# Patient Record
Sex: FEMALE | Race: BLACK OR AFRICAN AMERICAN | NOT HISPANIC OR LATINO | Employment: FULL TIME | URBAN - METROPOLITAN AREA
[De-identification: names, ages, dates, MRNs, and addresses within clinical notes are randomized per-mention and may not be internally consistent; named-entity substitution may affect disease eponyms.]

---

## 2023-03-13 PROBLEM — N63.0 BREAST LUMP: Status: ACTIVE | Noted: 2023-03-13

## 2023-03-13 PROBLEM — E66.9 OBESITY, CLASS II, BMI 35-39.9: Status: ACTIVE | Noted: 2023-03-13

## 2023-03-13 PROBLEM — J30.2 SEASONAL ALLERGIES: Status: ACTIVE | Noted: 2023-03-13

## 2023-03-13 PROBLEM — L30.9 ECZEMA: Status: ACTIVE | Noted: 2023-03-13

## 2023-03-13 PROBLEM — E66.812 OBESITY, CLASS II, BMI 35-39.9: Status: ACTIVE | Noted: 2023-03-13

## 2023-03-13 PROBLEM — H52.203 ASTIGMATISM, BILATERAL: Status: ACTIVE | Noted: 2023-03-13

## 2023-03-13 PROBLEM — R87.612 LOW GRADE SQUAMOUS INTRAEPITHELIAL LESION (LGSIL) ON CERVICAL PAP SMEAR: Status: ACTIVE | Noted: 2023-03-13

## 2023-03-13 RX ORDER — IBUPROFEN 200 MG
1 TABLET ORAL
COMMUNITY
Start: 2020-06-22 | End: 2023-10-26

## 2023-03-13 RX ORDER — NICOTINE 7MG/24HR
1 PATCH, TRANSDERMAL 24 HOURS TRANSDERMAL DAILY PRN
COMMUNITY
Start: 2020-06-22 | End: 2023-10-26

## 2023-03-24 ENCOUNTER — APPOINTMENT (OUTPATIENT)
Dept: PRIMARY CARE | Facility: CLINIC | Age: 33
End: 2023-03-24
Payer: COMMERCIAL

## 2023-04-18 ENCOUNTER — APPOINTMENT (OUTPATIENT)
Dept: LAB | Facility: LAB | Age: 33
End: 2023-04-18
Payer: COMMERCIAL

## 2023-04-18 LAB
ABO GROUP (TYPE) IN BLOOD: NORMAL
ALANINE AMINOTRANSFERASE (SGPT) (U/L) IN SER/PLAS: 16 U/L (ref 7–45)
ALBUMIN (G/DL) IN SER/PLAS: 4.1 G/DL (ref 3.4–5)
ALKALINE PHOSPHATASE (U/L) IN SER/PLAS: 66 U/L (ref 33–110)
ANION GAP IN SER/PLAS: 8 MMOL/L (ref 10–20)
ANTIBODY SCREEN: NORMAL
ASPARTATE AMINOTRANSFERASE (SGOT) (U/L) IN SER/PLAS: 14 U/L (ref 9–39)
BILIRUBIN TOTAL (MG/DL) IN SER/PLAS: 0.4 MG/DL (ref 0–1.2)
CALCIUM (MG/DL) IN SER/PLAS: 9.6 MG/DL (ref 8.6–10.6)
CARBON DIOXIDE, TOTAL (MMOL/L) IN SER/PLAS: 26 MMOL/L (ref 21–32)
CHLORIDE (MMOL/L) IN SER/PLAS: 104 MMOL/L (ref 98–107)
CREATININE (MG/DL) IN SER/PLAS: 0.64 MG/DL (ref 0.5–1.05)
CREATININE (MG/DL) IN URINE: 98.2 MG/DL (ref 20–320)
ERYTHROCYTE DISTRIBUTION WIDTH (RATIO) BY AUTOMATED COUNT: 12.5 % (ref 11.5–14.5)
ERYTHROCYTE MEAN CORPUSCULAR HEMOGLOBIN CONCENTRATION (G/DL) BY AUTOMATED: 33.2 G/DL (ref 32–36)
ERYTHROCYTE MEAN CORPUSCULAR VOLUME (FL) BY AUTOMATED COUNT: 85 FL (ref 80–100)
ERYTHROCYTES (10*6/UL) IN BLOOD BY AUTOMATED COUNT: 4.49 X10E12/L (ref 4–5.2)
ESTIMATED AVERAGE GLUCOSE FOR HBA1C: 111 MG/DL
GFR FEMALE: >90 ML/MIN/1.73M2
GLUCOSE (MG/DL) IN SER/PLAS: 87 MG/DL (ref 74–99)
HEMATOCRIT (%) IN BLOOD BY AUTOMATED COUNT: 38.3 % (ref 36–46)
HEMOGLOBIN (G/DL) IN BLOOD: 12.7 G/DL (ref 12–16)
HEMOGLOBIN A1C/HEMOGLOBIN TOTAL IN BLOOD: 5.5 %
HEPATITIS B VIRUS SURFACE AG PRESENCE IN SERUM: NONREACTIVE
HEPATITIS C VIRUS AB PRESENCE IN SERUM: NONREACTIVE
HIV 1/ 2 AG/AB SCREEN: NONREACTIVE
LACTATE DEHYDROGENASE (U/L) IN SER/PLAS BY LAC->PYR RXN: 116 U/L (ref 84–246)
LEUKOCYTES (10*3/UL) IN BLOOD BY AUTOMATED COUNT: 10.9 X10E9/L (ref 4.4–11.3)
NRBC (PER 100 WBCS) BY AUTOMATED COUNT: 0 /100 WBC (ref 0–0)
PLATELETS (10*3/UL) IN BLOOD AUTOMATED COUNT: 184 X10E9/L (ref 150–450)
POTASSIUM (MMOL/L) IN SER/PLAS: 4 MMOL/L (ref 3.5–5.3)
PROTEIN (MG/DL) IN URINE: 9 MG/DL (ref 5–24)
PROTEIN TOTAL: 6.9 G/DL (ref 6.4–8.2)
PROTEIN/CREATININE (MG/MG) IN URINE: 0.09 MG/MG CREAT (ref 0–0.17)
REFLEX ADDED, ANEMIA PANEL: NORMAL
RH FACTOR: NORMAL
RUBELLA VIRUS IGG AB: POSITIVE
SODIUM (MMOL/L) IN SER/PLAS: 134 MMOL/L (ref 136–145)
SYPHILIS TOTAL AB: NONREACTIVE
URATE (MG/DL) IN SER/PLAS: 3.3 MG/DL (ref 2.3–6.7)
UREA NITROGEN (MG/DL) IN SER/PLAS: 9 MG/DL (ref 6–23)

## 2023-04-19 LAB
CHLAMYDIA TRACH., AMPLIFIED: NEGATIVE
N. GONORRHEA, AMPLIFIED: NEGATIVE
TRICHOMONAS VAGINALIS: NEGATIVE
URINE CULTURE: NO GROWTH

## 2023-04-20 LAB
HEMOGLOBIN A2: 2.7 %
HEMOGLOBIN A: 97 %
HEMOGLOBIN F: 0.3 %
HEMOGLOBIN IDENTIFICATION INTERPRETATION: NORMAL
PATH REVIEW-HGB IDENTIFICATION: NORMAL

## 2023-05-16 ENCOUNTER — APPOINTMENT (OUTPATIENT)
Dept: LAB | Facility: LAB | Age: 33
End: 2023-05-16
Payer: COMMERCIAL

## 2023-08-01 ASSESSMENT — EDINBURGH POSTNATAL DEPRESSION SCALE (EPDS)
I HAVE LOOKED FORWARD WITH ENJOYMENT TO THINGS: AS MUCH AS I EVER DID
I HAVE BEEN ANXIOUS OR WORRIED FOR NO GOOD REASON: NO, NOT AT ALL
I HAVE BEEN SO UNHAPPY THAT I HAVE BEEN CRYING: NO, NEVER
THINGS HAVE BEEN GETTING ON TOP OF ME: NO, I HAVE BEEN COPING AS WELL AS EVER
TOTAL SCORE: 0
THE THOUGHT OF HARMING MYSELF HAS OCCURRED TO ME: NEVER
I HAVE BEEN ABLE TO LAUGH AND SEE THE FUNNY SIDE OF THINGS: AS MUCH AS I ALWAYS COULD
I HAVE BEEN SO UNHAPPY THAT I HAVE HAD DIFFICULTY SLEEPING: NOT AT ALL
I HAVE FELT SCARED OR PANICKY FOR NO GOOD REASON: NO, NOT AT ALL
I HAVE FELT SAD OR MISERABLE: NO, NOT AT ALL
I HAVE BLAMED MYSELF UNNECESSARILY WHEN THINGS WENT WRONG: NO, NEVER

## 2023-09-07 LAB
ERYTHROCYTE DISTRIBUTION WIDTH (RATIO) BY AUTOMATED COUNT: 13.2 % (ref 11.5–14.5)
ERYTHROCYTE MEAN CORPUSCULAR HEMOGLOBIN CONCENTRATION (G/DL) BY AUTOMATED: 32.4 G/DL (ref 32–36)
ERYTHROCYTE MEAN CORPUSCULAR VOLUME (FL) BY AUTOMATED COUNT: 84 FL (ref 80–100)
ERYTHROCYTES (10*6/UL) IN BLOOD BY AUTOMATED COUNT: 3.98 X10E12/L (ref 4–5.2)
FERRITIN, PREGNANCY: 42 UG/L
FOLATE, SERUM, PREGNANCY: 14.3 NG/ML
GLUCOSE, 1 HR SCREEN, PREG: 148 MG/DL
HEMATOCRIT (%) IN BLOOD BY AUTOMATED COUNT: 33.6 % (ref 36–46)
HEMOGLOBIN (G/DL) IN BLOOD: 10.9 G/DL (ref 12–16)
IRON (UG/DL) IN SER/PLAS IN PREGNANCY: 26 UG/DL
IRON BINDING CAPACITY (UG/DL) IN PREGNANCY: 440 UG/DL
IRON SATURATION (%) IN PREGNANCY: 6 %
LEUKOCYTES (10*3/UL) IN BLOOD BY AUTOMATED COUNT: 11.2 X10E9/L (ref 4.4–11.3)
NRBC (PER 100 WBCS) BY AUTOMATED COUNT: 0 /100 WBC (ref 0–0)
PLATELETS (10*3/UL) IN BLOOD AUTOMATED COUNT: 177 X10E9/L (ref 150–450)
REFLEX ADDED, ANEMIA PANEL: ABNORMAL
SYPHILIS TOTAL AB: NONREACTIVE
VITAMIN B12, PREGNANCY: 379 PG/ML

## 2023-09-28 LAB
GLUCOSE THREE HOUR: 84 MG/DL
GLUCOSE TWO HOUR: 163 MG/DL
GLUCOSE, FASTING: 80 MG/DL
GLUCOSE, ONE HOUR: 173 MG/DL
GTTCM: ABNORMAL

## 2023-09-30 PROBLEM — R87.610 ASCUS WITH POSITIVE HIGH RISK HPV CERVICAL: Status: ACTIVE | Noted: 2023-09-30

## 2023-09-30 PROBLEM — R87.810 ASCUS WITH POSITIVE HIGH RISK HPV CERVICAL: Status: ACTIVE | Noted: 2023-09-30

## 2023-10-01 ENCOUNTER — PHARMACY VISIT (OUTPATIENT)
Dept: PHARMACY | Facility: CLINIC | Age: 33
End: 2023-10-01
Payer: MEDICAID

## 2023-10-01 PROCEDURE — RXMED WILLOW AMBULATORY MEDICATION CHARGE

## 2023-10-02 ENCOUNTER — PROCEDURE VISIT (OUTPATIENT)
Dept: OBSTETRICS AND GYNECOLOGY | Facility: HOSPITAL | Age: 33
End: 2023-10-02
Payer: COMMERCIAL

## 2023-10-02 VITALS — SYSTOLIC BLOOD PRESSURE: 121 MMHG | BODY MASS INDEX: 37.77 KG/M2 | WEIGHT: 234 LBS | DIASTOLIC BLOOD PRESSURE: 77 MMHG

## 2023-10-02 DIAGNOSIS — R87.810 ASCUS WITH POSITIVE HIGH RISK HPV CERVICAL: Primary | ICD-10-CM

## 2023-10-02 DIAGNOSIS — R87.610 ASCUS WITH POSITIVE HIGH RISK HPV CERVICAL: Primary | ICD-10-CM

## 2023-10-02 PROCEDURE — 57452 EXAM OF CERVIX W/SCOPE: CPT | Performed by: OBSTETRICS & GYNECOLOGY

## 2023-10-02 NOTE — PROGRESS NOTES
Patient ID: Rita Mckeon is a 32 y.o. female.    Colposcopy    Date/Time: 10/2/2023 12:56 PM    Performed by: Savanna Rowley MD  Authorized by: Savanna Rowley MD    Procedure location: cervix    Consent:     Patient questions answered: yes      Risks and benefits of the procedure and its alternatives discussed: yes      Procedural risks discussed:  Bleeding and infection    Consent obtained:  Written    Consent given by:  Patient  Universal Protocol:     A time out verifies correct patient, procedure, equipment, support staff, and site/side marked as required: yes      Patient states understanding of procedure being performed: yes    Indication:     Cervical indication(s): high-risk HPV positive and ASCUS    Pre-procedure:     Speculum was placed in the vagina: yes    Procedure:     Colposcopy with: colposcopy only      Biopsy taken: no      Cervix visibility: fully visualized      SCJ visibility: fully visualized      Lesion visualized: not fully visualized      Acetowhite lesion(s): cervix      Acetowhite lesion(s) description:  AW at 12, 3    Cervical impression: low grade    Post-procedure:     Patient tolerance of procedure:  Patient tolerated the procedure well with no immediate complications    Instructions and paperwork completed: yes    Comments:      Procedure details:  The patient is . Rita is 32 weeks pregnant. The patient has not had a hysterectomy. The patient is not using contraception. The patient does not use tobacco. HIV negative.  A Pap smear was not performed.    Impression:  Low grade: thin/translucent acetowhite, rapidly fading acetowhite, and fine punctuation    Plan for Follow-up:    Follow-up Pap postpartum        Physical Exam  Genitourinary:           Comments: AW white changes

## 2023-10-12 ENCOUNTER — ROUTINE PRENATAL (OUTPATIENT)
Dept: OBSTETRICS AND GYNECOLOGY | Facility: CLINIC | Age: 33
End: 2023-10-12
Payer: COMMERCIAL

## 2023-10-12 VITALS — SYSTOLIC BLOOD PRESSURE: 113 MMHG | BODY MASS INDEX: 38.58 KG/M2 | WEIGHT: 239 LBS | DIASTOLIC BLOOD PRESSURE: 72 MMHG

## 2023-10-12 DIAGNOSIS — Z34.83 PRENATAL CARE, SUBSEQUENT PREGNANCY IN THIRD TRIMESTER (HHS-HCC): ICD-10-CM

## 2023-10-12 DIAGNOSIS — Z3A.34 34 WEEKS GESTATION OF PREGNANCY (HHS-HCC): ICD-10-CM

## 2023-10-12 DIAGNOSIS — O10.919 CHRONIC HYPERTENSION AFFECTING PREGNANCY (HHS-HCC): Primary | ICD-10-CM

## 2023-10-12 DIAGNOSIS — E66.9 OBESITY, CLASS II, BMI 35-39.9: ICD-10-CM

## 2023-10-12 DIAGNOSIS — O99.810 ABNORMAL GLUCOSE TOLERANCE IN PREGNANCY (HHS-HCC): ICD-10-CM

## 2023-10-12 PROBLEM — J30.2 SEASONAL ALLERGIES: Status: RESOLVED | Noted: 2023-03-13 | Resolved: 2023-10-12

## 2023-10-12 PROBLEM — R87.810 ASCUS WITH POSITIVE HIGH RISK HPV CERVICAL: Status: ACTIVE | Noted: 2023-03-13

## 2023-10-12 PROBLEM — R87.610 ASCUS WITH POSITIVE HIGH RISK HPV CERVICAL: Status: ACTIVE | Noted: 2023-03-13

## 2023-10-12 PROCEDURE — 99214 OFFICE O/P EST MOD 30 MIN: CPT | Performed by: ADVANCED PRACTICE MIDWIFE

## 2023-10-12 NOTE — PROGRESS NOTES
Assessment/Plan   Problem List Items Addressed This Visit             ICD-10-CM    Obesity, Class II, BMI 35-39.9 E66.9    Abnormal glucose tolerance in pregnancy O99.810    Chronic hypertension affecting pregnancy - Primary O10.919    Prenatal care, subsequent pregnancy in third trimester Z34.83    34 weeks gestation of pregnancy Z3A.34     Declines flu shot   Follow up growth US scheduled   Discussed routine GBS screening, to be completed next visit  Reviewed s/sx of PTL, warning signs, fetal movement counts, and when to call provider  Follow up in 2 weeks for a routine prenatal visit.    MEE Cheek, APRN-CNP    Subjective     Rita Mckeon is a 32 y.o.  at 34w6d with a working estimated date of delivery of 2023, by Last Menstrual Period who presents for a routine prenatal visit. She denies vaginal bleeding, leakage of fluid, decreased fetal movements, or contractions.    Her pregnancy is complicated by:  Pregnancy Problems (from 23 to present)       Problem Noted Resolved    Abnormal glucose tolerance in pregnancy 10/12/2023 by MEE Cheek, APRN-CNP No    Priority:  Medium      Overview Signed 10/12/2023  8:58 PM by MEE Cheek APRN-CNP     Normal 3 hr gtt         Chronic hypertension affecting pregnancy 10/12/2023 by MEE Cheek, APRN-CNP No    Priority:  Medium      Overview Signed 10/12/2023  8:59 PM by MEE Cheek, APRN-CNP     No meds         Prenatal care, subsequent pregnancy in third trimester 10/12/2023 by MEE Cheek, APRN-CNP No    Priority:  Medium      34 weeks gestation of pregnancy 10/12/2023 by MEE Cheek, APRN-CNP No    Priority:  Medium               Objective   Physical Exam:   Weight: 108 kg (239 lb)  Pregravid BMI: 35.53  BP: 113/72  Fetal Heart Rate: 136 Fundal Height (cm): 39 cm Presentation: Vertex             Prenatal Labs  Lab Results   Component Value Date    HGB 10.9 (L) 2023     HCT 33.6 (L) 09/07/2023    HEPBSAG NONREACTIVE 04/18/2023

## 2023-10-14 PROBLEM — H52.203 ASTIGMATISM, BILATERAL: Status: RESOLVED | Noted: 2023-03-13 | Resolved: 2023-10-14

## 2023-10-14 PROBLEM — Z3A.34 34 WEEKS GESTATION OF PREGNANCY (HHS-HCC): Status: RESOLVED | Noted: 2023-10-12 | Resolved: 2023-10-14

## 2023-10-20 ENCOUNTER — APPOINTMENT (OUTPATIENT)
Dept: OBSTETRICS AND GYNECOLOGY | Facility: HOSPITAL | Age: 33
End: 2023-10-20
Payer: COMMERCIAL

## 2023-10-23 ENCOUNTER — HOSPITAL ENCOUNTER (OUTPATIENT)
Dept: RADIOLOGY | Facility: HOSPITAL | Age: 33
Discharge: HOME | End: 2023-10-23
Payer: COMMERCIAL

## 2023-10-23 DIAGNOSIS — O16.9 HYPERTENSION AFFECTING PREGNANCY (HHS-HCC): ICD-10-CM

## 2023-10-23 PROCEDURE — 76816 OB US FOLLOW-UP PER FETUS: CPT

## 2023-10-23 PROCEDURE — 76819 FETAL BIOPHYS PROFIL W/O NST: CPT | Performed by: OBSTETRICS & GYNECOLOGY

## 2023-10-23 PROCEDURE — 76816 OB US FOLLOW-UP PER FETUS: CPT | Performed by: OBSTETRICS & GYNECOLOGY

## 2023-10-23 PROCEDURE — 76819 FETAL BIOPHYS PROFIL W/O NST: CPT

## 2023-10-26 ENCOUNTER — ROUTINE PRENATAL (OUTPATIENT)
Dept: OBSTETRICS AND GYNECOLOGY | Facility: CLINIC | Age: 33
End: 2023-10-26
Payer: COMMERCIAL

## 2023-10-26 ENCOUNTER — PHARMACY VISIT (OUTPATIENT)
Dept: PHARMACY | Facility: CLINIC | Age: 33
End: 2023-10-26
Payer: MEDICAID

## 2023-10-26 VITALS — WEIGHT: 242.2 LBS | SYSTOLIC BLOOD PRESSURE: 110 MMHG | BODY MASS INDEX: 39.09 KG/M2 | DIASTOLIC BLOOD PRESSURE: 76 MMHG

## 2023-10-26 DIAGNOSIS — E66.9 OBESITY, CLASS II, BMI 35-39.9: Primary | ICD-10-CM

## 2023-10-26 DIAGNOSIS — O10.919 CHRONIC HYPERTENSION AFFECTING PREGNANCY (HHS-HCC): ICD-10-CM

## 2023-10-26 DIAGNOSIS — Z3A.36 36 WEEKS GESTATION OF PREGNANCY (HHS-HCC): ICD-10-CM

## 2023-10-26 DIAGNOSIS — Z34.83 PRENATAL CARE, SUBSEQUENT PREGNANCY IN THIRD TRIMESTER (HHS-HCC): ICD-10-CM

## 2023-10-26 PROCEDURE — 87081 CULTURE SCREEN ONLY: CPT | Performed by: ADVANCED PRACTICE MIDWIFE

## 2023-10-26 PROCEDURE — 99214 OFFICE O/P EST MOD 30 MIN: CPT | Mod: TH | Performed by: ADVANCED PRACTICE MIDWIFE

## 2023-10-26 PROCEDURE — 99214 OFFICE O/P EST MOD 30 MIN: CPT | Performed by: ADVANCED PRACTICE MIDWIFE

## 2023-10-26 NOTE — PROGRESS NOTES
Assessment/Plan   Diagnoses and all orders for this visit:  Obesity, Class II, BMI 35-39.9  -     Fetal nonstress test; Future  Prenatal care, subsequent pregnancy in third trimester  36 weeks gestation of pregnancy  -     Group B Streptococcus (GBS) Prenatal Screen, Culture; Future  Chronic hypertension affecting pregnancy    Discussed routine GBS screening, to be completed today  Discussed weekly NSTs 37 weeks - delivery   Normal growth US 10/23  Reviewed s/sx of PTL, warning signs, fetal movement counts, and when to call provider  Follow up in 1 week for a routine prenatal visit.    MEE Cheek, APRN-CNP    Paulette Mckeon is a 32 y.o.  at 36w6d with a working estimated date of delivery of 2023, by Last Menstrual Period who presents for a routine prenatal visit. She denies vaginal bleeding, leakage of fluid, decreased fetal movements, or contractions.    Her pregnancy is complicated by:  Pregnancy Problems (from 23 to present)       Problem Noted Resolved    36 weeks gestation of pregnancy 10/26/2023 by MEE Cheek, APRN-CNP No    Priority:  Medium      Abnormal glucose tolerance in pregnancy 10/12/2023 by MEE Cheek, APRN-CNP No    Priority:  Medium      Overview Addendum 10/14/2023  2:49 PM by MEE Becerra       Normal 3 hr gtt         Chronic hypertension affecting pregnancy 10/12/2023 by MEE Cheek, APRN-CNP No    Priority:  Medium      Overview Addendum 10/14/2023  2:47 PM by MEE Becerra       No meds         Prenatal care, subsequent pregnancy in third trimester 10/12/2023 by MEE Cheek, APRN-CNP No    Priority:  Medium      34 weeks gestation of pregnancy 10/12/2023 by MEE Cheek APRN-CNP 10/14/2023 by MEE Becerra    Priority:  Medium               Objective   Physical Exam:   Weight: 110 kg (242 lb 3.2 oz)  Pregravid BMI: 35.53  BP:  110/76 (HR 90)  Fetal Heart Rate: 138 Fundal Height (cm): 40 cm Presentation: Vertex             Prenatal Labs  Lab Results   Component Value Date    HGB 10.9 (L) 09/07/2023    HCT 33.6 (L) 09/07/2023    ABO O 04/18/2023    HEPBSAG NONREACTIVE 04/18/2023

## 2023-10-28 LAB — GP B STREP GENITAL QL CULT: NORMAL

## 2023-11-02 ENCOUNTER — CLINICAL SUPPORT (OUTPATIENT)
Dept: OBSTETRICS AND GYNECOLOGY | Facility: CLINIC | Age: 33
End: 2023-11-02
Payer: COMMERCIAL

## 2023-11-02 ENCOUNTER — TELEPHONE (OUTPATIENT)
Dept: OBSTETRICS AND GYNECOLOGY | Facility: CLINIC | Age: 33
End: 2023-11-02

## 2023-11-02 ENCOUNTER — ROUTINE PRENATAL (OUTPATIENT)
Dept: OBSTETRICS AND GYNECOLOGY | Facility: CLINIC | Age: 33
End: 2023-11-02
Payer: COMMERCIAL

## 2023-11-02 VITALS — BODY MASS INDEX: 39.22 KG/M2 | DIASTOLIC BLOOD PRESSURE: 75 MMHG | SYSTOLIC BLOOD PRESSURE: 119 MMHG | WEIGHT: 243 LBS

## 2023-11-02 DIAGNOSIS — O99.810 ABNORMAL GLUCOSE TOLERANCE IN PREGNANCY (HHS-HCC): ICD-10-CM

## 2023-11-02 DIAGNOSIS — Z34.83 ENCOUNTER FOR SUPERVISION OF OTHER NORMAL PREGNANCY IN THIRD TRIMESTER (HHS-HCC): Primary | ICD-10-CM

## 2023-11-02 DIAGNOSIS — Z3A.36 36 WEEKS GESTATION OF PREGNANCY (HHS-HCC): ICD-10-CM

## 2023-11-02 DIAGNOSIS — Z34.83 PRENATAL CARE, SUBSEQUENT PREGNANCY IN THIRD TRIMESTER (HHS-HCC): Primary | ICD-10-CM

## 2023-11-02 DIAGNOSIS — E66.9 OBESITY, CLASS II, BMI 35-39.9: ICD-10-CM

## 2023-11-02 DIAGNOSIS — O10.919 CHRONIC HYPERTENSION AFFECTING PREGNANCY (HHS-HCC): ICD-10-CM

## 2023-11-02 DIAGNOSIS — Z3A.37 37 WEEKS GESTATION OF PREGNANCY (HHS-HCC): ICD-10-CM

## 2023-11-02 DIAGNOSIS — Z34.83 PRENATAL CARE, SUBSEQUENT PREGNANCY IN THIRD TRIMESTER (HHS-HCC): ICD-10-CM

## 2023-11-02 PROCEDURE — 59025 FETAL NON-STRESS TEST: CPT | Performed by: ADVANCED PRACTICE MIDWIFE

## 2023-11-02 PROCEDURE — 99214 OFFICE O/P EST MOD 30 MIN: CPT | Mod: TH | Performed by: ADVANCED PRACTICE MIDWIFE

## 2023-11-02 PROCEDURE — 99214 OFFICE O/P EST MOD 30 MIN: CPT | Performed by: ADVANCED PRACTICE MIDWIFE

## 2023-11-02 NOTE — PROCEDURES
Rita Mckeon, ir[place]  at 37w6d with an MARI of 2023, by Last Menstrual Period, was seen at [facility] for a nonstress test.    Non-Stress Test   Baseline Fetal Heart Rate for Non-Stress Test: 130 BPM  Variability in Waveform for Non-Stress Test: Moderate  Accelerations in Non-Stress Test: Yes, lasting at least 15 seconds, greater than/equal to 15 bpm  Decelerations in Non-Stress Test: None  Contractions in Non-Stress Test: Not present  Acoustic Stimulator for Non-Stress Test: No  Interpretation of Non-Stress Test   Interpretation of Non-Stress Test: Reactive            Julisa Cui, APRN-CNM, APRN-CNP

## 2023-11-02 NOTE — PROGRESS NOTES
Subjective     Rita Mckeon is a 32 y.o.  at 37w6d with a working estimated date of delivery of 2023, by Last Menstrual Period who presents for a routine prenatal visit.     She denies vaginal bleeding, leakage of fluid, decreased fetal movements, or contractions.    Desires to schedule IOL @ 39 weeks. RN tasked to schedule.   Patient now leaning towards tubal ligation. Consent signed. Discussed that since she is < 30 days from MARI will have to do after 6 wk PP visit.     Objective   Physical Exam:    Expected Total Weight Gain: 5 kg (11 lb)-9 kg (19 lb)   Pregravid BMI: 35.53   Fetal Heart Rate: 130 Fundal Height (cm): 41 cm Presentation: Vertex           Postpartum Depression: Low Risk  (2023)    [place]  Depression Scale     Last EPDS Total Score: 0     Last EPDS Self Harm Result: Never        Problem List Items Addressed This Visit       Obesity, Class II, BMI 35-39.9    Overview       BMI 36 @ NOB visit  Growth US at 30 and 36 weeks   BPP or NST 37 wks to delivery          Abnormal glucose tolerance in pregnancy    Overview       Normal 3 hr gtt         Chronic hypertension affecting pregnancy    Overview       No meds         Prenatal care, subsequent pregnancy in third trimester - Primary    37 weeks gestation of pregnancy       Postpartum contraception options discussed, encouraged LARCs or bridge method until BTL. Patient to consider options.   Discussed expectations and methods used for IOL - RN to schedule   Reviewed neg GBS  Continue weekly  testing - BPP next week  Reviewed s/sx of labor, warning signs, fetal movement counts, and when to call provider  Follow up in 1 week for a routine prenatal visit.      Julisa Cui, NETTIE-CNM, APRN-CNP

## 2023-11-02 NOTE — TELEPHONE ENCOUNTER
IOL scheduled 11/11/23 at 2:00 pm 39w1d per DERIC Cui CNM. Pt notified. Visitor policy reviewed. Pt agrees No questions.

## 2023-11-09 ENCOUNTER — APPOINTMENT (OUTPATIENT)
Dept: OBSTETRICS AND GYNECOLOGY | Facility: CLINIC | Age: 33
End: 2023-11-09
Payer: COMMERCIAL

## 2023-11-09 ENCOUNTER — APPOINTMENT (OUTPATIENT)
Dept: RADIOLOGY | Facility: CLINIC | Age: 33
End: 2023-11-09
Payer: COMMERCIAL

## 2023-11-09 ENCOUNTER — ANCILLARY PROCEDURE (OUTPATIENT)
Dept: RADIOLOGY | Facility: CLINIC | Age: 33
End: 2023-11-09
Payer: COMMERCIAL

## 2023-11-09 ENCOUNTER — ROUTINE PRENATAL (OUTPATIENT)
Dept: OBSTETRICS AND GYNECOLOGY | Facility: CLINIC | Age: 33
End: 2023-11-09
Payer: COMMERCIAL

## 2023-11-09 VITALS — DIASTOLIC BLOOD PRESSURE: 84 MMHG | WEIGHT: 244.3 LBS | BODY MASS INDEX: 39.43 KG/M2 | SYSTOLIC BLOOD PRESSURE: 130 MMHG

## 2023-11-09 DIAGNOSIS — O10.919 CHRONIC HYPERTENSION AFFECTING PREGNANCY (HHS-HCC): ICD-10-CM

## 2023-11-09 DIAGNOSIS — O99.810 ABNORMAL GLUCOSE TOLERANCE IN PREGNANCY (HHS-HCC): ICD-10-CM

## 2023-11-09 DIAGNOSIS — Z3A.38 38 WEEKS GESTATION OF PREGNANCY (HHS-HCC): ICD-10-CM

## 2023-11-09 DIAGNOSIS — E66.9 OBESITY, CLASS II, BMI 35-39.9: Primary | ICD-10-CM

## 2023-11-09 DIAGNOSIS — Z34.83 PRENATAL CARE, SUBSEQUENT PREGNANCY IN THIRD TRIMESTER (HHS-HCC): ICD-10-CM

## 2023-11-09 DIAGNOSIS — O28.8 NST (NON-STRESS TEST) NONREACTIVE: ICD-10-CM

## 2023-11-09 PROCEDURE — 99214 OFFICE O/P EST MOD 30 MIN: CPT | Mod: TH | Performed by: ADVANCED PRACTICE MIDWIFE

## 2023-11-09 PROCEDURE — 99214 OFFICE O/P EST MOD 30 MIN: CPT | Performed by: ADVANCED PRACTICE MIDWIFE

## 2023-11-09 PROCEDURE — 76819 FETAL BIOPHYS PROFIL W/O NST: CPT

## 2023-11-09 NOTE — PROGRESS NOTES
Subjective     Rita Mckeon is a 32 y.o.  at 38w6d with a working estimated date of delivery of 2023, by Last Menstrual Period who presents for a routine prenatal visit.     She denies vaginal bleeding, leakage of fluid, decreased fetal movements, or contractions.    Objective   Physical Exam:   Weight: 111 kg (244 lb 4.8 oz)  Expected Total Weight Gain: 5 kg (11 lb)-9 kg (19 lb)   Pregravid BMI: 35.53  BP: 130/84 (HR 80)  Fetal Heart Rate: 140 Fundal Height (cm): 42 cm Presentation: Vertex           Postpartum Depression: Low Risk  (2023)    [place]  Depression Scale     Last EPDS Total Score: 0     Last EPDS Self Harm Result: Never        Problem List Items Addressed This Visit       Abnormal glucose tolerance in pregnancy    Overview       Normal 3 hr gtt         Chronic hypertension affecting pregnancy    Overview       No meds         Prenatal care, subsequent pregnancy in third trimester    37 weeks gestation of pregnancy     Ultrasound today    surveillance weekly for obesity class II  Discussed expectations and methods used for IOL  IOL scheduled   Reviewed etiology of shoulder dystocia  Occurrence in approximately 0.2-3% of vaginal deliveries  Discussed it is difficult to predict who may have a shoulder dystocia, however risk factors include:  diabetes in pregnancy (babies are more likely to have macrosomia; show a pattern of greater shoulder/chest/abdominal growth)  fetal macrosomia (3-13% of newborns weighing >4000g)  excessive weight gain in pregnancy (associated with increased rates of fetal macrosomia)  prior history of shoulder dystocia (10-12% recurrence rate)  Discussed maternal and  risks reviewed including: lacerations, PS separation,  brachial plexus injury, fractures to humerus or clavicle, HIE, rarely  death (1 in 25,000 deliveries)  Reviewed weight gain ; 1hr glucose (148) and WNL 23 hr gtt  Discussed  options/risks/benefits of pLTCS vs. vaginal delivery  Patient acknowledges understanding   Reviewed s/sx of labor, warning signs, fetal movement counts, and when to call provider  Follow up in 2 days for IOL or prn      Julisa Cui, APRN-CNM, APRN-CNP

## 2023-11-11 ENCOUNTER — APPOINTMENT (OUTPATIENT)
Dept: OBSTETRICS AND GYNECOLOGY | Facility: HOSPITAL | Age: 33
End: 2023-11-11
Payer: COMMERCIAL

## 2023-11-11 ENCOUNTER — HOSPITAL ENCOUNTER (INPATIENT)
Facility: HOSPITAL | Age: 33
LOS: 4 days | Discharge: HOME | End: 2023-11-15
Attending: STUDENT IN AN ORGANIZED HEALTH CARE EDUCATION/TRAINING PROGRAM | Admitting: STUDENT IN AN ORGANIZED HEALTH CARE EDUCATION/TRAINING PROGRAM
Payer: COMMERCIAL

## 2023-11-11 DIAGNOSIS — E66.9 OBESITY, CLASS II, BMI 35-39.9: Primary | ICD-10-CM

## 2023-11-11 DIAGNOSIS — R87.810 ASCUS WITH POSITIVE HIGH RISK HPV CERVICAL: ICD-10-CM

## 2023-11-11 DIAGNOSIS — O99.810 ABNORMAL GLUCOSE TOLERANCE IN PREGNANCY (HHS-HCC): ICD-10-CM

## 2023-11-11 DIAGNOSIS — O10.919 CHRONIC HYPERTENSION AFFECTING PREGNANCY (HHS-HCC): ICD-10-CM

## 2023-11-11 DIAGNOSIS — R87.610 ASCUS WITH POSITIVE HIGH RISK HPV CERVICAL: ICD-10-CM

## 2023-11-11 DIAGNOSIS — E66.9 OBESITY, CLASS II, BMI 35-39.9: ICD-10-CM

## 2023-11-11 DIAGNOSIS — Z34.83 ENCOUNTER FOR SUPERVISION OF OTHER NORMAL PREGNANCY IN THIRD TRIMESTER (HHS-HCC): ICD-10-CM

## 2023-11-11 PROBLEM — Z3A.39 39 WEEKS GESTATION OF PREGNANCY (HHS-HCC): Status: ACTIVE | Noted: 2023-11-11

## 2023-11-11 PROBLEM — Z3A.37 37 WEEKS GESTATION OF PREGNANCY (HHS-HCC): Status: RESOLVED | Noted: 2023-10-26 | Resolved: 2023-11-11

## 2023-11-11 LAB
ABO GROUP (TYPE) IN BLOOD: NORMAL
ANTIBODY SCREEN: NORMAL
ERYTHROCYTE [DISTWIDTH] IN BLOOD BY AUTOMATED COUNT: 14.2 % (ref 11.5–14.5)
HCT VFR BLD AUTO: 37.6 % (ref 36–46)
HGB BLD-MCNC: 12.1 G/DL (ref 12–16)
MCH RBC QN AUTO: 26.7 PG (ref 26–34)
MCHC RBC AUTO-ENTMCNC: 32.2 G/DL (ref 32–36)
MCV RBC AUTO: 83 FL (ref 80–100)
NRBC BLD-RTO: 0 /100 WBCS (ref 0–0)
PLATELET # BLD AUTO: 147 X10*3/UL (ref 150–450)
RBC # BLD AUTO: 4.53 X10*6/UL (ref 4–5.2)
RH FACTOR (ANTIGEN D): NORMAL
WBC # BLD AUTO: 9.4 X10*3/UL (ref 4.4–11.3)

## 2023-11-11 PROCEDURE — 86850 RBC ANTIBODY SCREEN: CPT | Performed by: DOULA

## 2023-11-11 PROCEDURE — 85027 COMPLETE CBC AUTOMATED: CPT | Performed by: DOULA

## 2023-11-11 PROCEDURE — 2720000007 HC OR 272 NO HCPCS

## 2023-11-11 PROCEDURE — 3E033VJ INTRODUCTION OF OTHER HORMONE INTO PERIPHERAL VEIN, PERCUTANEOUS APPROACH: ICD-10-PCS | Performed by: DOULA

## 2023-11-11 PROCEDURE — 36415 COLL VENOUS BLD VENIPUNCTURE: CPT | Performed by: DOULA

## 2023-11-11 PROCEDURE — 1120000001 HC OB PRIVATE ROOM DAILY

## 2023-11-11 PROCEDURE — 86780 TREPONEMA PALLIDUM: CPT | Performed by: DOULA

## 2023-11-11 PROCEDURE — 2500000004 HC RX 250 GENERAL PHARMACY W/ HCPCS (ALT 636 FOR OP/ED): Performed by: DOULA

## 2023-11-11 RX ORDER — NIFEDIPINE 10 MG/1
10 CAPSULE ORAL ONCE AS NEEDED
Status: DISCONTINUED | OUTPATIENT
Start: 2023-11-11 | End: 2023-11-12

## 2023-11-11 RX ORDER — METOCLOPRAMIDE 10 MG/1
10 TABLET ORAL EVERY 6 HOURS PRN
Status: DISCONTINUED | OUTPATIENT
Start: 2023-11-11 | End: 2023-11-12

## 2023-11-11 RX ORDER — SODIUM CHLORIDE, SODIUM LACTATE, POTASSIUM CHLORIDE, CALCIUM CHLORIDE 600; 310; 30; 20 MG/100ML; MG/100ML; MG/100ML; MG/100ML
125 INJECTION, SOLUTION INTRAVENOUS CONTINUOUS
Status: DISCONTINUED | OUTPATIENT
Start: 2023-11-11 | End: 2023-11-12

## 2023-11-11 RX ORDER — ONDANSETRON 4 MG/1
4 TABLET, FILM COATED ORAL EVERY 6 HOURS PRN
Status: DISCONTINUED | OUTPATIENT
Start: 2023-11-11 | End: 2023-11-12

## 2023-11-11 RX ORDER — OXYTOCIN/0.9 % SODIUM CHLORIDE 30/500 ML
2-30 PLASTIC BAG, INJECTION (ML) INTRAVENOUS CONTINUOUS
Status: DISCONTINUED | OUTPATIENT
Start: 2023-11-11 | End: 2023-11-12

## 2023-11-11 RX ORDER — METHYLERGONOVINE MALEATE 0.2 MG/ML
0.2 INJECTION INTRAVENOUS ONCE AS NEEDED
Status: DISCONTINUED | OUTPATIENT
Start: 2023-11-11 | End: 2023-11-12

## 2023-11-11 RX ORDER — MORPHINE SULFATE 2 MG/ML
2 INJECTION, SOLUTION INTRAMUSCULAR; INTRAVENOUS ONCE
Status: DISCONTINUED | OUTPATIENT
Start: 2023-11-11 | End: 2023-11-11

## 2023-11-11 RX ORDER — METOCLOPRAMIDE HYDROCHLORIDE 5 MG/ML
10 INJECTION INTRAMUSCULAR; INTRAVENOUS EVERY 6 HOURS PRN
Status: DISCONTINUED | OUTPATIENT
Start: 2023-11-11 | End: 2023-11-12

## 2023-11-11 RX ORDER — LOPERAMIDE HYDROCHLORIDE 2 MG/1
4 CAPSULE ORAL EVERY 2 HOUR PRN
Status: DISCONTINUED | OUTPATIENT
Start: 2023-11-11 | End: 2023-11-12

## 2023-11-11 RX ORDER — OXYTOCIN 10 [USP'U]/ML
10 INJECTION, SOLUTION INTRAMUSCULAR; INTRAVENOUS ONCE AS NEEDED
Status: DISCONTINUED | OUTPATIENT
Start: 2023-11-11 | End: 2023-11-12

## 2023-11-11 RX ORDER — CARBOPROST TROMETHAMINE 250 UG/ML
250 INJECTION, SOLUTION INTRAMUSCULAR ONCE AS NEEDED
Status: DISCONTINUED | OUTPATIENT
Start: 2023-11-11 | End: 2023-11-12

## 2023-11-11 RX ORDER — HYDRALAZINE HYDROCHLORIDE 20 MG/ML
5 INJECTION INTRAMUSCULAR; INTRAVENOUS ONCE AS NEEDED
Status: DISCONTINUED | OUTPATIENT
Start: 2023-11-11 | End: 2023-11-12

## 2023-11-11 RX ORDER — TERBUTALINE SULFATE 1 MG/ML
0.25 INJECTION SUBCUTANEOUS ONCE AS NEEDED
Status: DISCONTINUED | OUTPATIENT
Start: 2023-11-11 | End: 2023-11-12

## 2023-11-11 RX ORDER — LABETALOL HYDROCHLORIDE 5 MG/ML
20 INJECTION, SOLUTION INTRAVENOUS ONCE AS NEEDED
Status: DISCONTINUED | OUTPATIENT
Start: 2023-11-11 | End: 2023-11-12

## 2023-11-11 RX ORDER — OXYTOCIN/0.9 % SODIUM CHLORIDE 30/500 ML
60 PLASTIC BAG, INJECTION (ML) INTRAVENOUS ONCE AS NEEDED
Status: DISCONTINUED | OUTPATIENT
Start: 2023-11-11 | End: 2023-11-12

## 2023-11-11 RX ORDER — ONDANSETRON HYDROCHLORIDE 2 MG/ML
4 INJECTION, SOLUTION INTRAVENOUS EVERY 6 HOURS PRN
Status: DISCONTINUED | OUTPATIENT
Start: 2023-11-11 | End: 2023-11-12

## 2023-11-11 RX ORDER — MISOPROSTOL 200 UG/1
800 TABLET ORAL ONCE AS NEEDED
Status: DISCONTINUED | OUTPATIENT
Start: 2023-11-11 | End: 2023-11-12

## 2023-11-11 RX ORDER — TRANEXAMIC ACID 100 MG/ML
1000 INJECTION, SOLUTION INTRAVENOUS ONCE AS NEEDED
Status: DISCONTINUED | OUTPATIENT
Start: 2023-11-11 | End: 2023-11-12

## 2023-11-11 RX ORDER — LIDOCAINE HYDROCHLORIDE 10 MG/ML
30 INJECTION INFILTRATION; PERINEURAL ONCE AS NEEDED
Status: DISCONTINUED | OUTPATIENT
Start: 2023-11-11 | End: 2023-11-12

## 2023-11-11 RX ADMIN — OXYTOCIN-SODIUM CHLORIDE 0.9% IV SOLN 30 UNIT/500ML 2 MILLI-UNITS/MIN: 30-0.9/5 SOLUTION at 17:00

## 2023-11-11 RX ADMIN — SODIUM CHLORIDE, POTASSIUM CHLORIDE, SODIUM LACTATE AND CALCIUM CHLORIDE 500 ML: 600; 310; 30; 20 INJECTION, SOLUTION INTRAVENOUS at 15:15

## 2023-11-11 RX ADMIN — SODIUM CHLORIDE, POTASSIUM CHLORIDE, SODIUM LACTATE AND CALCIUM CHLORIDE 125 ML/HR: 600; 310; 30; 20 INJECTION, SOLUTION INTRAVENOUS at 15:00

## 2023-11-11 RX ADMIN — SODIUM CHLORIDE, POTASSIUM CHLORIDE, SODIUM LACTATE AND CALCIUM CHLORIDE 125 ML/HR: 600; 310; 30; 20 INJECTION, SOLUTION INTRAVENOUS at 18:38

## 2023-11-11 SDOH — SOCIAL STABILITY: SOCIAL INSECURITY: DO YOU FEEL ANYONE HAS EXPLOITED OR TAKEN ADVANTAGE OF YOU FINANCIALLY OR OF YOUR PERSONAL PROPERTY?: NO

## 2023-11-11 SDOH — SOCIAL STABILITY: SOCIAL INSECURITY: ARE YOU OR HAVE YOU BEEN THREATENED OR ABUSED PHYSICALLY, EMOTIONALLY, OR SEXUALLY BY ANYONE?: NO

## 2023-11-11 SDOH — HEALTH STABILITY: MENTAL HEALTH: WERE YOU ABLE TO COMPLETE ALL THE BEHAVIORAL HEALTH SCREENINGS?: YES

## 2023-11-11 SDOH — HEALTH STABILITY: MENTAL HEALTH: WISH TO BE DEAD (PAST 1 MONTH): NO

## 2023-11-11 SDOH — SOCIAL STABILITY: SOCIAL INSECURITY: HAS ANYONE EVER THREATENED TO HURT YOUR FAMILY OR YOUR PETS?: NO

## 2023-11-11 SDOH — SOCIAL STABILITY: SOCIAL INSECURITY: PHYSICAL ABUSE: DENIES

## 2023-11-11 SDOH — SOCIAL STABILITY: SOCIAL INSECURITY: ARE THERE ANY APPARENT SIGNS OF INJURIES/BEHAVIORS THAT COULD BE RELATED TO ABUSE/NEGLECT?: NO

## 2023-11-11 SDOH — SOCIAL STABILITY: SOCIAL INSECURITY: VERBAL ABUSE: DENIES

## 2023-11-11 SDOH — SOCIAL STABILITY: SOCIAL INSECURITY: DOES ANYONE TRY TO KEEP YOU FROM HAVING/CONTACTING OTHER FRIENDS OR DOING THINGS OUTSIDE YOUR HOME?: NO

## 2023-11-11 SDOH — SOCIAL STABILITY: SOCIAL INSECURITY: HAVE YOU HAD THOUGHTS OF HARMING ANYONE ELSE?: NO

## 2023-11-11 SDOH — SOCIAL STABILITY: SOCIAL INSECURITY: ABUSE SCREEN: ADULT

## 2023-11-11 SDOH — HEALTH STABILITY: MENTAL HEALTH: SUICIDAL BEHAVIOR (LIFETIME): NO

## 2023-11-11 SDOH — ECONOMIC STABILITY: HOUSING INSECURITY: DO YOU FEEL UNSAFE GOING BACK TO THE PLACE WHERE YOU ARE LIVING?: NO

## 2023-11-11 SDOH — HEALTH STABILITY: MENTAL HEALTH: NON-SPECIFIC ACTIVE SUICIDAL THOUGHTS (PAST 1 MONTH): NO

## 2023-11-11 ASSESSMENT — PAIN SCALES - GENERAL
PAINLEVEL_OUTOF10: 0 - NO PAIN
PAINLEVEL_OUTOF10: 1
PAINLEVEL_OUTOF10: 0 - NO PAIN
PAINLEVEL_OUTOF10: 0 - NO PAIN
PAINLEVEL_OUTOF10: 1
PAINLEVEL_OUTOF10: 0 - NO PAIN

## 2023-11-11 ASSESSMENT — PATIENT HEALTH QUESTIONNAIRE - PHQ9
2. FEELING DOWN, DEPRESSED OR HOPELESS: NOT AT ALL
1. LITTLE INTEREST OR PLEASURE IN DOING THINGS: NOT AT ALL
SUM OF ALL RESPONSES TO PHQ9 QUESTIONS 1 & 2: 0

## 2023-11-11 ASSESSMENT — LIFESTYLE VARIABLES
HOW OFTEN DO YOU HAVE A DRINK CONTAINING ALCOHOL: NEVER
HOW MANY STANDARD DRINKS CONTAINING ALCOHOL DO YOU HAVE ON A TYPICAL DAY: PATIENT DOES NOT DRINK
SKIP TO QUESTIONS 9-10: 1
AUDIT-C TOTAL SCORE: 0
HOW OFTEN DO YOU HAVE 6 OR MORE DRINKS ON ONE OCCASION: NEVER
AUDIT-C TOTAL SCORE: 0

## 2023-11-11 NOTE — H&P
Obstetrical Admission History and Physical    Assessment/Plan  Rita Mckeon is a 32 y.o.  at 39w1d who presents for Term IOL.    #Term IOL   - admit to L&D per protocal  -T&S, CBC  -consent obtained  -BSUS to confirm cephalic presentation  -pain management options discussed  -continue assement of maternal and fetal wellbeing  -will evaluate progress of labor as clinically indicated  -Discussed with pt risks of shoulder dystocia, given EFW by leopold's. The patient was counseled extensively on the risks of a shoulder dystocia, including the maternal risks of perineal lacerations, pubic symphysis dislocations, episiotomy and the fetal risks of brachial plexus injuries, clavicular/humeral fractures, HIE, and death. We discussed the different maneuvers that we would use and the indications for a CD including NRFHTS, arrest of dilation or arrest of descent. The patient understands the risks and  desires to proceed with the IOL. Pt endorses understanding. Pt had 2 previous SVB, believes that this baby is a similar size to her last.   -Dr. Mantilla aware of patient admission and status      #cHTN no meds   - Will monitor VS closely   -Pt denies PEC symptomology     #Favorable Cervix   -pt prefers to do pit only     #GBS neg   -no need for GBS prophylaxis     #Hx of ASCUS with positive high risk HPV cervical  -s/p colpo on 10/02  -for pap PP    # Postpartum Disposition  - Breastfeeding Planned  - Contraception Plans: Tubal Sterilization at 6 weeks PP. Consent forms were signed <30 days ago     Subjective   Rita Mckeon is a 32 y.o.  at 39w1d admitted for term IOL. Endorses good FM. Denies VB and LOF   Due Date: 2023, by Last Menstrual Period  Prenatal Care with Julisa Cui CNM     Pregnancy notables:  Medical Problems       Problem List               Obesity, Class II, BMI 35-39.9    Overview Signed 10/14/2023  2:50 PM by MEE Becerra       BMI 36 @ NOB visit  Growth  US at 30 and 36 weeks   BPP or NST 37 wks to delivery   36 wga US EFW 62%tile AC 93%tile         Hx of ASCUS with positive high risk HPV cervical    Overview Addendum 10/14/2023  2:49 PM by MEE Becerra        Pap smear of cervix with ASCUS, cannot exclude HGSIL  OB COLPO 10/2 w/ Dr. Rowley;   needs pap PP         Abnormal glucose tolerance in pregnancy    Overview Addendum 10/14/2023  2:49 PM by MEE Becerra     Abnormal 1 hour   Normal 3 hr gtt         Chronic hypertension affecting pregnancy    Overview Addendum 10/14/2023  2:47 PM by MEE Becerra       No meds                   Obstetrical History   OB History    Para Term  AB Living   4 2 2 0 1 2   SAB IAB Ectopic Multiple Live Births   0 1 0 0 2      # Outcome Date GA Lbr Jared/2nd Weight Sex Delivery Anes PTL Lv   4 Current            3 Term 16 40w0d  3657 g F Vag-Spont  N    2 Term 11 40w0d  3657 g M Vag-Spont EPI N    1 IAB                Past Medical History  Past Medical History:   Diagnosis Date    Abnormal Pap smear of cervix     Eczema     Hypertension     Obesity in pregnancy        Past Surgical History   No past surgical history on file.    Social History  Denies abuse  Denies T/E/D    Allergies  Patient has no known allergies.    Medications    Current Facility-Administered Medications:     carboprost (Hemabate) injection 250 mcg, 250 mcg, intramuscular, Once PRN, She Pradhan, APRN-CNM    hydrALAZINE (Apresoline) injection 5 mg, 5 mg, intravenous, Once PRN, She B Pradhan, APRN-CNM    labetaloL (Normodyne,Trandate) injection 20 mg, 20 mg, intravenous, Once PRN, She Walkerper, APRN-CNM    lactated Ringer's bolus 500 mL, 500 mL, intravenous, Once PRN, She B Pradhan, APRN-CNM    lactated Ringer's infusion, 125 mL/hr, intravenous, Continuous, She Pradhan, APRN-CNM, Last Rate: 125 mL/hr at 23 1500, 125 mL/hr at 23 1500     levonorgestreL (LILETTA) IUD 52 mg, 1 each, intrauterine, Once PRN, She Pradhan, APRN-CNM    lidocaine (Xylocaine) 10 mg/mL (1 %) injection 30 mL, 30 mL, subcutaneous, Once PRN, She Pradhan, APRN-CNM    loperamide (Imodium) capsule 4 mg, 4 mg, oral, q2h PRN, She Pradhan, APRN-CNM    methylergonovine (Methergine) injection 0.2 mg, 0.2 mg, intramuscular, Once PRN, She Pradhan, APRN-CNM    metoclopramide (Reglan) tablet 10 mg, 10 mg, oral, q6h PRN **OR** metoclopramide (Reglan) injection 10 mg, 10 mg, intravenous, q6h PRN, She Pradhan, APRN-CNM    miSOPROStoL (Cytotec) tablet 800 mcg, 800 mcg, rectal, Once PRN, She Pradhan, APRN-CNM    NIFEdipine (Procardia) capsule 10 mg, 10 mg, oral, Once PRN, She Pradhan, APRN-CNM    ondansetron (Zofran) tablet 4 mg, 4 mg, oral, q6h PRN **OR** ondansetron (Zofran) injection 4 mg, 4 mg, intravenous, q6h PRN, She Pradhan, APRN-CNM    oxytocin (Pitocin) bolus from bag, 600 homero-units/min, intravenous, Once PRN, She Pradhan, APRN-CNM    oxytocin (Pitocin) bolus from bag, 600 homero-units/min, intravenous, Once PRN, She Pradhan, APRN-CNM    oxytocin (Pitocin) infusion in sodium chloride 0.9% 30 units/500 mL, 60 homero-units/min, intravenous, Once PRN, She Pradhan, APRN-CNM    oxytocin (Pitocin) injection 10 Units, 10 Units, intramuscular, Once PRN, She Pradhan, APRN-CNM    oxytocin (Pitocin) injection 10 Units, 10 Units, intramuscular, Once PRN, She Pradhan, APRN-CNM    terbutaline (Brethine) injection 0.25 mg, 0.25 mg, subcutaneous, Once PRN, NETTIE Butler-SEDA    tranexamic acid (Cyklokapron) injection 1,000 mg, 1,000 mg, intravenous, Once PRN, MEE Butler    Objective   BMI Temp Pulse Resp BP MAP O2 Sat   Body mass index is 41.28 kg/m². 36.9 °C (98.4 °F) 91 18 118/64   99 %     Physical Examination   General:   Alert and oriented, in no acute distress   Neck: Supple. No visible thyromegaly.    Abdomen: Soft,  non-tender, gravid   : Normal genitourinary exam without lesions, masses, or abnormal discharge   Psych Normal affect. Normal mood.      OB Exam  Fetal Movement: good  FHT: 160, mod varI, + accel, - decels  Contractions: irregular   Cervical Dilation: 2/50/-3/mid/soft   Membranes: Intact    Estimated Fetal Weight: 9lbs by Leopold's     Fetal Presentation: Cephalic by Ultrasound    Prenatal Labs:  All available labs were reviewed. Additional labs were ordered as indicated below.    Lab Results   Component Value Date    WBC 11.2 09/07/2023    HGB 10.9 (L) 09/07/2023    HCT 33.6 (L) 09/07/2023    MCV 84 09/07/2023     09/07/2023       GBS Prophylaxis Indicated? No      MEE Butler

## 2023-11-12 ENCOUNTER — ANESTHESIA EVENT (OUTPATIENT)
Dept: OBSTETRICS AND GYNECOLOGY | Facility: HOSPITAL | Age: 33
End: 2023-11-12
Payer: COMMERCIAL

## 2023-11-12 ENCOUNTER — ANESTHESIA (OUTPATIENT)
Dept: OBSTETRICS AND GYNECOLOGY | Facility: HOSPITAL | Age: 33
End: 2023-11-12
Payer: COMMERCIAL

## 2023-11-12 LAB
ABO GROUP (TYPE) IN BLOOD: NORMAL
ANTIBODY SCREEN: NORMAL
APTT PPP: 36 SECONDS (ref 27–38)
ERYTHROCYTE [DISTWIDTH] IN BLOOD BY AUTOMATED COUNT: 14 % (ref 11.5–14.5)
FIBRINOGEN PPP-MCNC: 280 MG/DL (ref 200–400)
HCT VFR BLD AUTO: 25.5 % (ref 36–46)
HGB BLD-MCNC: 8.8 G/DL (ref 12–16)
INR PPP: 1.1 (ref 0.9–1.1)
MCH RBC QN AUTO: 27.6 PG (ref 26–34)
MCHC RBC AUTO-ENTMCNC: 34.5 G/DL (ref 32–36)
MCV RBC AUTO: 80 FL (ref 80–100)
NRBC BLD-RTO: 0 /100 WBCS (ref 0–0)
PLATELET # BLD AUTO: 123 X10*3/UL (ref 150–450)
PROTHROMBIN TIME: 12.6 SECONDS (ref 9.8–12.8)
RBC # BLD AUTO: 3.19 X10*6/UL (ref 4–5.2)
RH FACTOR (ANTIGEN D): NORMAL
T PALLIDUM AB SER QL: NONREACTIVE
WBC # BLD AUTO: 16 X10*3/UL (ref 4.4–11.3)

## 2023-11-12 PROCEDURE — 86901 BLOOD TYPING SEROLOGIC RH(D): CPT

## 2023-11-12 PROCEDURE — 1210000001 HC SEMI-PRIVATE ROOM DAILY

## 2023-11-12 PROCEDURE — 85027 COMPLETE CBC AUTOMATED: CPT

## 2023-11-12 PROCEDURE — 10907ZC DRAINAGE OF AMNIOTIC FLUID, THERAPEUTIC FROM PRODUCTS OF CONCEPTION, VIA NATURAL OR ARTIFICIAL OPENING: ICD-10-PCS | Performed by: ADVANCED PRACTICE MIDWIFE

## 2023-11-12 PROCEDURE — 2500000004 HC RX 250 GENERAL PHARMACY W/ HCPCS (ALT 636 FOR OP/ED): Performed by: STUDENT IN AN ORGANIZED HEALTH CARE EDUCATION/TRAINING PROGRAM

## 2023-11-12 PROCEDURE — 96372 THER/PROPH/DIAG INJ SC/IM: CPT | Performed by: STUDENT IN AN ORGANIZED HEALTH CARE EDUCATION/TRAINING PROGRAM

## 2023-11-12 PROCEDURE — 59514 CESAREAN DELIVERY ONLY: CPT

## 2023-11-12 PROCEDURE — 59514 CESAREAN DELIVERY ONLY: CPT | Performed by: STUDENT IN AN ORGANIZED HEALTH CARE EDUCATION/TRAINING PROGRAM

## 2023-11-12 PROCEDURE — 88307 TISSUE EXAM BY PATHOLOGIST: CPT | Mod: TC,SUR

## 2023-11-12 PROCEDURE — 2500000004 HC RX 250 GENERAL PHARMACY W/ HCPCS (ALT 636 FOR OP/ED)

## 2023-11-12 PROCEDURE — 85384 FIBRINOGEN ACTIVITY: CPT

## 2023-11-12 PROCEDURE — 7100000016 HC LABOR RECOVERY PER HOUR: Performed by: STUDENT IN AN ORGANIZED HEALTH CARE EDUCATION/TRAINING PROGRAM

## 2023-11-12 PROCEDURE — 36415 COLL VENOUS BLD VENIPUNCTURE: CPT

## 2023-11-12 PROCEDURE — 3700000014 HC AN EPIDURAL BLOCK CHARGE: Performed by: STUDENT IN AN ORGANIZED HEALTH CARE EDUCATION/TRAINING PROGRAM

## 2023-11-12 PROCEDURE — 85730 THROMBOPLASTIN TIME PARTIAL: CPT

## 2023-11-12 PROCEDURE — 2500000004 HC RX 250 GENERAL PHARMACY W/ HCPCS (ALT 636 FOR OP/ED): Performed by: DOULA

## 2023-11-12 PROCEDURE — 2720000007 HC OR 272 NO HCPCS

## 2023-11-12 PROCEDURE — 3700000018 HC OB ANESTHESIA C-SECTION: Performed by: STUDENT IN AN ORGANIZED HEALTH CARE EDUCATION/TRAINING PROGRAM

## 2023-11-12 PROCEDURE — 10H07YZ INSERTION OF OTHER DEVICE INTO PRODUCTS OF CONCEPTION, VIA NATURAL OR ARTIFICIAL OPENING: ICD-10-PCS | Performed by: NURSE PRACTITIONER

## 2023-11-12 PROCEDURE — 51701 INSERT BLADDER CATHETER: CPT

## 2023-11-12 PROCEDURE — 2500000005 HC RX 250 GENERAL PHARMACY W/O HCPCS: Performed by: STUDENT IN AN ORGANIZED HEALTH CARE EDUCATION/TRAINING PROGRAM

## 2023-11-12 PROCEDURE — 88307 TISSUE EXAM BY PATHOLOGIST: CPT | Performed by: PATHOLOGY

## 2023-11-12 PROCEDURE — P9045 ALBUMIN (HUMAN), 5%, 250 ML: HCPCS | Mod: JZ | Performed by: STUDENT IN AN ORGANIZED HEALTH CARE EDUCATION/TRAINING PROGRAM

## 2023-11-12 RX ORDER — NORETHINDRONE AND ETHINYL ESTRADIOL 0.5-0.035
KIT ORAL AS NEEDED
Status: DISCONTINUED | OUTPATIENT
Start: 2023-11-12 | End: 2023-11-12

## 2023-11-12 RX ORDER — FENTANYL CITRATE 50 UG/ML
INJECTION, SOLUTION INTRAMUSCULAR; INTRAVENOUS AS NEEDED
Status: DISCONTINUED | OUTPATIENT
Start: 2023-11-12 | End: 2023-11-12

## 2023-11-12 RX ORDER — ADHESIVE BANDAGE
10 BANDAGE TOPICAL
Status: DISCONTINUED | OUTPATIENT
Start: 2023-11-12 | End: 2023-11-15 | Stop reason: HOSPADM

## 2023-11-12 RX ORDER — ACETAMINOPHEN 325 MG/1
975 TABLET ORAL EVERY 6 HOURS
Status: DISCONTINUED | OUTPATIENT
Start: 2023-11-12 | End: 2023-11-15 | Stop reason: HOSPADM

## 2023-11-12 RX ORDER — HYDROMORPHONE HYDROCHLORIDE 1 MG/ML
0.2 INJECTION, SOLUTION INTRAMUSCULAR; INTRAVENOUS; SUBCUTANEOUS EVERY 5 MIN PRN
Status: DISCONTINUED | OUTPATIENT
Start: 2023-11-12 | End: 2023-11-15

## 2023-11-12 RX ORDER — OXYTOCIN/0.9 % SODIUM CHLORIDE 30/500 ML
60 PLASTIC BAG, INJECTION (ML) INTRAVENOUS ONCE AS NEEDED
Status: DISCONTINUED | OUTPATIENT
Start: 2023-11-12 | End: 2023-11-14

## 2023-11-12 RX ORDER — OXYCODONE HYDROCHLORIDE 5 MG/1
10 TABLET ORAL EVERY 4 HOURS PRN
Status: DISCONTINUED | OUTPATIENT
Start: 2023-11-13 | End: 2023-11-15 | Stop reason: HOSPADM

## 2023-11-12 RX ORDER — BISACODYL 10 MG/1
10 SUPPOSITORY RECTAL DAILY PRN
Status: DISCONTINUED | OUTPATIENT
Start: 2023-11-12 | End: 2023-11-15 | Stop reason: HOSPADM

## 2023-11-12 RX ORDER — PHENYLEPHRINE HCL IN 0.9% NACL 1 MG/10 ML
SYRINGE (ML) INTRAVENOUS AS NEEDED
Status: DISCONTINUED | OUTPATIENT
Start: 2023-11-12 | End: 2023-11-12

## 2023-11-12 RX ORDER — SIMETHICONE 80 MG
80 TABLET,CHEWABLE ORAL 4 TIMES DAILY PRN
Status: DISCONTINUED | OUTPATIENT
Start: 2023-11-12 | End: 2023-11-15 | Stop reason: HOSPADM

## 2023-11-12 RX ORDER — ONDANSETRON HYDROCHLORIDE 2 MG/ML
4 INJECTION, SOLUTION INTRAVENOUS EVERY 6 HOURS PRN
Status: DISCONTINUED | OUTPATIENT
Start: 2023-11-12 | End: 2023-11-15 | Stop reason: HOSPADM

## 2023-11-12 RX ORDER — ONDANSETRON 4 MG/1
4 TABLET, FILM COATED ORAL EVERY 6 HOURS PRN
Status: DISCONTINUED | OUTPATIENT
Start: 2023-11-12 | End: 2023-11-15 | Stop reason: HOSPADM

## 2023-11-12 RX ORDER — LOPERAMIDE HYDROCHLORIDE 2 MG/1
4 CAPSULE ORAL EVERY 2 HOUR PRN
Status: DISCONTINUED | OUTPATIENT
Start: 2023-11-12 | End: 2023-11-15 | Stop reason: HOSPADM

## 2023-11-12 RX ORDER — MISOPROSTOL 200 UG/1
800 TABLET ORAL ONCE AS NEEDED
Status: DISCONTINUED | OUTPATIENT
Start: 2023-11-12 | End: 2023-11-15 | Stop reason: HOSPADM

## 2023-11-12 RX ORDER — LIDOCAINE 560 MG/1
1 PATCH PERCUTANEOUS; TOPICAL; TRANSDERMAL
Status: DISCONTINUED | OUTPATIENT
Start: 2023-11-12 | End: 2023-11-15 | Stop reason: HOSPADM

## 2023-11-12 RX ORDER — TRANEXAMIC ACID 100 MG/ML
1000 INJECTION, SOLUTION INTRAVENOUS ONCE AS NEEDED
Status: DISCONTINUED | OUTPATIENT
Start: 2023-11-12 | End: 2023-11-15 | Stop reason: HOSPADM

## 2023-11-12 RX ORDER — CEFAZOLIN 1 G/1
INJECTION, POWDER, FOR SOLUTION INTRAVENOUS AS NEEDED
Status: DISCONTINUED | OUTPATIENT
Start: 2023-11-12 | End: 2023-11-12

## 2023-11-12 RX ORDER — HYDRALAZINE HYDROCHLORIDE 20 MG/ML
5 INJECTION INTRAMUSCULAR; INTRAVENOUS ONCE AS NEEDED
Status: DISCONTINUED | OUTPATIENT
Start: 2023-11-12 | End: 2023-11-15 | Stop reason: HOSPADM

## 2023-11-12 RX ORDER — DIPHENHYDRAMINE HYDROCHLORIDE 50 MG/ML
25 INJECTION INTRAMUSCULAR; INTRAVENOUS EVERY 4 HOURS PRN
Status: DISCONTINUED | OUTPATIENT
Start: 2023-11-12 | End: 2023-11-15

## 2023-11-12 RX ORDER — KETOROLAC TROMETHAMINE 30 MG/ML
INJECTION, SOLUTION INTRAMUSCULAR; INTRAVENOUS AS NEEDED
Status: DISCONTINUED | OUTPATIENT
Start: 2023-11-12 | End: 2023-11-12

## 2023-11-12 RX ORDER — DIPHENHYDRAMINE HCL 25 MG
25 CAPSULE ORAL EVERY 4 HOURS PRN
Status: DISCONTINUED | OUTPATIENT
Start: 2023-11-12 | End: 2023-11-15 | Stop reason: HOSPADM

## 2023-11-12 RX ORDER — KETOROLAC TROMETHAMINE 30 MG/ML
30 INJECTION, SOLUTION INTRAMUSCULAR; INTRAVENOUS EVERY 6 HOURS
Status: COMPLETED | OUTPATIENT
Start: 2023-11-12 | End: 2023-11-13

## 2023-11-12 RX ORDER — NIFEDIPINE 10 MG/1
10 CAPSULE ORAL ONCE AS NEEDED
Status: DISCONTINUED | OUTPATIENT
Start: 2023-11-12 | End: 2023-11-15 | Stop reason: HOSPADM

## 2023-11-12 RX ORDER — OXYCODONE HYDROCHLORIDE 5 MG/1
5 TABLET ORAL EVERY 4 HOURS PRN
Status: DISCONTINUED | OUTPATIENT
Start: 2023-11-13 | End: 2023-11-15 | Stop reason: HOSPADM

## 2023-11-12 RX ORDER — OXYTOCIN 10 [USP'U]/ML
10 INJECTION, SOLUTION INTRAMUSCULAR; INTRAVENOUS ONCE AS NEEDED
Status: DISCONTINUED | OUTPATIENT
Start: 2023-11-12 | End: 2023-11-14

## 2023-11-12 RX ORDER — LABETALOL HYDROCHLORIDE 5 MG/ML
20 INJECTION, SOLUTION INTRAVENOUS ONCE AS NEEDED
Status: DISCONTINUED | OUTPATIENT
Start: 2023-11-12 | End: 2023-11-15 | Stop reason: HOSPADM

## 2023-11-12 RX ORDER — MORPHINE SULFATE 0.5 MG/ML
INJECTION, SOLUTION EPIDURAL; INTRATHECAL; INTRAVENOUS AS NEEDED
Status: DISCONTINUED | OUTPATIENT
Start: 2023-11-12 | End: 2023-11-12

## 2023-11-12 RX ORDER — POLYETHYLENE GLYCOL 3350 17 G/17G
17 POWDER, FOR SOLUTION ORAL 2 TIMES DAILY PRN
Status: DISCONTINUED | OUTPATIENT
Start: 2023-11-12 | End: 2023-11-14

## 2023-11-12 RX ORDER — CARBOPROST TROMETHAMINE 250 UG/ML
250 INJECTION, SOLUTION INTRAMUSCULAR ONCE AS NEEDED
Status: DISCONTINUED | OUTPATIENT
Start: 2023-11-12 | End: 2023-11-15 | Stop reason: HOSPADM

## 2023-11-12 RX ORDER — FAMOTIDINE 10 MG/ML
INJECTION INTRAVENOUS AS NEEDED
Status: DISCONTINUED | OUTPATIENT
Start: 2023-11-12 | End: 2023-11-12

## 2023-11-12 RX ORDER — FENTANYL/BUPIVACAINE/NS/PF 2MCG/ML-.1
0-54 PLASTIC BAG, INJECTION (ML) INJECTION CONTINUOUS
Status: DISCONTINUED | OUTPATIENT
Start: 2023-11-12 | End: 2023-11-12

## 2023-11-12 RX ORDER — IBUPROFEN 600 MG/1
600 TABLET ORAL EVERY 6 HOURS
Status: DISCONTINUED | OUTPATIENT
Start: 2023-11-13 | End: 2023-11-15 | Stop reason: HOSPADM

## 2023-11-12 RX ORDER — ALBUMIN HUMAN 50 G/1000ML
SOLUTION INTRAVENOUS AS NEEDED
Status: DISCONTINUED | OUTPATIENT
Start: 2023-11-12 | End: 2023-11-12

## 2023-11-12 RX ORDER — NALBUPHINE HYDROCHLORIDE 10 MG/ML
5 INJECTION, SOLUTION INTRAMUSCULAR; INTRAVENOUS; SUBCUTANEOUS
Status: ACTIVE | OUTPATIENT
Start: 2023-11-12 | End: 2023-11-13

## 2023-11-12 RX ORDER — FENTANYL/BUPIVACAINE/NS/PF 2MCG/ML-.1
PLASTIC BAG, INJECTION (ML) INJECTION AS NEEDED
Status: DISCONTINUED | OUTPATIENT
Start: 2023-11-12 | End: 2023-11-12

## 2023-11-12 RX ORDER — METHYLERGONOVINE MALEATE 0.2 MG/ML
0.2 INJECTION INTRAVENOUS ONCE AS NEEDED
Status: DISCONTINUED | OUTPATIENT
Start: 2023-11-12 | End: 2023-11-15 | Stop reason: HOSPADM

## 2023-11-12 RX ORDER — ENOXAPARIN SODIUM 100 MG/ML
60 INJECTION SUBCUTANEOUS EVERY 24 HOURS
Status: DISCONTINUED | OUTPATIENT
Start: 2023-11-13 | End: 2023-11-15 | Stop reason: HOSPADM

## 2023-11-12 RX ORDER — DEXMEDETOMIDINE IN 0.9 % NACL 20 MCG/5ML
SYRINGE (ML) INTRAVENOUS AS NEEDED
Status: DISCONTINUED | OUTPATIENT
Start: 2023-11-12 | End: 2023-11-12

## 2023-11-12 RX ORDER — LIDOCAINE HCL/EPINEPHRINE/PF 2%-1:200K
VIAL (ML) INJECTION AS NEEDED
Status: DISCONTINUED | OUTPATIENT
Start: 2023-11-12 | End: 2023-11-12

## 2023-11-12 RX ORDER — NALOXONE HYDROCHLORIDE 0.4 MG/ML
0.1 INJECTION, SOLUTION INTRAMUSCULAR; INTRAVENOUS; SUBCUTANEOUS EVERY 5 MIN PRN
Status: DISCONTINUED | OUTPATIENT
Start: 2023-11-12 | End: 2023-11-15 | Stop reason: HOSPADM

## 2023-11-12 RX ORDER — OXYTOCIN 10 [USP'U]/ML
10 INJECTION, SOLUTION INTRAMUSCULAR; INTRAVENOUS ONCE AS NEEDED
Status: DISCONTINUED | OUTPATIENT
Start: 2023-11-12 | End: 2023-11-15 | Stop reason: HOSPADM

## 2023-11-12 RX ORDER — DEXAMETHASONE SODIUM PHOSPHATE 4 MG/ML
INJECTION, SOLUTION INTRA-ARTICULAR; INTRALESIONAL; INTRAMUSCULAR; INTRAVENOUS; SOFT TISSUE AS NEEDED
Status: DISCONTINUED | OUTPATIENT
Start: 2023-11-12 | End: 2023-11-12

## 2023-11-12 RX ADMIN — ALBUMIN HUMAN 250 ML: 0.05 INJECTION, SOLUTION INTRAVENOUS at 16:01

## 2023-11-12 RX ADMIN — AZITHROMYCIN 500 MG: 500 INJECTION, POWDER, LYOPHILIZED, FOR SOLUTION INTRAVENOUS at 14:20

## 2023-11-12 RX ADMIN — Medication 8 MCG: at 15:19

## 2023-11-12 RX ADMIN — KETOROLAC TROMETHAMINE 15 MG: 30 INJECTION, SOLUTION INTRAMUSCULAR at 15:13

## 2023-11-12 RX ADMIN — NORETHINDRONE AND ETHINYL ESTRADIOL 10 MG: KIT ORAL at 16:23

## 2023-11-12 RX ADMIN — DEXAMETHASONE SODIUM PHOSPHATE 4 MG: 4 INJECTION INTRA-ARTICULAR; INTRALESIONAL; INTRAMUSCULAR; INTRAVENOUS; SOFT TISSUE at 14:30

## 2023-11-12 RX ADMIN — FENTANYL CITRATE 25 MCG: 50 INJECTION, SOLUTION INTRAMUSCULAR; INTRAVENOUS at 15:29

## 2023-11-12 RX ADMIN — Medication 4 MCG: at 14:40

## 2023-11-12 RX ADMIN — Medication 4 MCG: at 14:54

## 2023-11-12 RX ADMIN — METOCLOPRAMIDE 5 MG: 5 INJECTION, SOLUTION INTRAMUSCULAR; INTRAVENOUS at 14:30

## 2023-11-12 RX ADMIN — Medication 2 ML: at 04:10

## 2023-11-12 RX ADMIN — SODIUM CHLORIDE, POTASSIUM CHLORIDE, SODIUM LACTATE AND CALCIUM CHLORIDE 500 ML: 600; 310; 30; 20 INJECTION, SOLUTION INTRAVENOUS at 03:24

## 2023-11-12 RX ADMIN — Medication 14 ML/HR: at 04:10

## 2023-11-12 RX ADMIN — Medication 4 MCG: at 14:38

## 2023-11-12 RX ADMIN — FENTANYL CITRATE 100 MCG: 50 INJECTION, SOLUTION INTRAMUSCULAR; INTRAVENOUS at 14:22

## 2023-11-12 RX ADMIN — ALBUMIN HUMAN 250 ML: 0.05 INJECTION, SOLUTION INTRAVENOUS at 15:50

## 2023-11-12 RX ADMIN — Medication 4 ML: at 04:04

## 2023-11-12 RX ADMIN — SODIUM CHLORIDE, POTASSIUM CHLORIDE, SODIUM LACTATE AND CALCIUM CHLORIDE 125 ML/HR: 600; 310; 30; 20 INJECTION, SOLUTION INTRAVENOUS at 06:15

## 2023-11-12 RX ADMIN — ACETAMINOPHEN 975 MG: 325 TABLET ORAL at 21:35

## 2023-11-12 RX ADMIN — CEFAZOLIN 2 G: 330 INJECTION, POWDER, FOR SOLUTION INTRAMUSCULAR; INTRAVENOUS at 15:39

## 2023-11-12 RX ADMIN — SODIUM CHLORIDE, POTASSIUM CHLORIDE, SODIUM LACTATE AND CALCIUM CHLORIDE 125 ML/HR: 600; 310; 30; 20 INJECTION, SOLUTION INTRAVENOUS at 04:20

## 2023-11-12 RX ADMIN — FENTANYL CITRATE 25 MCG: 50 INJECTION, SOLUTION INTRAMUSCULAR; INTRAVENOUS at 15:31

## 2023-11-12 RX ADMIN — FAMOTIDINE 20 MG: 10 INJECTION, SOLUTION INTRAVENOUS at 13:40

## 2023-11-12 RX ADMIN — EPHEDRINE SULFATE 25 MG: 50 INJECTION, SOLUTION INTRAVENOUS at 16:20

## 2023-11-12 RX ADMIN — Medication 4 MCG: at 14:42

## 2023-11-12 RX ADMIN — MORPHINE SULFATE 3 MG: 0.5 INJECTION EPIDURAL; INTRATHECAL; INTRAVENOUS at 15:12

## 2023-11-12 RX ADMIN — Medication 8 MCG: at 14:57

## 2023-11-12 RX ADMIN — Medication 120 MCG: at 15:46

## 2023-11-12 RX ADMIN — Medication 80 MCG: at 15:30

## 2023-11-12 RX ADMIN — ONDANSETRON 4 MG: 2 INJECTION INTRAMUSCULAR; INTRAVENOUS at 13:40

## 2023-11-12 RX ADMIN — LIDOCAINE HYDROCHLORIDE,EPINEPHRINE BITARTRATE 5 ML: 20; .005 INJECTION, SOLUTION EPIDURAL; INFILTRATION; INTRACAUDAL; PERINEURAL at 13:45

## 2023-11-12 RX ADMIN — Medication 4 MCG: at 14:31

## 2023-11-12 RX ADMIN — KETOROLAC TROMETHAMINE 30 MG: 30 INJECTION, SOLUTION INTRAMUSCULAR; INTRAVENOUS at 21:34

## 2023-11-12 RX ADMIN — CEFAZOLIN 2 G: 330 INJECTION, POWDER, FOR SOLUTION INTRAMUSCULAR; INTRAVENOUS at 14:29

## 2023-11-12 RX ADMIN — METOCLOPRAMIDE 5 MG: 5 INJECTION, SOLUTION INTRAMUSCULAR; INTRAVENOUS at 14:27

## 2023-11-12 RX ADMIN — LIDOCAINE HYDROCHLORIDE,EPINEPHRINE BITARTRATE 3 ML: 20; .005 INJECTION, SOLUTION EPIDURAL; INFILTRATION; INTRACAUDAL; PERINEURAL at 15:36

## 2023-11-12 RX ADMIN — LIDOCAINE HYDROCHLORIDE,EPINEPHRINE BITARTRATE 2 ML: 20; .005 INJECTION, SOLUTION EPIDURAL; INFILTRATION; INTRACAUDAL; PERINEURAL at 15:39

## 2023-11-12 RX ADMIN — AZITHROMYCIN 500 MG: 500 INJECTION, POWDER, LYOPHILIZED, FOR SOLUTION INTRAVENOUS at 15:40

## 2023-11-12 RX ADMIN — LIDOCAINE HYDROCHLORIDE,EPINEPHRINE BITARTRATE 5 ML: 20; .005 INJECTION, SOLUTION EPIDURAL; INFILTRATION; INTRACAUDAL; PERINEURAL at 14:14

## 2023-11-12 RX ADMIN — SODIUM CHLORIDE, POTASSIUM CHLORIDE, SODIUM LACTATE AND CALCIUM CHLORIDE: 600; 310; 30; 20 INJECTION, SOLUTION INTRAVENOUS at 14:46

## 2023-11-12 RX ADMIN — Medication 4 MCG: at 14:35

## 2023-11-12 RX ADMIN — Medication 6 MCG: at 15:13

## 2023-11-12 RX ADMIN — Medication 4 ML: at 04:07

## 2023-11-12 SDOH — HEALTH STABILITY: MENTAL HEALTH: CURRENT SMOKER: 0

## 2023-11-12 ASSESSMENT — PAIN SCALES - GENERAL
PAINLEVEL_OUTOF10: 0 - NO PAIN
PAINLEVEL_OUTOF10: 0 - NO PAIN
PAINLEVEL_OUTOF10: 2
PAINLEVEL_OUTOF10: 0 - NO PAIN
PAINLEVEL_OUTOF10: 5 - MODERATE PAIN
PAINLEVEL_OUTOF10: 0 - NO PAIN
PAINLEVEL_OUTOF10: 4
PAINLEVEL_OUTOF10: 0 - NO PAIN
PAINLEVEL_OUTOF10: 0 - NO PAIN
PAINLEVEL_OUTOF10: 3
PAINLEVEL_OUTOF10: 0 - NO PAIN
PAINLEVEL_OUTOF10: 2
PAINLEVEL_OUTOF10: 0 - NO PAIN
PAINLEVEL_OUTOF10: 0 - NO PAIN
PAINLEVEL_OUTOF10: 4
PAINLEVEL_OUTOF10: 0 - NO PAIN

## 2023-11-12 ASSESSMENT — ACTIVITIES OF DAILY LIVING (ADL): LACK_OF_TRANSPORTATION: NO

## 2023-11-12 ASSESSMENT — PAIN - FUNCTIONAL ASSESSMENT: PAIN_FUNCTIONAL_ASSESSMENT: 0-10

## 2023-11-12 NOTE — PROGRESS NOTES
Assessment    32 y.o.  at 39w2d  FHT Category 2, overall reassuring for moderate variability  Active labor  cHTN, BP normotensive  GBS neg    Plan    IUPC placed  Continue pitocin per protocol  Patient status and plan of care reviewed with Dr. Trujillo and Dr. Butler  Continue assessment of maternal and fetal well-being  Recheck as clinically indicated by maternal or fetal status  Anticipate second stage of labor    Layne Shaw, APRN-CNM, APRN-CNP    Subjective:  Rita Mckeon is comfortable with epidural, denies feeling rectal pressure or urge to push.    Objective:  Fetal Monitoring      Baseline FHR: 140 per minute  Variability: moderate  Accelerations: no  Decelerations: variable, occasional  TOCO: regular, every 2-3 minutes    Cervical Exam:  8 cm dilated, 80 effaced, -2 station; presenting part continues to not be well applied to cervix    Membrane status: ruptured, (AROM at 0626)     Pitocin is at 12 mu/min.    Vitals:    23 1246 23 1249 23 1251 23 1256   BP:  125/69     Pulse: 86 76 79 79   Resp:       Temp:       TempSrc:       SpO2: 100%  100% 99%   Weight:       Height:

## 2023-11-12 NOTE — PROGRESS NOTES
Intrapartum Progress Note  Assessment    Rita Mckeon is a 32 y.o.  at 39w2d. MARI: 2023, by Last Menstrual Period.   FHT Cat II - overall reassuring for moderate variability   Active labor  cHTN, BP overall normotensive  GBS neg    Plan    Okay to restart pitocin per protocol  Encourage frequent position changes as tolerated  CEFM  Continue assessment of maternal and fetal well-being  Recheck as clinically indicated by maternal or fetal status  .svb    Layne Shaw, MEE, APRN-CNP    Pregnancy Problems (from 23 to present)       Problem Noted Resolved    39 weeks gestation of pregnancy 2023 by EME Butler No    Abnormal glucose tolerance in pregnancy 10/12/2023 by MEE Cheek, APRN-CNP No    Overview Addendum 10/14/2023  2:49 PM by MEE Becerra       Normal 3 hr gtt         Chronic hypertension affecting pregnancy 10/12/2023 by MEE hCeek, APRN-CNP No    Overview Addendum 10/14/2023  2:47 PM by MEE Becerra       No meds          Subjective   Patient comfortable with epidural. Pitocin turned off around 0700 for recurrent deep variables. Tracing noted to have minimal variability and therefore restart of pitocin after 30 minutes deferred.     Objective   Last Vitals:  Temp Pulse Resp BP MAP Pulse Ox   36.8 °C (98.2 °F) 84 18 127/73   99 %     Vitals Min/Max Last 24 Hours:  Temp  Min: 36.5 °C (97.7 °F)  Max: 37.6 °C (99.7 °F)  Pulse  Min: 68  Max: 156  Resp  Min: 16  Max: 18  BP  Min: 113/61  Max: 143/71    Intake/Output:    Intake/Output Summary (Last 24 hours) at 2023 0822  Last data filed at 2023 0701  Gross per 24 hour   Intake 2700.08 ml   Output 300 ml   Net 2400.08 ml       Physical Examination:  GENERAL: Examination reveals a well developed, well nourished, gravid female in no acute distress. She is alert and cooperative.  LUNGS: normal respiratory effort  Current Estimated Fetal  Weight 9# established by Leopold's maneuver  NEUROLOGICAL: alert, oriented, normal speech, no focal findings or movement disorder noted  PSYCHOLOGICAL: awake and alert; oriented to person, place, and time    Fetal Monitoring      Baseline FHR: 140 per minute  Variability: moderate  Accelerations: yes  Decelerations: variable, occasional  TOCO: difficult to ascertain as not tracing well on TOCO      Lab Review:  Lab Results   Component Value Date    ABO O 11/11/2023    LABRH POS 11/11/2023    ABSCRN NEG 11/11/2023     Lab Results   Component Value Date    WBC 9.4 11/11/2023    HGB 12.1 11/11/2023    HCT 37.6 11/11/2023     (L) 11/11/2023     Group B Strep Screen   Date Value Ref Range Status   10/26/2023 No Group B Streptococcus (GBS) isolated  Final

## 2023-11-12 NOTE — ANESTHESIA PROCEDURE NOTES
Epidural Block    Patient location during procedure: OB  Start time: 11/12/2023 3:47 AM  End time: 11/12/2023 4:18 AM  Reason for block: labor analgesia  Staffing  Performed: resident   Authorized by: Nigel Newman MD    Performed by: Jaylyn Renteria MD    Preanesthetic Checklist  Completed: patient identified, IV checked, risks and benefits discussed, surgical consent, pre-op evaluation, timeout performed and sterile techniques followed  Block Timeout  RN/Licensed healthcare professional reads aloud to the Anesthesia provider and entire team: Patient identity, procedure with side and site, patient position, and as applicable the availability of implants/special equipment/special requirements.  Patient on coagulant treatment: no  Timeout performed at: 11/12/2023 3:47 AM  Block Placement  Patient position: sitting  Prep: ChloraPrep  Sterility prep: cap, gloves, mask, hand and drape  Sedation level: no sedation  Patient monitoring: blood pressure, continuous pulse oximetry and heart rate  Approach: midline  Local numbing: lidocaine 1% to skin and subcutaneous tissues  Vertebral space: lumbar  Lumbar location: L3-L4  Epidural  Loss of resistance technique: saline  Guidance: landmark technique        Needle  Needle type: Tuohy   Needle gauge: 17  Needle length: 10 cm  Needle insertion depth: 9.5 cm  Catheter type: multi-orifice  Catheter size: 20 G  Catheter at skin depth: 14.5 cm  Catheter securement method: clear occlusive dressing and liquid medical adhesive    Test dose: lidocaine 1.5% with epinephrine 1-to-200,000  Test dose given at 11/12/2023 4:02 AM  Test dose: lidocaine 1.5% with epinephrine 1-to-200,000  Test dose result: no positive test dose    PCEA  Medication concentration used: 0.044% Bupivacaine with 1.25 mcg/mL Fentanyl and [id number] Epinephrine  Dose (mL): 10  Lockout (minutes): 15  1-Hour Limit (boluses/hr): 4  Basal Rate: 14        Assessment  Sensory level: T8. bilateral  Block outcome: patient  comfortable  Number of attempts: 1  Events: no positive test dose  Procedure assessment: patient tolerated procedure well with no immediate complications

## 2023-11-12 NOTE — PROGRESS NOTES
Pt resting comfortably with epidural, ready for arom.  , mod variability, +accels, occas early and variable decel.  [place] q2-3 min.  Pit @ 18mu.  SVE 7/80/-2, arom'd for clear fluid.  Continue current course.  Anticipate svb.    Evelyn hawk cnm

## 2023-11-12 NOTE — PROGRESS NOTES
Assessment    32 y.o.  at 39w2d  FHT Category 2, overall reassuring for moderate variability  Active labor  cHTN, BP normotensive  GBS neg    Plan    BSUS performed by Dr. Brandon to confirm cephalic presentation  Maternal repositioning, patient repositioned to hands and knees  Continue pitocin per protocol  Continue assessment of maternal and fetal wellbeing  Recheck as clinically indicated by maternal or fetal status  Anticipate second stage of labor    Layne Shaw, APRN-CNM, APRN-CNP    Subjective:  Rita Mckeon is comfortable with epidural, reports feeling urge to push.    Objective:  Fetal Monitoring      Baseline FHR: 140 per minute  Variability: moderate  Accelerations: yes  Decelerations: late and early decels noted  TOCO: regular, every 3-5 minutes    Cervical Exam:  8 cm dilated, 80 effaced, -2 station, presenting part not well applied to cervix    Membrane status: ruptured (AROM at 0626)    Pitocin is at 8 mu/min.    Vitals:    23 0920 23 0925 23 0930 23 0936   BP:    113/68   Pulse: 83 85 84 83   Resp:   18    Temp:   36.4 °C (97.5 °F)    TempSrc:   Temporal    SpO2: 100% 100% 100%    Weight:       Height:

## 2023-11-12 NOTE — PROGRESS NOTES
Pt sitting in bed visiting with family, says she feels ctx but doing ok with them. Would like to be checked but doesn't want AROM yet.  , mod variability, +accels, no decels.  [place] q2-4 min.  Pit @ 6mu.  SVE 3/50/-3.  Continue current course.  Consider arom with next exam.  Pain management as needed.  Anticipate svb.    Evelyn hawk cnm

## 2023-11-12 NOTE — L&D DELIVERY NOTE
OB Delivery Note  2023  Rita Mckeon  32 y.o.   , Low Transverse    Gestational Age: 39w2d  /Para:   Quantitative Blood Loss: Admission to Discharge: 2096 mL (2023  1:54 PM - 2023  6:26 PM)    Procedures    * OBGYN Delivery  Section    Surgeons      * Fortino Trujillo MD - Primary    Resident/Fellow/Other Assistant:  Surgeon(s) and Role:     * Usha Brandon MD - Assisting     * Maria Isabel De Los Santos MD - Assisting    Procedure Summary  Anesthesia: * Epidural *  ASA: III  Anesthesia Staff: Anesthesiologist: Klaus Wolfe DO; Nigel Newman MD  Anesthesia Resident: Wilfredo Forde MD; Jaylyn Renteria MD  Estimated Blood Loss: 2000 mL           Anesthesia Record               Intraprocedure I/O Totals          Intake    Oxytocin Drip 120.53 mL    The total shown is the total volume documented since Anesthesia Start was filed.    lactated Ringer's infusion 2577.08 mL    fentaNYL (PF)-BUPivacaine - Epi 1.25 mcg/mL- 0.044 % epidural infusion 134.17 mL    Total Intake 2831.78 mL       Output    Urine 595 mL    Total Output 595 mL       Net    Net Volume 2236.78 mL          Specimen: Placenta    Staff:   Scrub Person: Brigitte Gurrola [[id number]]      Labor Events    Rupture date/time: 2023 0626  Rupture type: Artificial  Fluid color: Clear  Fluid odor: None  Labor type: Induced Onset of Labor  Labor allowed to proceed with plans for an attempted vaginal birth?: Yes  Induction: Oxytocin  Complications: Failure to Progress in First Stage  Additional complications: Arrested active phase of labor       Labor Event Times    Labor onset date/time: 2023 1700  Decision date/time (emergent ): 2023 1329       Labor Length    3rd stage: 0h 03m       Placenta    Placenta delivery date/time: 2023 1500  Placenta removal: Expressed  Placenta appearance: Intact  Placenta disposition: pathology       Cord    Vessels:  3 vessels  Complications: None  Delayed cord clamping?: Yes  Cord clamped date/time: 2023 1457  Cord blood disposition: Discarded  Gases sent?: No  Stem cell collection (by provider): No       Lacerations    Episiotomy: None  Perineal laceration: None  Other lacerations?: No  Repair suture: None       Anesthesia    Method: Epidural       Operative Delivery    Forceps attempted?: No  Vacuum extractor attempted?: No       Shoulder Dystocia    Shoulder dystocia present?: No        Delivery    Time head delivered: 2023 14:57:00  Birth date/time: 20[birth year] 14:57:00  Delivery type: , Low Transverse   categorization: primary   priority: routine  Indications for : Arrest of Active Phase  Incision type: low transverse  Complications: Failure to Progress in First Stage       Resuscitation    Method: Suctioning, Tactile stimulation       Apgars    Living status: Living  Apgar Component Scores:  1 min.:  5 min.:  10 min.:  15 min.:  20 min.:    Skin color:  1  1       Heart rate:  2  2       Reflex irritability:  2  2       Muscle tone:  1  2       Respiratory effort:  2  2       Total:  8  9       Apgars assigned by: KIKE CHACON       Delivery Providers    Delivering clinician: Fortino Trujillo MD   Provider Role    Callie Tucker, JANE Delivery Nurse    Garima Hummel RN Nursery Nurse    Usha Brandon MD Resident    Maria Isabel De Los Santos MD Resident                 Operative Report    Findings: Normal appearing gravid uterus, fallopian tubes, and ovaries. Amniotic fluid clear, infant in vertex presentation     Indication for Procedure: 31 yo  at 39.2wga undergoing induction of labor at term. Patient diagnosed with arrest of active phase of labor after no cervical change from 8cm after over 3 hours. Decision made to proceed with non-scheduled, non-urgent  section.    Procedure: Pt was taken to the OR where epidural anesthesia was re-dosded.  She was  then placed in the dorsal supine position with a left lateral tilt. A vazquez catheter was placed, SCDs were applied, and a vaginal prep was performed. A pre-procedure time out was performed.  The pt was given prophylactic dose of IV antibiotics.  She was then prepped and draped in the usual sterile fashion. A Pfannenstiel skin incision was made with the scalpel through the skin and subcutaneous fat to the underlying fascial layer. The fascia was incised in the midline with the scalpel and the incision was extended bilaterally. The superior aspect of the incision was grasped, tented up with Kocher clamps and the rectus muscle was dissected off. The muscles were divided in the midline. Bowel and mesentery were tucked with tagged tapes. The peritoneum was then identified and entered bluntly and incision extended laterally taking care to avoid underlying viscera.  The bladder blade was inserted.     A low transverse uterine incision was made with the scalpel, the uterine cavity was entered, and the hysterotomy was extended bilaterally by cephalocaudal stretch. The infant was delivered atraumatically, the cord was clamped and cut and infant was handed off to awaiting nursing staff. A blood sample was collected. Cord gasses were not sent. The placenta was then expressed and remaining placental portions were removed with ring forceps. The uterus was exteriorized and cleared of all clot and debris. The uterine incision was repaired in two layers. The most proximal layer of the hysterotomy was closed using a running stitch of 0-Vicryl. A second layer of the same suture was placed in a vertical imbricating fashion. Additional figure of eight sutures were placed for hemostasis. Small bleeders were cauterized. Hemostasis was noted.     The uterus was then placed back inside the pelvis. The hysterotomy was again evaluated and additional figure of eight sutures were placed for hemostasis and pressure was held on the hysterotomy.  Doug was applied to the hysterotomy, which was then found to be hemostatic. The tucked tapes were removed from the abdomen. The underside of the fascia and bladder and the rectus muscles were surveyed and small bleeders were cauterized until they were found to be hemostatic. The fascia was closed using a running stitch of 0-PDS.  The subcutaneous layer was irrigated and the subcutaneous layer was reapproximated using a running stitch of 2-0 monocryl. The skin was closed with 4-0 Vicryl.  All counts were correct per nursing staff, the patient tolerated the procedure well. Dr. Trujillo was present for all critical portions of the procedure. The patient was taken back to the recovery room in stable condition.     Maria Isabel De Los Santos MD

## 2023-11-12 NOTE — PROGRESS NOTES
Pt sitting up in bed, ctx still not too painful, ready for cervical check and arom.  , mod variability, +accels, no decels.  [place] q2-6 min.  Pit @ 18mu.  SVE 3/50/-3, pt unable to tolerate arom attempt.  Continue pit, pt will get epidural shortly and then will reattempt AROM with next exam.  Anticipate svb.    Evelyn hawk cnm

## 2023-11-12 NOTE — PROGRESS NOTES
"Assessment    32 y.o.  at 39w1d  FHT Category 1  Latent labor  Term IOL   VSS  cHTN no meds     Plan    CEFM   Encourage frequent position changes as tolerated  Start/Continue pitocin per protocol  Pain management per patient request  Continue assessment of maternal and fetal wellbeing  Recheck as clinically indicated by maternal or fetal status  Anticipate active phase of labor    She Pradhan, APRN-CNM    Subjective:  Rita Mckeon is resting in semi reclined position with peanut ball.     Objective:  Fetal Monitoring      Baseline FHR: 135 per minute  Variability: moderate  Accelerations: yes  Decelerations: none  TOCO: regular, every 4 minutes    Cervical Exam: deferred     Membrane status: intact    Pitocin is at 4 mu/min.    Vitals:    23 1735 23 1736 23 1826 23 1912   BP:  125/79 129/77 129/75   Pulse: 88 88 77 (!) 114   Resp:  18 18 18   Temp:   36.8 °C (98.2 °F) 37.6 °C (99.7 °F)   TempSrc:   Temporal Temporal   SpO2: 100% 100% 98% 99%   Weight:       Height:   1.676 m (5' 6\")       "

## 2023-11-12 NOTE — PROGRESS NOTES
Assessment    32 y.o.  at 39w2d  FHT Category 2  Active labor  cHTN  GBS neg    Plan    Discussed concern for arrest of active phase of labor, now with Cat II tracing  Dr. Butler to bedside to make recommendation for CS  Patient agreeable with plan, non-urgent CS called by MD team.    Layne Shaw, APRN-CNM, APRN-CNP    Subjective:  At bedside to assess for recurrent late decels. Patient currently supine for straight cath. Subsequent late decel noted after maternal repositioning. Dr. Butler at bedside to assess.     Objective:  Fetal Monitoring      Baseline FHR: 140 per minute  Variability: moderate  Accelerations: no  Decelerations: late, recurrent  TOCO: regular, every 3 minutes    Cervical Exam:  per Dr. Butler - 6-7 cm dilated, 80 effaced, -2 station, cervical edema noted    Membrane status: ruptured (AROM at 0626)      Pitocin is now off.    Vitals:    23 1249 23 1251 23 1256 23 1309   BP: 125/69      Pulse: 76 79 79 70   Resp:       Temp:       TempSrc:       SpO2:  100% 99% 99%   Weight:       Height:

## 2023-11-12 NOTE — ANESTHESIA PREPROCEDURE EVALUATION
Patient: Rita Mckeon    Evaluation Method: In-person visit    Procedure Information    Date: 11/12/23  Procedure: Labor Analgesia         Relevant Problems   Anesthesia (within normal limits)      Cardiovascular   (+) Chronic hypertension affecting pregnancy      Endocrine (within normal limits)      GI (within normal limits)      /Renal (within normal limits)      Neuro/Psych (within normal limits)      Pulmonary (within normal limits)      GI/Hepatic (within normal limits)      Hematology (within normal limits)      Musculoskeletal (within normal limits)       Clinical information reviewed:    Allergies  Meds               NPO Detail:  No data recorded     OB/Gyn Evaluation    Present Pregnancy    Patient is pregnant now.   Obstetric History                Physical Exam    Airway  Mallampati: II  TM distance: >3 FB  Neck ROM: limited     Cardiovascular   Rhythm: regular  Rate: normal     Dental    Pulmonary    Abdominal            Anesthesia Plan    ASA 3     epidural     The patient is not a current smoker.  Patient did not smoke on day of procedure.    Anesthetic plan and risks discussed with patient.  Use of blood products discussed with patient who consented to blood products.    Plan discussed with resident.

## 2023-11-12 NOTE — ANESTHESIA POSTPROCEDURE EVALUATION
Patient: Rita Mckeon    Procedure Summary       Date: 23 Room / Location: [facility]    Anesthesia Start: 347 Anesthesia Stop:     Procedure: OBGYN Delivery  Section Diagnosis: (Failure to Progress)    Surgeons: Catherine Butler MD Responsible Provider: Klaus Wolfe DO    Anesthesia Type: epidural ASA Status: 3            Anesthesia Type: epidural. Non-urgent c section for failure to progress    Vitals Value Taken Time   /73 23 1642   Temp 36.4 23 1645   Pulse 96 23 1642   Resp 18 23 1645   SpO2 99 % 23 1641       Anesthesia Post Evaluation    Patient location during evaluation: bedside  Patient participation: complete - patient participated  Level of consciousness: awake and alert  Pain management: adequate  Airway patency: patent  Cardiovascular status: acceptable  Respiratory status: acceptable  Hydration status: acceptable  Comments: No nausea or vomiting         No notable events documented. Will follow up tomorrow morning

## 2023-11-13 PROBLEM — Z3A.39 39 WEEKS GESTATION OF PREGNANCY (HHS-HCC): Status: RESOLVED | Noted: 2023-11-11 | Resolved: 2023-11-13

## 2023-11-13 PROBLEM — I10 CHRONIC HYPERTENSION: Status: ACTIVE | Noted: 2023-10-12

## 2023-11-13 LAB
ERYTHROCYTE [DISTWIDTH] IN BLOOD BY AUTOMATED COUNT: 14.1 % (ref 11.5–14.5)
HCT VFR BLD AUTO: 25.3 % (ref 36–46)
HGB BLD-MCNC: 8 G/DL (ref 12–16)
MCH RBC QN AUTO: 26.9 PG (ref 26–34)
MCHC RBC AUTO-ENTMCNC: 31.6 G/DL (ref 32–36)
MCV RBC AUTO: 85 FL (ref 80–100)
NRBC BLD-RTO: 0 /100 WBCS (ref 0–0)
PLATELET # BLD AUTO: 124 X10*3/UL (ref 150–450)
RBC # BLD AUTO: 2.97 X10*6/UL (ref 4–5.2)
WBC # BLD AUTO: 16.8 X10*3/UL (ref 4.4–11.3)

## 2023-11-13 PROCEDURE — 36415 COLL VENOUS BLD VENIPUNCTURE: CPT

## 2023-11-13 PROCEDURE — 96372 THER/PROPH/DIAG INJ SC/IM: CPT

## 2023-11-13 PROCEDURE — 1210000001 HC SEMI-PRIVATE ROOM DAILY

## 2023-11-13 PROCEDURE — 2500000004 HC RX 250 GENERAL PHARMACY W/ HCPCS (ALT 636 FOR OP/ED)

## 2023-11-13 PROCEDURE — 2500000001 HC RX 250 WO HCPCS SELF ADMINISTERED DRUGS (ALT 637 FOR MEDICARE OP)

## 2023-11-13 PROCEDURE — 2500000004 HC RX 250 GENERAL PHARMACY W/ HCPCS (ALT 636 FOR OP/ED): Performed by: NURSE PRACTITIONER

## 2023-11-13 PROCEDURE — 85027 COMPLETE CBC AUTOMATED: CPT

## 2023-11-13 RX ADMIN — OXYCODONE HYDROCHLORIDE 10 MG: 5 TABLET ORAL at 16:01

## 2023-11-13 RX ADMIN — IBUPROFEN 600 MG: 600 TABLET, FILM COATED ORAL at 21:41

## 2023-11-13 RX ADMIN — ENOXAPARIN SODIUM 60 MG: 100 INJECTION SUBCUTANEOUS at 17:51

## 2023-11-13 RX ADMIN — POLYETHYLENE GLYCOL 3350 17 G: 17 POWDER, FOR SOLUTION ORAL at 05:24

## 2023-11-13 RX ADMIN — HYDROMORPHONE HYDROCHLORIDE 0.2 MG: 1 INJECTION, SOLUTION INTRAMUSCULAR; INTRAVENOUS; SUBCUTANEOUS at 17:52

## 2023-11-13 RX ADMIN — OXYCODONE HYDROCHLORIDE 10 MG: 5 TABLET ORAL at 20:25

## 2023-11-13 RX ADMIN — KETOROLAC TROMETHAMINE 30 MG: 30 INJECTION, SOLUTION INTRAMUSCULAR; INTRAVENOUS at 04:02

## 2023-11-13 RX ADMIN — ACETAMINOPHEN 975 MG: 325 TABLET ORAL at 21:41

## 2023-11-13 RX ADMIN — ACETAMINOPHEN 975 MG: 325 TABLET ORAL at 15:27

## 2023-11-13 RX ADMIN — IBUPROFEN 600 MG: 600 TABLET, FILM COATED ORAL at 15:27

## 2023-11-13 RX ADMIN — IRON SUCROSE 300 MG: 20 INJECTION, SOLUTION INTRAVENOUS at 12:04

## 2023-11-13 RX ADMIN — ACETAMINOPHEN 975 MG: 325 TABLET ORAL at 09:10

## 2023-11-13 RX ADMIN — KETOROLAC TROMETHAMINE 30 MG: 30 INJECTION, SOLUTION INTRAMUSCULAR; INTRAVENOUS at 09:11

## 2023-11-13 RX ADMIN — ACETAMINOPHEN 975 MG: 325 TABLET ORAL at 04:03

## 2023-11-13 ASSESSMENT — PAIN SCALES - GENERAL
PAINLEVEL_OUTOF10: 9
PAINLEVEL_OUTOF10: 5 - MODERATE PAIN
PAINLEVEL_OUTOF10: 9
PAINLEVEL_OUTOF10: 6
PAINLEVEL_OUTOF10: 9
PAINLEVEL_OUTOF10: 5 - MODERATE PAIN

## 2023-11-13 ASSESSMENT — PAIN - FUNCTIONAL ASSESSMENT
PAIN_FUNCTIONAL_ASSESSMENT: 0-10

## 2023-11-13 ASSESSMENT — PAIN DESCRIPTION - DESCRIPTORS
DESCRIPTORS: CRAMPING
DESCRIPTORS: SHARP

## 2023-11-13 ASSESSMENT — PAIN DESCRIPTION - LOCATION
LOCATION: ABDOMEN
LOCATION: ABDOMEN

## 2023-11-13 NOTE — PROGRESS NOTES
"Rita Mckeon is a 32 y.o. female  who is POD1 from a primary  at 39w2d for arrest of dilation.    Subjective   Reporting increased abdominal pain, still well controlled on pain regimen. Ambulating, voiding, tolerating PO, and passing gas. No BM. Denies any breast of breastfeeding issues.    denies dizziness/lightheadedness/LOC/uncontrolled bleeding- passed large clot overnight, lochia otherwise moderate  Denies HA, N/V, RUQ pain, vision changes.     Objective     Physical Exam  Vitals reviewed.   HENT:      Head: Normocephalic and atraumatic.      Mouth/Throat:      Mouth: Mucous membranes are moist.   Eyes:      Extraocular Movements: Extraocular movements intact.      Pupils: Pupils are equal, round, and reactive to light.   Cardiovascular:      Rate and Rhythm: Normal rate and regular rhythm.   Pulmonary:      Effort: Pulmonary effort is normal.   Abdominal:      Palpations: Abdomen is soft.      Tenderness: There is abdominal tenderness.      Comments: Dressing removed. Incision clean, dry, and intact. No erythema or drainage. Fundal check deferred due to patient intolerance.   Musculoskeletal:         General: Normal range of motion.   Skin:     General: Skin is warm and dry.   Neurological:      General: No focal deficit present.      Mental Status: She is alert and oriented to person, place, and time.   Psychiatric:         Mood and Affect: Mood normal.         Last Recorded Vitals  Blood pressure 97/65, pulse 101, temperature 36.3 °C (97.3 °F), temperature source Temporal, resp. rate 16, height 1.676 m (5' 6\"), weight 116 kg (255 lb 11.7 oz), last menstrual period 02/10/2023, SpO2 97 %, currently breastfeeding.  Intake/Output last 3 Shifts:  I/O last 3 completed shifts:  In: 4508.6 (38.9 mL/kg) [I.V.:3874.4 (33.4 mL/kg); IV Piggyback:500]  Out: 5741 (49.5 mL/kg) [Urine:3645 (0.9 mL/kg/hr); Blood:2096]  Weight: 116 kg           Assessment/Plan   Active Problems:    Chronic " hypertension    32 y.o. female  who is POD1 from a primary  at 39w2d for arrest of dilation.    #POD1 from pLTCS  - continue routine postoperative care  - pain moderately well controlled, transition to PO meds @ 24hr per ERAS protocol  - Hg 12.1 on admission -> 8.8 (11/12 PM) -> 8.0 (11/13 AM) -> for IV Fe  - dvt risk score: 5, for lovenox ppx    #Acute blood loss anemia  - Hg see above  - asymptomatic, VSS; Start 300mg IV iron sucrose daily while inpatient, max 3 doses  - continue to monitor for s/sx anemia   - POD2 repeat CBC to trend    #cHTN  - BP WNL on no meds  - asymptomatic  - BP cuff for home  - baseline HELLP labs negative  - will continue to monitor    #Maternal Well-Being  - emotional support provided  - bonding well with infant    #[place] Feeding  - breastfeeding/pumping encouraged; lactation consult prn     #Dispo  - anticipate d/c on POD #3 if meeting all post-op and postpartum milestones  - for f/u 2wks with Primary OB provider       Seen and discussed w/ NETTIE Morris-CNP  Elisa Sheppard MD PGY-1  Obstetrics & Gynecology

## 2023-11-13 NOTE — LACTATION NOTE
Lactation Consultant Note  Lactation Consultation  Reason for Consult: Initial assessment, Other (Comment) (supplementing and pumping)  Consultant Name: Mckayla Gutierrez RN, IBCLC    Maternal Information  Has mother  before?: Yes  How long did the mother previously breastfeed?: about a month  Previous Maternal Breastfeeding Challenges: Other (Comment) (did not specify)  Infant to breast within first 2 hours of birth?: Yes  Exclusive Pump and Bottle Feed: No (but, is also pumping and supplementing as well)    Maternal Assessment  Breast Assessment: Large, Symmetrical  Nipple Assessment: Intact, Erect  [place] Assessment: Normal    Infant Assessment  Infant Behavior: Deep sleep    Feeding Assessment  Nutrition Source: Formula (per mother’s request), Breastmilk  Feeding Method: Nursing at the breast, Paced bottle, Feeding expressed breastmilk  Unable to assess infant feeding at this time: Other (Comment) (mother is choosing to pump at this time and feed the baby via bottle with pumped breast milk and supplement, if needed)    LATCH TOOL       Breast Pump  Pump: Hospital grade electric pump, Massage, Double breast pumping  Frequency: Less than 3 times per day (just started to pump when she chose to start giving formula)  Duration: Initiate phase  Breast Shield Size and Type: 24 mm  Volume of Milk Production:  (last pumping session she obtianed 10 ML)  Units of Volume: mL per session    Other OB Lactation Tools       Patient Follow-up  Inpatient Lactation Follow-up Needed : Yes  Outpatient Lactation Follow-up: Recommended (Outpatient lactation resources reviewed and encouraged follow up as needed)    Other OB Lactation Documentation  Maternal Risk Factors: Hypertension,  delivery, Obesity (pre-pregnancy BMI >30), Other (comment) (in hx- reads breast lump- I did not palpate the breasts at this time)  Infant Risk Factors: High birth weight >3600 g, Other (comment) (mom supplementing with  formula)    Recommendations/Summary  I did not view a latch at this time.   Baby is around 17 hours of life at time of visit.     Mom stated she did breast feed another child for about a month and her plans are to breast feed this baby longer.     Mom cleaning her pump parts at this time with plans to pump at this time.   She stated she had been latching the baby to the breast and denies any pain with the latch but, the baby was cluster feeding and mom decided to supplement with formula and pump.   Reviewed the normalcy of cluster feeding at night and the natural rhythm of babies feeding in the first 24 hours of life.   Mom stated she did pump prior to my visit and obtained 10 ML of pumped breast milk that she gave to the baby via bottle nipple. Reviewed expectations of milk production in the first few days of life.    Reviewed feeding cues, the normal feeding patterns in the first 24 hours of life, the role of colostrum, output on different days of life, milk production/ supply in the first few days of life, and the benefits of skin to   skin.      Encouraged mom to breastfeed on demand with a goal of 8-12 feeds in a 24 hour period. If baby is not showing feeding cues and it has been 3 hours since the last time the baby was fed or the last time the baby tired to feed, encouraged her to place baby in skin to skin.  If she chose to supplement with formula, encouraged her to pump for 15 minutes on both breasts and to give the pumped breast milk prior to any formula supplement.   Mom denied needing any assistance with the pump.   Encouraged her to call for assistance with pumping or latching.     Encouraged her to utilize the outpatient lactation resources, if needed.   Contact information given.   [phone] [facility] or [phone] [place]      She has a breast pump for at home.     Denies any questions or concerns at this time.

## 2023-11-13 NOTE — DISCHARGE INSTRUCTIONS

## 2023-11-13 NOTE — PROGRESS NOTES
Rita Mckeon is a 32 y.o., , who had a , Low Transverse  delivery on 2023  at 39w2d and is now POD1.    She had an epidural without immediate complications noted.       Pain well controlled    Vitals:    23 0415   BP: 97/60   Pulse: 76   Resp: 16   Temp: 36.6 °C (97.9 °F)   SpO2: 97%       Neuraxial site assessed. No visible redness or swelling or drainage. Patient able to ambulate and move all extremities without difficulty. Able to void. No complaints of nausea/vomiting. Tolerating PO intake well. No s/sx of PDPH.     Anesthesia will sign off     Em Robles, CAA

## 2023-11-13 NOTE — SIGNIFICANT EVENT
Patient meets criteria for home monitoring of blood pressure post discharge.  Met with patient to assess for availability of home BP monitor.  Patient stated she uses her landlord's home BP monitor.She states she is going to buy one, and did not want a script. Patient educated on importance of continuing to monitor BP at home, recording BP on home monitoring log and s/sx of when to call her provider.  Pt verbalized understanding the above information.

## 2023-11-14 PROBLEM — Z34.83 PRENATAL CARE, SUBSEQUENT PREGNANCY IN THIRD TRIMESTER (HHS-HCC): Status: RESOLVED | Noted: 2023-10-12 | Resolved: 2023-11-14

## 2023-11-14 PROBLEM — O99.810 ABNORMAL GLUCOSE TOLERANCE IN PREGNANCY (HHS-HCC): Status: RESOLVED | Noted: 2023-10-12 | Resolved: 2023-11-14

## 2023-11-14 PROBLEM — D62 ACUTE BLOOD LOSS ANEMIA: Status: ACTIVE | Noted: 2023-11-14

## 2023-11-14 LAB
ERYTHROCYTE [DISTWIDTH] IN BLOOD BY AUTOMATED COUNT: 14.6 % (ref 11.5–14.5)
ERYTHROCYTE [DISTWIDTH] IN BLOOD BY AUTOMATED COUNT: 14.8 % (ref 11.5–14.5)
HCT VFR BLD AUTO: 22.7 % (ref 36–46)
HCT VFR BLD AUTO: 23 % (ref 36–46)
HGB BLD-MCNC: 7 G/DL (ref 12–16)
HGB BLD-MCNC: 7.1 G/DL (ref 12–16)
MCH RBC QN AUTO: 26.6 PG (ref 26–34)
MCH RBC QN AUTO: 27 PG (ref 26–34)
MCHC RBC AUTO-ENTMCNC: 30.4 G/DL (ref 32–36)
MCHC RBC AUTO-ENTMCNC: 31.3 G/DL (ref 32–36)
MCV RBC AUTO: 86 FL (ref 80–100)
MCV RBC AUTO: 88 FL (ref 80–100)
NRBC BLD-RTO: 0.2 /100 WBCS (ref 0–0)
NRBC BLD-RTO: 0.2 /100 WBCS (ref 0–0)
PLATELET # BLD AUTO: 133 X10*3/UL (ref 150–450)
PLATELET # BLD AUTO: 144 X10*3/UL (ref 150–450)
RBC # BLD AUTO: 2.63 X10*6/UL (ref 4–5.2)
RBC # BLD AUTO: 2.63 X10*6/UL (ref 4–5.2)
WBC # BLD AUTO: 12.9 X10*3/UL (ref 4.4–11.3)
WBC # BLD AUTO: 13.2 X10*3/UL (ref 4.4–11.3)

## 2023-11-14 PROCEDURE — 2500000004 HC RX 250 GENERAL PHARMACY W/ HCPCS (ALT 636 FOR OP/ED): Performed by: NURSE PRACTITIONER

## 2023-11-14 PROCEDURE — 85027 COMPLETE CBC AUTOMATED: CPT | Performed by: NURSE PRACTITIONER

## 2023-11-14 PROCEDURE — 2500000001 HC RX 250 WO HCPCS SELF ADMINISTERED DRUGS (ALT 637 FOR MEDICARE OP)

## 2023-11-14 PROCEDURE — 2500000004 HC RX 250 GENERAL PHARMACY W/ HCPCS (ALT 636 FOR OP/ED)

## 2023-11-14 PROCEDURE — 36415 COLL VENOUS BLD VENIPUNCTURE: CPT | Performed by: NURSE PRACTITIONER

## 2023-11-14 PROCEDURE — 1210000001 HC SEMI-PRIVATE ROOM DAILY

## 2023-11-14 PROCEDURE — 96372 THER/PROPH/DIAG INJ SC/IM: CPT

## 2023-11-14 RX ORDER — POLYETHYLENE GLYCOL 3350 17 G/17G
17 POWDER, FOR SOLUTION ORAL 2 TIMES DAILY
Status: DISCONTINUED | OUTPATIENT
Start: 2023-11-14 | End: 2023-11-15 | Stop reason: HOSPADM

## 2023-11-14 RX ADMIN — OXYCODONE HYDROCHLORIDE 10 MG: 5 TABLET ORAL at 18:00

## 2023-11-14 RX ADMIN — ACETAMINOPHEN 975 MG: 325 TABLET ORAL at 03:41

## 2023-11-14 RX ADMIN — OXYCODONE HYDROCHLORIDE 10 MG: 5 TABLET ORAL at 23:16

## 2023-11-14 RX ADMIN — ACETAMINOPHEN 975 MG: 325 TABLET ORAL at 15:17

## 2023-11-14 RX ADMIN — IBUPROFEN 600 MG: 600 TABLET, FILM COATED ORAL at 03:42

## 2023-11-14 RX ADMIN — IRON SUCROSE 300 MG: 20 INJECTION, SOLUTION INTRAVENOUS at 15:18

## 2023-11-14 RX ADMIN — IBUPROFEN 600 MG: 600 TABLET, FILM COATED ORAL at 21:54

## 2023-11-14 RX ADMIN — OXYCODONE HYDROCHLORIDE 10 MG: 5 TABLET ORAL at 11:16

## 2023-11-14 RX ADMIN — OXYCODONE HYDROCHLORIDE 10 MG: 5 TABLET ORAL at 04:25

## 2023-11-14 RX ADMIN — ACETAMINOPHEN 975 MG: 325 TABLET ORAL at 21:53

## 2023-11-14 RX ADMIN — IBUPROFEN 600 MG: 600 TABLET, FILM COATED ORAL at 15:26

## 2023-11-14 RX ADMIN — IBUPROFEN 600 MG: 600 TABLET, FILM COATED ORAL at 10:09

## 2023-11-14 RX ADMIN — ENOXAPARIN SODIUM 60 MG: 100 INJECTION SUBCUTANEOUS at 17:53

## 2023-11-14 RX ADMIN — POLYETHYLENE GLYCOL 3350 17 G: 17 POWDER, FOR SOLUTION ORAL at 20:40

## 2023-11-14 RX ADMIN — ACETAMINOPHEN 975 MG: 325 TABLET ORAL at 10:08

## 2023-11-14 ASSESSMENT — PAIN DESCRIPTION - DESCRIPTORS
DESCRIPTORS: CRAMPING
DESCRIPTORS: CRAMPING
DESCRIPTORS: SHARP
DESCRIPTORS: CRAMPING
DESCRIPTORS: ACHING;CRAMPING
DESCRIPTORS: ACHING

## 2023-11-14 ASSESSMENT — PAIN - FUNCTIONAL ASSESSMENT
PAIN_FUNCTIONAL_ASSESSMENT: 0-10

## 2023-11-14 ASSESSMENT — PAIN SCALES - GENERAL
PAINLEVEL_OUTOF10: 9
PAINLEVEL_OUTOF10: 6
PAINLEVEL_OUTOF10: 7
PAINLEVEL_OUTOF10: 9
PAINLEVEL_OUTOF10: 6
PAINLEVEL_OUTOF10: 6
PAINLEVEL_OUTOF10: 5 - MODERATE PAIN
PAINLEVEL_OUTOF10: 8
PAINLEVEL_OUTOF10: 6
PAINLEVEL_OUTOF10: 6
PAINLEVEL_OUTOF10: 7
PAINLEVEL_OUTOF10: 6
PAINLEVEL_OUTOF10: 5 - MODERATE PAIN
PAINLEVEL_OUTOF10: 6
PAINLEVEL_OUTOF10: 6

## 2023-11-14 ASSESSMENT — PAIN DESCRIPTION - LOCATION: LOCATION: ABDOMEN

## 2023-11-14 NOTE — LACTATION NOTE
Lactation Consultant Note  Lactation Consultation  Reason for Consult: Follow-up assessment, Other (Comment) (latching, supplementing, and pumping)  Consultant Name: Mckayla Gutierrez RN, IBCLC    Maternal Information       Maternal Assessment       Infant Assessment       Feeding Assessment  Unable to assess infant feeding at this time: Other (Comment) (mom feeding pumped breast milk via bottle nipple at this time)    LATCH TOOL       Breast Pump  Pump: Hospital grade electric pump, Double breast pumping  Frequency: 5-7 times per day  Duration: 15-20 minutes per session  Volume of Milk Production:  (~10 ML+)  Units of Volume: mL per session    Other OB Lactation Tools       Patient Follow-up  Inpatient Lactation Follow-up Needed : Yes    Other OB Lactation Documentation  Maternal Risk Factors: Hypertension,  delivery, Obesity (pre-pregnancy BMI >30)  Infant Risk Factors: High birth weight >3600 g, Other (comment) (supplementing with formula, pumping)    Recommendations/Summary  I did not view a latch at th is time.   Mom stated she has pumped breast milk she is giving the baby via bottle at this time.     Mom stated she is latching at times, she is supplementing with her own pumped breast milk via bottle and giving formula at times.     She denies any questions or concerns at this time.   Encouraged mom to call for assistance, if needed.     Encouraged mom to breastfeed on demand with a goal of 8-12 feeds in a 24 hour period. If baby is not showing feeding cues and it has been 3 hours since the last time the baby was fed or the last time the baby tired to feed, encouraged her to place baby in skin to skin.    If she chooses to supplement the baby (either with her own pumped breast milk - from a previous pumping session or formula) encouraged her to pump for 15 minutes on both breasts.   Anything she obtains can be given to the baby.     Denies any questions or concerns at this time.

## 2023-11-14 NOTE — PROGRESS NOTES
Postpartum Progress Note    Assessment/Plan     Rita Mckeon is a 32 y.o., , who had a , Low Transverse  delivery on 2023  at 39w2d and is now POD2.    #Acute blood loss anemia  - Hg  12.1  --> EBL 2096 -> 8.8 -> 8 -> 7  - asymptomatic, VSS; cont 300mg IV iron sucrose daily while inpatient, max 3 doses  - using shared decision making, Recheck CBC in PM; pt accepts blood transfusion if Hg >7 w/ symptoms OR Hg < 7 w/o symptoms.   - continue to monitor for s/sx anemia     #cHTN  - BP WNL on no meds  - asymptomatic  - BP cuff for home  - baseline HELLP labs negative  - will continue to monitor    #Post-op , Low Transverse   - continue routine postoperative care  - pain well controlled on ERAS protocol  - DVT Score: 5 SCDs and enoxaparin prophylactic dose daily while inpatient  - Increased gas pain/abdominal distension; encouraged ambulation and PO intake as tolerated, chewing gum, and simethicone/Milk of Magnesia and/or laxative.   The patient's blood type is O POS. The baby's blood type is B POS . Rhogam is not indicated.    #Contraception  Interval tubal ligation and Declines bridge method    #Maternal Well-Being  - emotional support provided  - bonding with infant    #[place] Feeding  - breastfeeding/pumping encouraged; lactation consult prn     #Dispo  - Anticipate DC PPD#3 pending BP control.  - The signs and symptoms of PEC were reviewed with the patient, including unrelenting headache, vision changes/blurred vision, and RUQ pain  - BP cuff for home for checking BP twice a day  - Pt instructed to call primary OB if SBP > 160 or DBP > 110 or if development of PEC symptoms   - On discharge, follow up with primary OB in:       - 2-5 days for BP check       - 2 weeks for incision check       - 4-6 week for post-partum visit     Principal Problem:     delivery delivered  Active Problems:    Acute blood loss anemia    Chronic hypertension      Subjective   Her pain is well  "controlled with current medications  She is passing flatus  She is ambulating independetly  She is tolerating a Adult diet Regular  She reports no breast or nursing problems  She denies emotional concerns today     Pt states she is making herself walk to bathroom q3hrs so she stays active. Endorses feeling bloated and like her stomach is \"heavy.\" Tried abd binder but it hurt. Is passing flatus but no BM yet. Took miralax. Tolerating PO    Objective   Last Vitals:  Temp Pulse Resp BP MAP Pulse Ox   36.6 °C (97.9 °F) 92 18 108/63   98 %     Vitals Min/Max Last 24 Hours:  Temp  Min: 36.3 °C (97.3 °F)  Max: 36.8 °C (98.2 °F)  Pulse  Min: 87  Max: 101  Resp  Min: 17  Max: 20  BP  Min: 100/63  Max: 112/68    Intake/Output:   No intake or output data in the 24 hours ending 11/14/23 1521    Physical Exam:  General: well appearing, obese, postpartum  Obstetric: fundus firm below umbilicus, lochia light  Skin: Warm, dry; no rashes/lesions/erythema; Pfannenstiel incision: clean, dry, well approximated, open to air, negative discharge/warmth/erythema   Breast: No masses, nipple discharge  Neuro: A/Ox3, conversational, no gross motor deficit   GI: mild distension, appropriately tender, soft, +BS  Respiratory: Even and unlabored on RA   Cardiovascular: Trace pedal edema; No erythema, warmth  Psych: appropriate mood and affect    Lab Data:  Lab Results   Component Value Date    WBC 13.2 (H) 11/14/2023    HGB 7.0 (L) 11/14/2023    HCT 23.0 (L) 11/14/2023     (L) 11/14/2023      "

## 2023-11-15 ENCOUNTER — PHARMACY VISIT (OUTPATIENT)
Dept: PHARMACY | Facility: CLINIC | Age: 33
End: 2023-11-15
Payer: MEDICAID

## 2023-11-15 VITALS
SYSTOLIC BLOOD PRESSURE: 128 MMHG | BODY MASS INDEX: 41.1 KG/M2 | HEART RATE: 79 BPM | TEMPERATURE: 99 F | RESPIRATION RATE: 20 BRPM | WEIGHT: 255.73 LBS | HEIGHT: 66 IN | DIASTOLIC BLOOD PRESSURE: 81 MMHG | OXYGEN SATURATION: 96 %

## 2023-11-15 PROCEDURE — 2500000001 HC RX 250 WO HCPCS SELF ADMINISTERED DRUGS (ALT 637 FOR MEDICARE OP)

## 2023-11-15 PROCEDURE — RXMED WILLOW AMBULATORY MEDICATION CHARGE

## 2023-11-15 PROCEDURE — 2500000004 HC RX 250 GENERAL PHARMACY W/ HCPCS (ALT 636 FOR OP/ED): Performed by: NURSE PRACTITIONER

## 2023-11-15 RX ORDER — OXYCODONE HYDROCHLORIDE 5 MG/1
5 TABLET ORAL EVERY 6 HOURS PRN
Qty: 8 TABLET | Refills: 0 | Status: SHIPPED | OUTPATIENT
Start: 2023-11-15 | End: 2023-11-20 | Stop reason: HOSPADM

## 2023-11-15 RX ORDER — IBUPROFEN 600 MG/1
600 TABLET ORAL EVERY 6 HOURS
Qty: 120 TABLET | Refills: 0 | Status: SHIPPED | OUTPATIENT
Start: 2023-11-15 | End: 2023-12-15

## 2023-11-15 RX ORDER — ACETAMINOPHEN 325 MG/1
975 TABLET ORAL EVERY 6 HOURS
Qty: 360 TABLET | Refills: 0 | Status: SHIPPED | OUTPATIENT
Start: 2023-11-15 | End: 2024-03-28

## 2023-11-15 RX ORDER — POLYETHYLENE GLYCOL 3350 17 G/17G
17 POWDER, FOR SOLUTION ORAL 2 TIMES DAILY
Qty: 60 PACKET | Refills: 0 | Status: SHIPPED | OUTPATIENT
Start: 2023-11-15 | End: 2023-12-15

## 2023-11-15 RX ADMIN — ACETAMINOPHEN 975 MG: 325 TABLET ORAL at 03:56

## 2023-11-15 RX ADMIN — ACETAMINOPHEN 975 MG: 325 TABLET ORAL at 09:46

## 2023-11-15 RX ADMIN — IBUPROFEN 600 MG: 600 TABLET, FILM COATED ORAL at 09:46

## 2023-11-15 RX ADMIN — OXYCODONE HYDROCHLORIDE 10 MG: 5 TABLET ORAL at 12:18

## 2023-11-15 RX ADMIN — POLYETHYLENE GLYCOL 3350 17 G: 17 POWDER, FOR SOLUTION ORAL at 09:46

## 2023-11-15 RX ADMIN — IBUPROFEN 600 MG: 600 TABLET, FILM COATED ORAL at 03:56

## 2023-11-15 ASSESSMENT — PAIN SCALES - GENERAL
PAINLEVEL_OUTOF10: 9
PAINLEVEL_OUTOF10: 0 - NO PAIN
PAINLEVEL_OUTOF10: 5 - MODERATE PAIN
PAINLEVEL_OUTOF10: 5 - MODERATE PAIN

## 2023-11-15 NOTE — DISCHARGE SUMMARY
Discharge Summary    Admission Date: 2023  Discharge Date: 11/15/23     Discharge Diagnosis  Problem List Items Addressed This Visit       Obesity, Class II, BMI 35-39.9    Overview       BMI 36 @ NOB visit  Growth US at 30 and 36 weeks   BPP or NST 37 wks to delivery     10/23 US EFW 62%tile AC 93%tile          ASCUS with positive high risk HPV cervical    Overview       Pap smear of cervix with ASCUS, cannot exclude HGSIL  OB COLPO 10/2 w/ Dr. Rowley; needs pap PP         RESOLVED: Abnormal glucose tolerance in pregnancy    Overview       Normal 3 hr gtt         Chronic hypertension    Overview       No meds         * (Principal)  delivery delivered - Primary    Relevant Medications    acetaminophen (Tylenol) 325 mg tablet    ibuprofen 600 mg tablet    polyethylene glycol (Glycolax, Miralax) 17 gram packet     Other Visit Diagnoses       Encounter for supervision of other normal pregnancy in third trimester        Relevant Orders    Labor Induction (Completed)             Hospital Course  Admitted for term IOL  Delivery Date: 2023  2:57 PM   Delivery type: , Low Transverse    GA at delivery: 39w2d  Outcome: Living   Anesthesia during delivery: Epidural   Intrapartum complications: Failure to Progress in First Stage   Feeding method: Breastfeeding Status: Yes     Delivery complications: EBL 2L from small bleeders  Contraception at discharge: none, interval tubal ligation, declined bridge method    Pertinent Subjective and Physical Exam At Time of Discharge  Patient seen at bedside - doing well and no acute events overnight. Pain well-controlled on current regimen, lochia light, voiding spontaneously, passing flatus, ambulating independently, and tolerating PO.     General: well appearing, obese, postpartum  Obstetric: fundus firm below umbilicus, lochia light  Skin: Warm, dry; no rashes/lesions/erythema; Pfannenstiel incision: clean, dry, well approximated, open to air, negative  discharge/warmth/erythema   Breast: No masses, nipple discharge  Neuro: A/Ox3, conversational, no gross motor deficit   GI: mild distension, appropriately tender, soft, +BS  Respiratory: Even and unlabored on RA   Cardiovascular: Trace pedal edema; No erythema, warmth  Psych: appropriate mood and affect      Complications Requiring Follow-Up  cHTN - not on meds    Discharge Meds     Your medication list        START taking these medications        Instructions Last Dose Given Next Dose Due   acetaminophen 325 mg tablet  Commonly known as: Tylenol      Take 3 tablets (975 mg) by mouth every 6 hours.       ibuprofen 600 mg tablet      Take 1 tablet (600 mg) by mouth every 6 hours.       polyethylene glycol 17 gram packet  Commonly known as: Glycolax, Miralax      Take 17 g (mix 1 packet) by mouth 2 times a day.              CONTINUE taking these medications        Instructions Last Dose Given Next Dose Due   loratadine 10 mg tablet  Commonly known as: Claritin      TAKE 1 TABLET BY MOUTH EVERYDAY AT BEDTIME              STOP taking these medications      Prenatal Vitamin Plus Low Iron 27 mg iron- 1 mg tablet  Generic drug: prenatal vitamin calcium-iron-folic                  Where to Get Your Medications        These medications were sent to [facility]  [address] [place] [address]      Hours: 8:30 AM to 5 PM Mon-Fri Phone: [phone]   acetaminophen 325 mg tablet  ibuprofen 600 mg tablet  polyethylene glycol 17 gram packet          Test Results Pending At Discharge  Pending Labs       Order Current Status    Surgical Pathology Exam - [facility] In process            Outpatient Follow-Up  Future Appointments   Date Time Provider Department Center   1/16/2024  2:30 PM Harsha Chavez OD [id number]      order placed to schedule 2-5 days for BP check with OB RN.    Seen and discussed w/ EVELIO Calderon MD PGY-1  Obstetrics & Gynecology

## 2023-11-15 NOTE — HOSPITAL COURSE
Rita Mckeon is a 32 y.o., , who had a , Low Transverse  delivery on 2023  at 39w2d. Her delivery was uncomplicated, see the operative report for full details. By POD3 she was meeting all postoperative milestones including: tolerating PO diet and medications, voiding, ambulating. Her pain was well controlled with PO pain medications. She was appropriate for discharge home and will follow up as an outpatient in 1 week for a BP check.    PE  General: well appearing, obese, postpartum  Obstetric: fundus firm below umbilicus, lochia light  Skin: Warm, dry; no rashes/lesions/erythema; Pfannenstiel incision: clean, dry, well approximated, open to air, negative discharge/warmth/erythema   Breast: No masses, nipple discharge  Neuro: A/Ox3, conversational, no gross motor deficit   GI: mild distension, appropriately tender, soft, +BS  Respiratory: Even and unlabored on RA   Cardiovascular: Trace pedal edema; No erythema, warmth  Psych: appropriate mood and affect      HPI from Admission H&P  Rita Mckeon is a 32 y.o.  at 39w1d admitted for term IOL. Endorses good FM. Denies VB and LOF   Due Date: 2023, by Last Menstrual Period  Prenatal Care with Julisa Cui CNM      Pregnancy notables:  Medical Problems         Problem List                     Obesity, Class II, BMI 35-39.9     Overview Signed 10/14/2023  2:50 PM by MEE Becerra          BMI 36 @ NOB visit  Growth US at 30 and 36 weeks   BPP or NST 37 wks to delivery   36 wga US EFW 62%tile AC 93%tile           Hx of ASCUS with positive high risk HPV cervical     Overview Addendum 10/14/2023  2:49 PM by MEE Becerra           Pap smear of cervix with ASCUS, cannot exclude HGSIL  OB COLPO 10/2 w/ Dr. Rowley;   needs pap PP           Abnormal glucose tolerance in pregnancy     Overview Addendum 10/14/2023  2:49 PM by MEE Becerra       Abnormal 1 hour   Normal  3 hr gtt           Chronic hypertension affecting pregnancy     Overview Addendum 10/14/2023  2:47 PM by Ree Felton, APRN-CNM          No meds                        Obstetrical History                    OB History    Para Term  AB Living   4 2 2 0 1 2   SAB IAB Ectopic Multiple Live Births      0 1 0 0 2          # Outcome Date GA Lbr Jared/2nd Weight Sex Delivery Anes PTL Lv   4 Current                     3 Term 16 40w0d   3657 g F Vag-Spont   N     2 Term 11 40w0d   3657 g M Vag-Spont EPI N     1 IAB                          Past Medical History  Medical History        Past Medical History:   Diagnosis Date    Abnormal Pap smear of cervix      Eczema      Hypertension      Obesity in pregnancy              Past Surgical History   Surgical History   No past surgical history on file.        Social History  Denies abuse  Denies T/E/D     Allergies  Patient has no known allergies.     Medications     Current Facility-Administered Medications:     carboprost (Hemabate) injection 250 mcg, 250 mcg, intramuscular, Once PRN, She Pradhan, APRN-CNM    hydrALAZINE (Apresoline) injection 5 mg, 5 mg, intravenous, Once PRN, She B Carolynn, APRN-CNM    labetaloL (Normodyne,Trandate) injection 20 mg, 20 mg, intravenous, Once PRN, She Pradhan, APRN-CNM    lactated Ringer's bolus 500 mL, 500 mL, intravenous, Once PRN, She Pradhan, APRN-CNM    lactated Ringer's infusion, 125 mL/hr, intravenous, Continuous, She Pradhan, APRN-CNM, Last Rate: 125 mL/hr at 23 1500, 125 mL/hr at 23 1500    levonorgestreL (LILETTA) IUD 52 mg, 1 each, intrauterine, Once PRN, She B Pradhan, APRN-CNM    lidocaine (Xylocaine) 10 mg/mL (1 %) injection 30 mL, 30 mL, subcutaneous, Once PRN, She Pradhan, APRN-CNM    loperamide (Imodium) capsule 4 mg, 4 mg, oral, q2h PRN, She Pradhan, APRN-CNM    methylergonovine (Methergine) injection 0.2 mg, 0.2 mg, intramuscular, Once PRN, She B  RATNA PradhanN-CN    metoclopramide (Reglan) tablet 10 mg, 10 mg, oral, q6h PRN **OR** metoclopramide (Reglan) injection 10 mg, 10 mg, intravenous, q6h PRN, She Pradhan APRN-CN    miSOPROStoL (Cytotec) tablet 800 mcg, 800 mcg, rectal, Once PRN, She Pradhan APRN-LUCY    NIFEdipine (Procardia) capsule 10 mg, 10 mg, oral, Once PRN, She Pradhan, APRN-CN    ondansetron (Zofran) tablet 4 mg, 4 mg, oral, q6h PRN **OR** ondansetron (Zofran) injection 4 mg, 4 mg, intravenous, q6h PRN, NETTIE Butler-CN    oxytocin (Pitocin) bolus from bag, 600 homero-units/min, intravenous, Once PRN, She Pradhan, APRN-CN    oxytocin (Pitocin) bolus from bag, 600 homero-units/min, intravenous, Once PRN, She Pradhan, APRN-CN    oxytocin (Pitocin) infusion in sodium chloride 0.9% 30 units/500 mL, 60 homero-units/min, intravenous, Once PRN, NETTIE Butler-CNKIKE    oxytocin (Pitocin) injection 10 Units, 10 Units, intramuscular, Once PRN, She Pradhan, APRN-CN    oxytocin (Pitocin) injection 10 Units, 10 Units, intramuscular, Once PRN, She Pradhan APRN-CN    terbutaline (Brethine) injection 0.25 mg, 0.25 mg, subcutaneous, Once PRN, She Pradhan APRN-CN    tranexamic acid (Cyklokapron) injection 1,000 mg, 1,000 mg, intravenous, Once PRN, She Pradhan, NETTIE-LUCY

## 2023-11-15 NOTE — CARE PLAN
The patient's goals for the shift include healthy mom and baby    The clinical goals for the shift include safety      Problem: Pain - Adult  Goal: Verbalizes/displays adequate comfort level or baseline comfort level  Outcome: Progressing

## 2023-11-16 NOTE — SIGNIFICANT EVENT
Follow-up Phone Call Note:   Interview:  Care Type: Women's Health   Phone Number Call  [phone]   Call Outcome: Connected with patient   Patient Reports Feeling (symptoms) Are: better   Which Meds Were New Meds:  Yes   Which Meds to Continue:  Yes   Which Meds to Stop: no medications were discontinued   Who participated in medication reconciliation with the hospital staff?: you   In your professional opinion do you think there was a medication discrepancy or potential for medication discrepancy in this situation?: no   Medication Issues: no medication issues   Discharge Instructions Clear:   Yes   Patient Has a Primary Care Provider:     Yes   Post-hospitalization Follow-up Occurred According To Schedule:    No   Reason: appointment not scheduled, encouraged patient to call for an appointment    Delivered Baby(ies): Yes   Chest Pain:  No   Shortness of breath or difficulty breathing:  No   Seizures:  No   Any thoughts of hurting yourself or your baby:   No   Bleeding that is soaking through one pad/hour or blood clots the size of an egg or bigger:  No   Incision that is not healing:  No   Red or swollen leg that is painful or warm to the touch:  No   Temperature of 100.4F or higher:  No   Headache that does not get better, even after taking medicine, or a bad headache with vision changes:  No   Where or in what is your baby sleeping?: [place]   ABC's of sleep covered:yes   How Are You Feeding Your Baby(ies): breast   Baby Getting Anything Other Than Breastmilk Or Formula For Food:  No   Patient Has Primary Care Provider For Baby(ies): yes   Baby Has Been Seen By a Health Care Provider Since Discharge:  No   Which Health Care Provider Saw the Baby: physician office/clinic visit   Mom's Discharge Date: 11/15/23   Date the Baby Was Seen By a Health Care Provider After Discharge: Appt  on 11/17   Patient Has Plan Specific Name And Number Of Who To Call For Concerns:  Yes   Stroke Patient:  No  Comments:     Date/Time Of Call: 11/16/23 1232   Call Back Done By: care coordinator   Callback Complete:  Yes

## 2023-11-17 ENCOUNTER — PHARMACY VISIT (OUTPATIENT)
Dept: PHARMACY | Facility: CLINIC | Age: 33
End: 2023-11-17
Payer: MEDICAID

## 2023-11-17 LAB
LABORATORY COMMENT REPORT: NORMAL
PATH REPORT.FINAL DX SPEC: NORMAL
PATH REPORT.GROSS SPEC: NORMAL
PATH REPORT.RELEVANT HX SPEC: NORMAL
PATH REPORT.TOTAL CANCER: NORMAL

## 2023-11-20 ENCOUNTER — TELEPHONE (OUTPATIENT)
Dept: OBSTETRICS AND GYNECOLOGY | Facility: CLINIC | Age: 33
End: 2023-11-20
Payer: COMMERCIAL

## 2023-11-20 ENCOUNTER — PHARMACY VISIT (OUTPATIENT)
Dept: PHARMACY | Facility: CLINIC | Age: 33
End: 2023-11-20
Payer: MEDICAID

## 2023-11-20 ENCOUNTER — HOSPITAL ENCOUNTER (OUTPATIENT)
Facility: HOSPITAL | Age: 33
Discharge: HOME | End: 2023-11-20
Attending: OBSTETRICS & GYNECOLOGY | Admitting: OBSTETRICS & GYNECOLOGY
Payer: COMMERCIAL

## 2023-11-20 VITALS
SYSTOLIC BLOOD PRESSURE: 128 MMHG | HEART RATE: 90 BPM | OXYGEN SATURATION: 100 % | RESPIRATION RATE: 16 BRPM | DIASTOLIC BLOOD PRESSURE: 79 MMHG | TEMPERATURE: 98.2 F

## 2023-11-20 DIAGNOSIS — T81.49XA POSTOPERATIVE WOUND INFECTION: Primary | ICD-10-CM

## 2023-11-20 PROCEDURE — RXMED WILLOW AMBULATORY MEDICATION CHARGE

## 2023-11-20 PROCEDURE — 87185 SC STD ENZYME DETCJ PER NZM: CPT

## 2023-11-20 PROCEDURE — 99214 OFFICE O/P EST MOD 30 MIN: CPT | Performed by: OBSTETRICS & GYNECOLOGY

## 2023-11-20 PROCEDURE — 2500000004 HC RX 250 GENERAL PHARMACY W/ HCPCS (ALT 636 FOR OP/ED)

## 2023-11-20 RX ORDER — SULFAMETHOXAZOLE AND TRIMETHOPRIM 800; 160 MG/1; MG/1
160 TABLET ORAL ONCE
Status: COMPLETED | OUTPATIENT
Start: 2023-11-20 | End: 2023-11-20

## 2023-11-20 RX ORDER — LIDOCAINE HYDROCHLORIDE 20 MG/ML
10 INJECTION, SOLUTION INFILTRATION; PERINEURAL ONCE
Status: DISCONTINUED | OUTPATIENT
Start: 2023-11-20 | End: 2023-11-20

## 2023-11-20 RX ORDER — LIDOCAINE HYDROCHLORIDE 10 MG/ML
20 INJECTION INFILTRATION; PERINEURAL ONCE
Status: DISCONTINUED | OUTPATIENT
Start: 2023-11-20 | End: 2023-11-20 | Stop reason: HOSPADM

## 2023-11-20 RX ORDER — SULFAMETHOXAZOLE AND TRIMETHOPRIM 800; 160 MG/1; MG/1
1 TABLET ORAL EVERY 12 HOURS
Qty: 14 TABLET | Refills: 0 | Status: SHIPPED | OUTPATIENT
Start: 2023-11-20 | End: 2023-11-28

## 2023-11-20 RX ADMIN — SULFAMETHOXAZOLE AND TRIMETHOPRIM 160 MG: 800; 160 TABLET ORAL at 17:47

## 2023-11-20 SDOH — HEALTH STABILITY: MENTAL HEALTH: WISH TO BE DEAD (PAST 1 MONTH): NO

## 2023-11-20 SDOH — SOCIAL STABILITY: SOCIAL INSECURITY: DO YOU FEEL ANYONE HAS EXPLOITED OR TAKEN ADVANTAGE OF YOU FINANCIALLY OR OF YOUR PERSONAL PROPERTY?: NO

## 2023-11-20 SDOH — HEALTH STABILITY: MENTAL HEALTH: SUICIDAL BEHAVIOR (LIFETIME): NO

## 2023-11-20 SDOH — SOCIAL STABILITY: SOCIAL INSECURITY: DOES ANYONE TRY TO KEEP YOU FROM HAVING/CONTACTING OTHER FRIENDS OR DOING THINGS OUTSIDE YOUR HOME?: NO

## 2023-11-20 SDOH — HEALTH STABILITY: MENTAL HEALTH: HAVE YOU USED ANY PRESCRIPTION DRUGS OTHER THAN PRESCRIBED IN THE PAST 12 MONTHS?: NO

## 2023-11-20 SDOH — HEALTH STABILITY: MENTAL HEALTH: HAVE YOU USED ANY SUBSTANCES (CANABIS, COCAINE, HEROIN, HALLUCINOGENS, INHALANTS, ETC.) IN THE PAST 12 MONTHS?: NO

## 2023-11-20 SDOH — SOCIAL STABILITY: SOCIAL INSECURITY: ABUSE SCREEN: ADULT

## 2023-11-20 SDOH — HEALTH STABILITY: MENTAL HEALTH: WERE YOU ABLE TO COMPLETE ALL THE BEHAVIORAL HEALTH SCREENINGS?: YES

## 2023-11-20 SDOH — SOCIAL STABILITY: SOCIAL INSECURITY: PHYSICAL ABUSE: DENIES

## 2023-11-20 SDOH — SOCIAL STABILITY: SOCIAL INSECURITY: ARE YOU OR HAVE YOU BEEN THREATENED OR ABUSED PHYSICALLY, EMOTIONALLY, OR SEXUALLY BY ANYONE?: NO

## 2023-11-20 SDOH — SOCIAL STABILITY: SOCIAL INSECURITY: VERBAL ABUSE: DENIES

## 2023-11-20 SDOH — ECONOMIC STABILITY: HOUSING INSECURITY: DO YOU FEEL UNSAFE GOING BACK TO THE PLACE WHERE YOU ARE LIVING?: NO

## 2023-11-20 SDOH — SOCIAL STABILITY: SOCIAL INSECURITY: HAS ANYONE EVER THREATENED TO HURT YOUR FAMILY OR YOUR PETS?: NO

## 2023-11-20 SDOH — HEALTH STABILITY: MENTAL HEALTH: NON-SPECIFIC ACTIVE SUICIDAL THOUGHTS (PAST 1 MONTH): NO

## 2023-11-20 SDOH — SOCIAL STABILITY: SOCIAL INSECURITY: ARE THERE ANY APPARENT SIGNS OF INJURIES/BEHAVIORS THAT COULD BE RELATED TO ABUSE/NEGLECT?: NO

## 2023-11-20 SDOH — SOCIAL STABILITY: SOCIAL INSECURITY: HAVE YOU HAD THOUGHTS OF HARMING ANYONE ELSE?: NO

## 2023-11-20 ASSESSMENT — PATIENT HEALTH QUESTIONNAIRE - PHQ9
1. LITTLE INTEREST OR PLEASURE IN DOING THINGS: NOT AT ALL
2. FEELING DOWN, DEPRESSED OR HOPELESS: NOT AT ALL
SUM OF ALL RESPONSES TO PHQ9 QUESTIONS 1 & 2: 0

## 2023-11-20 ASSESSMENT — LIFESTYLE VARIABLES
HOW OFTEN DO YOU HAVE 6 OR MORE DRINKS ON ONE OCCASION: NEVER
SKIP TO QUESTIONS 9-10: 1
AUDIT-C TOTAL SCORE: 0
AUDIT-C TOTAL SCORE: 0
HOW OFTEN DO YOU HAVE A DRINK CONTAINING ALCOHOL: NEVER
HOW MANY STANDARD DRINKS CONTAINING ALCOHOL DO YOU HAVE ON A TYPICAL DAY: PATIENT DOES NOT DRINK

## 2023-11-20 ASSESSMENT — PAIN SCALES - GENERAL: PAINLEVEL: 0 - NO PAIN

## 2023-11-20 NOTE — H&P
Obstetrical Admission History and Physical    Subjective   Rita Mckeon is a 32 y.o.  who is POD #8 after PLTCS in s/o arrest of active phase of labor after IOL.  Pt reports that on 23 she was taking her daughter to a pediatrician apt and was lifting the car seat by herself when she felt a pop on the right side of her incision. Pt reports purulent drainage, an opening in the incision, but no pain. Pt denies fevers, chills, shortness of breath, abdominal pain, chest pain, diarrhea, nausea, or vomiting. Post partum course notable for postpartum hemorrhage to which she received IV iron.     Chief Complaint: Wound Check    Assessment/Plan      #post op wound infection     -Wound culture sent   -no evidence of fascial dehiscence on exam    - Wound was irrigated with NS and packed with wet packing strips      -Pt & family member instructed on dressing changes and given supplies    -DS bactrim first dose given today, Rx written     -Follow up appt scheduled with Dr. Parr on 23    -Pt offered inpatient observation for home wound care setup but declined    -Pt counseled on L&D  return precautions       Pt staffed and discussed with OBGYN attending Dr. aTmanna Puckett, DO   Emergency Medicine PGY-1    Active Problems:  There are no active Hospital Problems.      Pregnancy Problems (from 23 to present)       Problem Noted Resolved     delivery delivered 2023 by JOSSELYN Morris No    Priority:  Medium      Acute blood loss anemia 2023 by JOSSELYN Morris No    Priority:  Medium      Chronic hypertension 10/12/2023 by MEE Cheek APRN-CNP No    Priority:  Medium      Overview Addendum 10/14/2023  2:47 PM by MEE Becerra       No meds         39 weeks gestation of pregnancy 2023 by MEE Butler 2023 by JOSSELYN Morris    37 weeks gestation of pregnancy 10/26/2023 by Julisa Cui,  MEE, APRN-CNP 2023 by CAROLYN Butler    Abnormal glucose tolerance in pregnancy 10/12/2023 by MEE Cheek, APRN-CNP 2023 by NETTIE MorrisCNP    Overview Addendum 10/14/2023  2:49 PM by MEE Becerra       Normal 3 hr gtt         Prenatal care, subsequent pregnancy in third trimester 10/12/2023 by MEE Cheek, APRREMA-CNP 2023 by NETTIE MorrisCNP    34 weeks gestation of pregnancy 10/12/2023 by MEE Cheek, NETTIE-CNP 10/14/2023 by MEE Becerra               Obstetrical History   OB History    Para Term  AB Living   4 3 3 0 1 3   SAB IAB Ectopic Multiple Live Births   0 1 0 0 3      # Outcome Date GA Lbr Jared/2nd Weight Sex Delivery Anes PTL Lv   4 Term 23 39w2d  3700 g F CS-LTranv EPI  ZEINAB      Complications: Failure to Progress in First Stage, Arrested active phase of labor   3 Term 16 40w0d  3657 g F Vag-Spont  N    2 Term 11 40w0d  3657 g M Vag-Spont EPI N    1 IAB                Past Medical History  Past Medical History:   Diagnosis Date    Abnormal Pap smear of cervix     Chronic hypertension 10/12/2023       No meds    Eczema     Hypertension     Obesity in pregnancy         Past Surgical History   No past surgical history on file.    Social History  Social History     Tobacco Use    Smoking status: Former     Types: Cigars    Smokeless tobacco: Never   Substance Use Topics    Alcohol use: Not Currently     Substance and Sexual Activity   Drug Use Not Currently    Types: Marijuana       Allergies  Patient has no known allergies.     Medications  Medications Prior to Admission   Medication Sig Dispense Refill Last Dose    acetaminophen (Tylenol) 325 mg tablet Take 3 tablets (975 mg) by mouth every 6 hours. 360 tablet 0 2023    ibuprofen 600 mg tablet Take 1 tablet (600 mg) by mouth every 6 hours. 120 tablet 0 2023    loratadine (Claritin) 10 mg tablet  TAKE 1 TABLET BY MOUTH EVERYDAY AT BEDTIME 30 tablet 2     [] oxyCODONE (Roxicodone) 5 mg immediate release tablet Take 1 tablet (5 mg) by mouth every 6 hours if needed (Pain score 6-8) for up to 2 days. 8 tablet 0     polyethylene glycol (Glycolax, Miralax) 17 gram packet Take 17 g (mix 1 packet) by mouth 2 times a day. 60 packet 0 2023       Objective    Last Vitals  Temp Pulse Resp BP MAP O2 Sat   36.8 °C (98.2 °F) 67 16 136/82   100 %     Physical Examination  GENERAL: Examination reveals a well developed, well nourished, gravid female in no acute distress. She is alert and cooperative.  LUNGS: clear to auscultation bilaterally  HEART: regular rate and rhythm, S1, S2 normal, no murmur, click, rub or gallop  ABDOMEN: Soft, non tender, non distended, no guarding, and no rebound tenderness  SKIN: PLTCS incision with approximately 2-3 cm opening with fascial layer probed and intact  Malodorous purulent drainage present. Some surrounding induration but with no fluctuance. No evidence of cellulitis. Please see image in media tab     Lab Review  Labs in chart were reviewed.    Patient seen and examined by me, agree with the above assessment and plan.  Post op SSI, no evidence fascial dehiscence, offered patient a dose of IV antibiotics and admission to connect with wound team but patient has several family members in nursing care and we instructed her cousin how to irrigate and pack wound twice daily and patient has appointment on Wednesday for follow up with Dr Parr.  Reena Nolen MD

## 2023-11-20 NOTE — TELEPHONE ENCOUNTER
----- Message from Rita Mckeon sent at 11/18/2023  6:14 PM EST -----  Regarding: Your Recent Visit  Contact: [phone]  This is what it looks like it don’t have a bad smell just from sweat people I know who had c sections said it’s ok keep it dry but I need her opinion and to look at it please

## 2023-11-22 LAB
B-LACTAMASE ORGANISM ISLT: POSITIVE
BACTERIA SPEC CULT: ABNORMAL
BACTERIA SPEC CULT: ABNORMAL
GRAM STN SPEC: ABNORMAL
GRAM STN SPEC: ABNORMAL

## 2023-11-26 ENCOUNTER — PHARMACY VISIT (OUTPATIENT)
Dept: PHARMACY | Facility: CLINIC | Age: 33
End: 2023-11-26
Payer: MEDICAID

## 2023-11-26 PROCEDURE — RXMED WILLOW AMBULATORY MEDICATION CHARGE

## 2023-11-27 ENCOUNTER — CLINICAL SUPPORT (OUTPATIENT)
Dept: OBSTETRICS AND GYNECOLOGY | Facility: HOSPITAL | Age: 33
End: 2023-11-27
Payer: COMMERCIAL

## 2023-11-27 DIAGNOSIS — R23.9 ALTERATION IN SKIN INTEGRITY RELATED TO SURGICAL INCISION: ICD-10-CM

## 2023-11-27 DIAGNOSIS — I10 CHRONIC HYPERTENSION: ICD-10-CM

## 2023-11-27 DIAGNOSIS — D62 ACUTE BLOOD LOSS ANEMIA: ICD-10-CM

## 2023-11-27 PROCEDURE — 99211 OFF/OP EST MAY X REQ PHY/QHP: CPT | Performed by: STUDENT IN AN ORGANIZED HEALTH CARE EDUCATION/TRAINING PROGRAM

## 2023-11-27 NOTE — PROGRESS NOTES
Pt here for incision check  s/p  23  Pt has opening with packing on rt side of abdomen   In office JANE Villafuerte notified to access wound      Bita Zarco LPN      Narrating RN:    I was called to bed side by Bita Zarco LPN to assess wound.   The patient is s/p a low transverse  at 39.2wga on  now POD #15.  She reports that she is feeling well. She denies any pain, fever, chills, shortness of breath, abdominal pain, chest pain, nausea, or vomiting.   She is also breast feeding, endorses this is going well but reports concerns for milk supply after antibiotics course. She was seen in triage for post op wound check, there was no evidence of fascial dehiscence reported.  She is s/p one course of Bactrim, and reports that she has been changing dressing at home twice daily, packing with packing strips, and saline, covering with telfa and abdominal pad.    PE:  Abdominal: Soft, non tender to palpation, bowel sound present. Endorses ability to pass gas, move bowels, and urinate without difficulty.  Skin: Incision is open 5.7cm x 1.5cm x 2cm.     Dr. Trujillo called to bedside to assess, wound irrigated with saline, and packed with packing strips, covered with Telfa, and abdominal pad.    Plan of care is as follows:   Patient to continue current regimen of dressing changes at home twice daily.   Will return to office for incision check Tuesday before postpartum visit with Julisa.  Closely reviewed postpartum warning signs and signs/symptoms of infection.  All questions and concerns were addressed at the time of visit and patient was agreeable with plan of care.  Idania Holder BSN, RN, CLC

## 2023-11-28 ENCOUNTER — PHARMACY VISIT (OUTPATIENT)
Dept: PHARMACY | Facility: CLINIC | Age: 33
End: 2023-11-28

## 2023-11-28 PROCEDURE — RXOTC WILLOW AMBULATORY OTC CHARGE

## 2023-12-05 ENCOUNTER — APPOINTMENT (OUTPATIENT)
Dept: OBSTETRICS AND GYNECOLOGY | Facility: HOSPITAL | Age: 33
End: 2023-12-05
Payer: COMMERCIAL

## 2023-12-08 ENCOUNTER — PHARMACY VISIT (OUTPATIENT)
Dept: PHARMACY | Facility: CLINIC | Age: 33
End: 2023-12-08
Payer: MEDICAID

## 2023-12-08 ENCOUNTER — CLINICAL SUPPORT (OUTPATIENT)
Dept: OBSTETRICS AND GYNECOLOGY | Facility: HOSPITAL | Age: 33
End: 2023-12-08
Payer: COMMERCIAL

## 2023-12-08 VITALS
SYSTOLIC BLOOD PRESSURE: 141 MMHG | WEIGHT: 226 LBS | BODY MASS INDEX: 36.48 KG/M2 | DIASTOLIC BLOOD PRESSURE: 95 MMHG | HEART RATE: 84 BPM

## 2023-12-08 PROCEDURE — RXMED WILLOW AMBULATORY MEDICATION CHARGE

## 2023-12-08 PROCEDURE — 99211 OFF/OP EST MAY X REQ PHY/QHP: CPT | Performed by: OBSTETRICS & GYNECOLOGY

## 2023-12-08 NOTE — PROGRESS NOTES
Patient here  for incision check  /100 retake 145/95. Dr Bolden notified.   Incision assessed. Area measured 1 cm x 4 cm. Small amount of serosanguineous drainage noted with mild odor. Area cleansed with Normal saline and packed with Nu Gauze then covered with dry sterile dressing. Patient tolerated treatment.

## 2023-12-18 PROCEDURE — RXMED WILLOW AMBULATORY MEDICATION CHARGE

## 2023-12-21 ENCOUNTER — PHARMACY VISIT (OUTPATIENT)
Dept: PHARMACY | Facility: CLINIC | Age: 33
End: 2023-12-21
Payer: MEDICAID

## 2023-12-21 PROCEDURE — RXOTC WILLOW AMBULATORY OTC CHARGE

## 2024-01-05 ENCOUNTER — POSTPARTUM VISIT (OUTPATIENT)
Dept: OBSTETRICS AND GYNECOLOGY | Facility: HOSPITAL | Age: 34
End: 2024-01-05
Payer: COMMERCIAL

## 2024-01-05 VITALS
SYSTOLIC BLOOD PRESSURE: 134 MMHG | WEIGHT: 238 LBS | BODY MASS INDEX: 38.25 KG/M2 | DIASTOLIC BLOOD PRESSURE: 86 MMHG | HEIGHT: 66 IN

## 2024-01-05 DIAGNOSIS — Z30.42 DEPO-PROVERA CONTRACEPTIVE STATUS: ICD-10-CM

## 2024-01-05 DIAGNOSIS — R87.810 ASCUS WITH POSITIVE HIGH RISK HPV CERVICAL: ICD-10-CM

## 2024-01-05 DIAGNOSIS — Z86.19 HISTORY OF WOUND INFECTION: ICD-10-CM

## 2024-01-05 DIAGNOSIS — R87.610 ASCUS WITH POSITIVE HIGH RISK HPV CERVICAL: ICD-10-CM

## 2024-01-05 PROBLEM — D62 ACUTE BLOOD LOSS ANEMIA: Status: RESOLVED | Noted: 2023-11-14 | Resolved: 2024-01-05

## 2024-01-05 PROCEDURE — 0503F POSTPARTUM CARE VISIT: CPT | Performed by: ADVANCED PRACTICE MIDWIFE

## 2024-01-05 PROCEDURE — 88141 CYTOPATH C/V INTERPRET: CPT | Performed by: PATHOLOGY

## 2024-01-05 PROCEDURE — 2500000004 HC RX 250 GENERAL PHARMACY W/ HCPCS (ALT 636 FOR OP/ED): Performed by: ADVANCED PRACTICE MIDWIFE

## 2024-01-05 PROCEDURE — 88175 CYTOPATH C/V AUTO FLUID REDO: CPT | Mod: TC | Performed by: ADVANCED PRACTICE MIDWIFE

## 2024-01-05 PROCEDURE — 96372 THER/PROPH/DIAG INJ SC/IM: CPT | Performed by: ADVANCED PRACTICE MIDWIFE

## 2024-01-05 PROCEDURE — 87624 HPV HI-RISK TYP POOLED RSLT: CPT | Performed by: ADVANCED PRACTICE MIDWIFE

## 2024-01-05 RX ORDER — MEDROXYPROGESTERONE ACETATE 150 MG/ML
150 INJECTION, SUSPENSION INTRAMUSCULAR ONCE
Status: COMPLETED | OUTPATIENT
Start: 2024-01-05 | End: 2024-01-05

## 2024-01-05 RX ADMIN — MEDROXYPROGESTERONE ACETATE 150 MG: 150 INJECTION, SUSPENSION INTRAMUSCULAR at 15:00

## 2024-01-05 ASSESSMENT — EDINBURGH POSTNATAL DEPRESSION SCALE (EPDS)
I HAVE FELT SCARED OR PANICKY FOR NO GOOD REASON: NO, NOT AT ALL
I HAVE BEEN ABLE TO LAUGH AND SEE THE FUNNY SIDE OF THINGS: AS MUCH AS I ALWAYS COULD
I HAVE LOOKED FORWARD WITH ENJOYMENT TO THINGS: AS MUCH AS I EVER DID
I HAVE BEEN SO UNHAPPY THAT I HAVE BEEN CRYING: NO, NEVER
I HAVE FELT SAD OR MISERABLE: NO, NOT AT ALL
THINGS HAVE BEEN GETTING ON TOP OF ME: NO, I HAVE BEEN COPING AS WELL AS EVER
I HAVE BEEN SO UNHAPPY THAT I HAVE HAD DIFFICULTY SLEEPING: NOT AT ALL
I HAVE BEEN ANXIOUS OR WORRIED FOR NO GOOD REASON: NO, NOT AT ALL
TOTAL SCORE: 0
I HAVE BLAMED MYSELF UNNECESSARILY WHEN THINGS WENT WRONG: NO, NEVER
THE THOUGHT OF HARMING MYSELF HAS OCCURRED TO ME: NEVER

## 2024-01-05 NOTE — LETTER
Date: 2024  RE:  Rita Mckeon  :  1990      To Whom It May Concern:    Our patient, Rita, has been under our care and now may return back to work without restrictions.    Their return to work date is:  2024    If you have questions concerning this patient's immediate care, please feel free to contact our office at [phone].    Sincerely,    NETTIE Cheek-SEDA, NETTIE-CNP

## 2024-01-05 NOTE — PROGRESS NOTES
Subjective   33 y.o.  presenting for postpartum follow-up.    Delivery Date: 2023  Gestational Age: 39.2wks   Type of Delivery: , Low Transverse  for arrest of active phase, no cervical change from 8cm for > 3 hours. Had ost-op wound infection, completed course of bactrim, has cousin (who is a nurse) pack/dressing change wound daily.     Pregnancy Problems (from 23 to 24)       Problem Noted Resolved    Chronic hypertension 10/12/2023 by MEE Cheek, APRN-CNP No    Priority:  Medium      Overview Addendum 10/14/2023  2:47 PM by MEE Becerra       No meds         37 weeks gestation of pregnancy 10/26/2023 by MEE Cheek, APRN-CNP 2023 by MEE Butler    Priority:  Medium      Abnormal glucose tolerance in pregnancy 10/12/2023 by MEE Cheek, APRN-CNP 2023 by JOSSELYN Morris    Priority:  Medium      Overview Addendum 10/14/2023  2:49 PM by MEE Becerra       Normal 3 hr gtt         Prenatal care, subsequent pregnancy in third trimester 10/12/2023 by MEE Cheek, APRN-CNP 2023 by JOSSELYN Morris    Priority:  Medium      34 weeks gestation of pregnancy 10/12/2023 by MEE Cheek, APRN-CNP 10/14/2023 by MEE Becerra    Priority:  Medium       delivery delivered 2023 by Jeny Hernandez, APRN-CNP 2024 by Laurel Mclaughlin LPN    Acute blood loss anemia 2023 by Jeny Hernandez, APRN-CNP 2024 by Laurel Mclaughlin LPN    39 weeks gestation of pregnancy 2023 by MEE Butler 2023 by Jeny Somich, APRN-CNP            Concerns: needs work clearance note - planning to go back   Pain: controlled  Lacerations: n/a  Lochia: stopped then had period -  Sexual Intimacy: No  Contraceptive Method: None currently, wants to start something   Feeding Method: She is bottle feeding.    Lactation Consult Needed?: No    Birth Trauma: No  Bonding with Baby: well with baby girl, Cooper  Mood: patient's mother helps with baby, fob in detention, feels well supported, denies feeling down or depressed   Postpartum Depression: Low Risk  (2024)    Richardson  Depression Scale     Last EPDS Total Score: 0     Last EPDS Self Harm Result: Never       Last pap: 2023 ASCUS HPV + --> s/p OB colpo, per Dr. Rowley's note, plan for pap PP     Physical Exam  Constitutional:       Appearance: Normal appearance.   Genitourinary:      Vulva normal.      Uterus is not tender.      Uterus is anteverted.   HENT:      Head: Normocephalic.   Pulmonary:      Effort: Pulmonary effort is normal.   Abdominal:      General: A surgical scar is present.      Palpations: Abdomen is soft.      Tenderness: There is no abdominal tenderness.      Hernia: No hernia is present.          Comments: Incision appears well-healed, intact with exception of 3cm space right of midline. Packing in place. No bleeding, erythema, drainage or tenderness.    Musculoskeletal:         General: Normal range of motion.      Cervical back: Normal range of motion.   Neurological:      General: No focal deficit present.      Mental Status: She is alert.   Skin:     General: Skin is warm and dry.   Psychiatric:         Mood and Affect: Mood normal.         Thought Content: Thought content normal.         Problem List Items Addressed This Visit             ICD-10-CM    ASCUS with positive high risk HPV cervical R87.610, R87.810    Relevant Orders    THINPREP PAP    Encounter for postpartum visit - Primary Z39.2    Relevant Medications    prenatal vitamin, iron-folic, 27 mg iron-800 mcg folic acid tablet    medroxyPROGESTERone (Depo-Provera) injection 150 mg (Completed)    Other Relevant Orders    Follow Up In Gynecology    Depo-Provera contraceptive status Z30.42    Relevant Medications    medroxyPROGESTERone (Depo-Provera) injection 150 mg  (Completed)    History of wound infection Z86.19      Plan    Advised to call office for signs/symptoms of infection, abnormal bleeding, mood changes, or other concerning symptoms.   Cleared to resume normal activity as desired  Discussed birth control options including LARC methods (IUD and nexplanon), nuvaring, depo and oral contraception. Reviewed levels of effectiveness, risks/benefits and side effects with mentioned methods.No contraindications identified in patient history. Patient desires depo today. Depo given   Pap collected - discussed she may need colposcopy   Incision evaluated with Dr. Bolden - plan for incision check again in 2 weeks   Julisa Cui, NETTIE-CNM, APRN-CNP

## 2024-01-06 PROBLEM — Z30.42 DEPO-PROVERA CONTRACEPTIVE STATUS: Status: ACTIVE | Noted: 2024-01-06

## 2024-01-06 PROBLEM — Z86.19 HISTORY OF WOUND INFECTION: Status: ACTIVE | Noted: 2024-01-06

## 2024-01-16 ENCOUNTER — CONSULT (OUTPATIENT)
Dept: OPHTHALMOLOGY | Facility: CLINIC | Age: 34
End: 2024-01-16
Payer: COMMERCIAL

## 2024-01-16 DIAGNOSIS — H52.223 REGULAR ASTIGMATISM OF BOTH EYES: ICD-10-CM

## 2024-01-16 DIAGNOSIS — H52.13 MYOPIA OF BOTH EYES: Primary | ICD-10-CM

## 2024-01-16 PROCEDURE — 92004 COMPRE OPH EXAM NEW PT 1/>: CPT | Performed by: OPTOMETRIST

## 2024-01-16 PROCEDURE — 92015 DETERMINE REFRACTIVE STATE: CPT | Performed by: OPTOMETRIST

## 2024-01-16 PROCEDURE — RXMED WILLOW AMBULATORY MEDICATION CHARGE

## 2024-01-16 ASSESSMENT — CONF VISUAL FIELD
OD_SUPERIOR_TEMPORAL_RESTRICTION: 0
OD_SUPERIOR_NASAL_RESTRICTION: 0
OD_NORMAL: 1
OS_NORMAL: 1
OS_SUPERIOR_TEMPORAL_RESTRICTION: 0
OD_INFERIOR_NASAL_RESTRICTION: 0
OS_INFERIOR_TEMPORAL_RESTRICTION: 0
OD_INFERIOR_TEMPORAL_RESTRICTION: 0
OS_SUPERIOR_NASAL_RESTRICTION: 0
OS_INFERIOR_NASAL_RESTRICTION: 0
METHOD: COUNTING FINGERS

## 2024-01-16 ASSESSMENT — REFRACTION_MANIFEST
OD_AXIS: 170
OS_CYLINDER: +1.00
OD_SPHERE: -1.25
OD_SPHERE: -1.25
OS_AXIS: 171
OS_SPHERE: -0.75
OS_AXIS: 175
OD_CYLINDER: +1.50
OD_CYLINDER: +1.00
OS_SPHERE: -0.75
METHOD_AUTOREFRACTION: 1
OS_CYLINDER: +1.25
OD_AXIS: 173

## 2024-01-16 ASSESSMENT — TONOMETRY
OS_IOP_MMHG: 12
OD_IOP_MMHG: 12
IOP_METHOD: GOLDMANN APPLANATION

## 2024-01-16 ASSESSMENT — VISUAL ACUITY
METHOD: SNELLEN - LINEAR
OS_SC: 20/25-2
OD_SC: 20/40-1

## 2024-01-16 ASSESSMENT — REFRACTION
OD_CYLINDER: +1.25
OS_AXIS: 166
OD_SPHERE: -0.75
OS_CYLINDER: +0.75
OD_AXIS: 173
OS_SPHERE: -0.25

## 2024-01-16 ASSESSMENT — ENCOUNTER SYMPTOMS
RESPIRATORY NEGATIVE: 0
MUSCULOSKELETAL NEGATIVE: 0
ALLERGIC/IMMUNOLOGIC NEGATIVE: 0
GASTROINTESTINAL NEGATIVE: 0
PSYCHIATRIC NEGATIVE: 0
EYES NEGATIVE: 0
HEMATOLOGIC/LYMPHATIC NEGATIVE: 0
NEUROLOGICAL NEGATIVE: 0
CARDIOVASCULAR NEGATIVE: 0
ENDOCRINE NEGATIVE: 0
CONSTITUTIONAL NEGATIVE: 0

## 2024-01-16 ASSESSMENT — EXTERNAL EXAM - RIGHT EYE: OD_EXAM: NORMAL

## 2024-01-16 ASSESSMENT — CUP TO DISC RATIO
OS_RATIO: 0.2
OD_RATIO: 0.2

## 2024-01-16 ASSESSMENT — SLIT LAMP EXAM - LIDS
COMMENTS: NORMAL
COMMENTS: NORMAL

## 2024-01-16 ASSESSMENT — EXTERNAL EXAM - LEFT EYE: OS_EXAM: NORMAL

## 2024-01-16 NOTE — PROGRESS NOTES
Assessment/Plan   Diagnoses and all orders for this visit:  Myopia of both eyes  Regular astigmatism of both eyes  Established patient, uncorrected refractive error, issued spec rx for full-time wear, reinforced importance. Ocular structures and alignment otherwise normal. RTC 1yr for CEX, sooner with worsening visual concerns.

## 2024-01-18 LAB
CYTOLOGY CMNT CVX/VAG CYTO-IMP: NORMAL
HPV HR 12 DNA GENITAL QL NAA+PROBE: POSITIVE
HPV HR GENOTYPES PNL CVX NAA+PROBE: POSITIVE
HPV16 DNA SPEC QL NAA+PROBE: NEGATIVE
HPV18 DNA SPEC QL NAA+PROBE: NEGATIVE
LAB AP HPV GENOTYPE QUESTION: YES
LAB AP HPV HR: NORMAL
LAB AP PREVIOUS ABNORMAL HISTORY: NORMAL
LABORATORY COMMENT REPORT: NORMAL
LMP START DATE: NORMAL
PATH REPORT.TOTAL CANCER: NORMAL

## 2024-01-19 ENCOUNTER — APPOINTMENT (OUTPATIENT)
Dept: OBSTETRICS AND GYNECOLOGY | Facility: HOSPITAL | Age: 34
End: 2024-01-19
Payer: COMMERCIAL

## 2024-01-23 ENCOUNTER — TELEPHONE (OUTPATIENT)
Dept: OBSTETRICS AND GYNECOLOGY | Facility: HOSPITAL | Age: 34
End: 2024-01-23
Payer: COMMERCIAL

## 2024-01-23 DIAGNOSIS — R87.7 LOW GRADE SQUAMOUS INTRAEPITHELIAL LESION (LGSIL) ON BIOPSY OF CERVIX: ICD-10-CM

## 2024-01-23 NOTE — TELEPHONE ENCOUNTER
Placed call to patient to discuss abnormal pap.   Patient identified by name and date of birth.  Patient informed her pap results showed abnormal cell, low grade cell changes referred to as LGSIL.    Patient educated that this refers to the type of cells that were affected  Patient informed that her pap was positive for HPV.    Patient educated that HPV virus is the virus that is transmitted sexually and can cause cancer and/or genital warts.    Informed that this procedure may cause some cramping and that she may take ibuprofen an hour prior to her appointment, and she may have some spotting that can last a day or so.   Patient has had a colpo in the past and denies the need for further education on procedure    Patient scheduled for Colpo with Julisa on 2/16 @10:30 am    Patient verbalized understanding and all questions were answered.  Encouraged to call office if she has any further questions or concerns.     Idania REINOSO, RN, CLC     ----- Message from NETTIE Cheek-SEDA, NETTIE-CNP sent at 1/22/2024  9:59 PM EST -----  Please schedule colposcopy.

## 2024-02-16 ENCOUNTER — APPOINTMENT (OUTPATIENT)
Dept: OBSTETRICS AND GYNECOLOGY | Facility: HOSPITAL | Age: 34
End: 2024-02-16
Payer: COMMERCIAL

## 2024-02-23 ENCOUNTER — APPOINTMENT (OUTPATIENT)
Dept: OBSTETRICS AND GYNECOLOGY | Facility: HOSPITAL | Age: 34
End: 2024-02-23
Payer: COMMERCIAL

## 2024-03-15 ENCOUNTER — APPOINTMENT (OUTPATIENT)
Dept: OBSTETRICS AND GYNECOLOGY | Facility: HOSPITAL | Age: 34
End: 2024-03-15
Payer: COMMERCIAL

## 2024-03-19 ENCOUNTER — PHARMACY VISIT (OUTPATIENT)
Dept: PHARMACY | Facility: CLINIC | Age: 34
End: 2024-03-19
Payer: MEDICAID

## 2024-03-19 PROCEDURE — RXMED WILLOW AMBULATORY MEDICATION CHARGE

## 2024-03-20 ENCOUNTER — PHARMACY VISIT (OUTPATIENT)
Dept: PHARMACY | Facility: CLINIC | Age: 34
End: 2024-03-20
Payer: MEDICAID

## 2024-03-26 ENCOUNTER — TELEPHONE (OUTPATIENT)
Dept: OBSTETRICS AND GYNECOLOGY | Facility: HOSPITAL | Age: 34
End: 2024-03-26
Payer: COMMERCIAL

## 2024-03-26 NOTE — TELEPHONE ENCOUNTER
-Attempted to call Ms. Acuña to assist with rescheduling Colpo. Left voicemail encouraging patient to call back the office.  Idania PIERSONN, RN, [facility]     ---- Message from NETTIE Cheek-SEDA, NETTIE-CNP sent at 1/22/2024  9:59 PM EST -----  Please schedule colposcopy.

## 2024-05-03 ENCOUNTER — TELEPHONE (OUTPATIENT)
Dept: OBSTETRICS AND GYNECOLOGY | Facility: HOSPITAL | Age: 34
End: 2024-05-03
Payer: COMMERCIAL

## 2024-05-03 DIAGNOSIS — J30.2 SEASONAL ALLERGIES: Primary | ICD-10-CM

## 2024-05-03 NOTE — TELEPHONE ENCOUNTER
Received medication refill request for prenatal vitamins and Claritin.   Upon chart review noticed patient missed her colposcopy appointment.  Left voicemail for patient to reschedule missed appointment.

## 2024-05-06 PROCEDURE — RXMED WILLOW AMBULATORY MEDICATION CHARGE

## 2024-05-06 RX ORDER — LORATADINE 10 MG/1
10 TABLET ORAL NIGHTLY
Qty: 30 TABLET | Refills: 0 | Status: SHIPPED | OUTPATIENT
Start: 2024-05-06 | End: 2024-06-05

## 2024-05-06 RX ORDER — B-COMPLEX WITH VITAMIN C
1 TABLET ORAL DAILY
Qty: 30 TABLET | Refills: 0 | Status: SHIPPED | OUTPATIENT
Start: 2024-05-06 | End: 2025-05-06

## 2024-05-07 ENCOUNTER — APPOINTMENT (OUTPATIENT)
Dept: OBSTETRICS AND GYNECOLOGY | Facility: HOSPITAL | Age: 34
End: 2024-05-07
Payer: COMMERCIAL

## 2024-05-09 ENCOUNTER — PHARMACY VISIT (OUTPATIENT)
Dept: PHARMACY | Facility: CLINIC | Age: 34
End: 2024-05-09
Payer: MEDICAID

## 2024-06-06 ENCOUNTER — TELEPHONE (OUTPATIENT)
Dept: OBSTETRICS AND GYNECOLOGY | Facility: HOSPITAL | Age: 34
End: 2024-06-06
Payer: COMMERCIAL

## 2024-06-06 NOTE — TELEPHONE ENCOUNTER
Contacted patient to assist with rescheduling. Patient identified by name and , Colpo rescheduled for  at 1:00pm, discussed importance. Patient would also like depo at this appointment.  Idania Holder MSN, RN, CLC     ----- Message from NETTIE Cheek-SEDA, NETTIE-CNP sent at 2024  9:59 PM EST -----  Please schedule colposcopy.

## 2024-06-14 PROCEDURE — RXMED WILLOW AMBULATORY MEDICATION CHARGE

## 2024-06-18 ENCOUNTER — PHARMACY VISIT (OUTPATIENT)
Dept: PHARMACY | Facility: CLINIC | Age: 34
End: 2024-06-18
Payer: MEDICAID

## 2024-06-26 RX ORDER — B-COMPLEX WITH VITAMIN C
1 TABLET ORAL DAILY
Qty: 30 TABLET | Refills: 0 | Status: SHIPPED | OUTPATIENT
Start: 2024-06-26 | End: 2025-06-26

## 2024-06-27 ENCOUNTER — PHARMACY VISIT (OUTPATIENT)
Dept: PHARMACY | Facility: CLINIC | Age: 34
End: 2024-06-27
Payer: MEDICAID

## 2024-06-27 PROCEDURE — RXMED WILLOW AMBULATORY MEDICATION CHARGE

## 2024-07-16 ENCOUNTER — TELEPHONE (OUTPATIENT)
Dept: OBSTETRICS AND GYNECOLOGY | Facility: HOSPITAL | Age: 34
End: 2024-07-16
Payer: COMMERCIAL

## 2024-07-16 NOTE — TELEPHONE ENCOUNTER
Contacted patient to provide gentle reminder of today's appointment, unable to reach patient. Left voicemail encouraging her to call back.  Idania Holder MSN, RN, CLC     ----- Message from Julisa Cui sent at 1/22/2024  9:59 PM EST -----  Please schedule colposcopy.

## 2024-07-18 ENCOUNTER — DOCUMENTATION (OUTPATIENT)
Dept: OBSTETRICS AND GYNECOLOGY | Facility: HOSPITAL | Age: 34
End: 2024-07-18
Payer: COMMERCIAL

## 2024-07-18 NOTE — PROGRESS NOTES
Several attempts have been made to contact the patient   RN called patient at the number listed in her chart and patient no showed colposcopy visit on 1/30, 2/16,2/23,3/15,3/26, 5/6, 6/14 and 7/16.   A letter was sent via [address] encouraging patient to reschedule at her convenience, provider notified.

## 2024-08-17 ENCOUNTER — HOSPITAL ENCOUNTER (INPATIENT)
Facility: HOSPITAL | Age: 34
End: 2024-08-17
Attending: STUDENT IN AN ORGANIZED HEALTH CARE EDUCATION/TRAINING PROGRAM | Admitting: ORTHOPAEDIC SURGERY
Payer: COMMERCIAL

## 2024-08-17 ENCOUNTER — APPOINTMENT (OUTPATIENT)
Dept: RADIOLOGY | Facility: HOSPITAL | Age: 34
End: 2024-08-17
Payer: COMMERCIAL

## 2024-08-17 ENCOUNTER — CLINICAL SUPPORT (OUTPATIENT)
Dept: EMERGENCY MEDICINE | Facility: HOSPITAL | Age: 34
End: 2024-08-17
Payer: COMMERCIAL

## 2024-08-17 DIAGNOSIS — R94.31 ABNORMAL ELECTROCARDIOGRAM (ECG) (EKG): ICD-10-CM

## 2024-08-17 DIAGNOSIS — I48.91 ATRIAL FIB/FLUTTER, TRANSIENT (MULTI): ICD-10-CM

## 2024-08-17 DIAGNOSIS — I48.92 ATRIAL FIB/FLUTTER, TRANSIENT (MULTI): ICD-10-CM

## 2024-08-17 DIAGNOSIS — S82.851A TRIMALLEOLAR FRACTURE OF ANKLE, CLOSED, RIGHT, INITIAL ENCOUNTER: Primary | ICD-10-CM

## 2024-08-17 LAB
ABO GROUP (TYPE) IN BLOOD: NORMAL
ALBUMIN SERPL BCP-MCNC: 4 G/DL (ref 3.4–5)
ALP SERPL-CCNC: 74 U/L (ref 33–110)
ALT SERPL W P-5'-P-CCNC: 13 U/L (ref 7–45)
ANION GAP SERPL CALC-SCNC: 16 MMOL/L (ref 10–20)
ANTIBODY SCREEN: NORMAL
APTT PPP: 31 SECONDS (ref 27–38)
AST SERPL W P-5'-P-CCNC: 20 U/L (ref 9–39)
ATRIAL RATE: 67 BPM
BASOPHILS # BLD AUTO: 0.09 X10*3/UL (ref 0–0.1)
BASOPHILS NFR BLD AUTO: 0.7 %
BILIRUB SERPL-MCNC: 0.4 MG/DL (ref 0–1.2)
BUN SERPL-MCNC: 10 MG/DL (ref 6–23)
CALCIUM SERPL-MCNC: 8.4 MG/DL (ref 8.6–10.6)
CHLORIDE SERPL-SCNC: 111 MMOL/L (ref 98–107)
CO2 SERPL-SCNC: 18 MMOL/L (ref 21–32)
CREAT SERPL-MCNC: 0.78 MG/DL (ref 0.5–1.05)
EGFRCR SERPLBLD CKD-EPI 2021: >90 ML/MIN/1.73M*2
EOSINOPHIL # BLD AUTO: 0.07 X10*3/UL (ref 0–0.7)
EOSINOPHIL NFR BLD AUTO: 0.6 %
ERYTHROCYTE [DISTWIDTH] IN BLOOD BY AUTOMATED COUNT: 14.8 % (ref 11.5–14.5)
GLUCOSE SERPL-MCNC: 78 MG/DL (ref 74–99)
HCT VFR BLD AUTO: 38.6 % (ref 36–46)
HGB BLD-MCNC: 12.1 G/DL (ref 12–16)
IMM GRANULOCYTES # BLD AUTO: 0.05 X10*3/UL (ref 0–0.7)
IMM GRANULOCYTES NFR BLD AUTO: 0.4 % (ref 0–0.9)
INR PPP: 1.2 (ref 0.9–1.1)
LYMPHOCYTES # BLD AUTO: 2.08 X10*3/UL (ref 1.2–4.8)
LYMPHOCYTES NFR BLD AUTO: 16.7 %
MCH RBC QN AUTO: 25.7 PG (ref 26–34)
MCHC RBC AUTO-ENTMCNC: 31.3 G/DL (ref 32–36)
MCV RBC AUTO: 82 FL (ref 80–100)
MONOCYTES # BLD AUTO: 1.02 X10*3/UL (ref 0.1–1)
MONOCYTES NFR BLD AUTO: 8.2 %
NEUTROPHILS # BLD AUTO: 9.11 X10*3/UL (ref 1.2–7.7)
NEUTROPHILS NFR BLD AUTO: 73.4 %
NRBC BLD-RTO: 0 /100 WBCS (ref 0–0)
P AXIS: 42 DEGREES
P OFFSET: 187 MS
P ONSET: 145 MS
PLATELET # BLD AUTO: 168 X10*3/UL (ref 150–450)
POTASSIUM SERPL-SCNC: 3.8 MMOL/L (ref 3.5–5.3)
PR INTERVAL: 158 MS
PROT SERPL-MCNC: 6.5 G/DL (ref 6.4–8.2)
PROTHROMBIN TIME: 13.8 SECONDS (ref 9.8–12.8)
Q ONSET: 224 MS
QRS COUNT: 11 BEATS
QRS DURATION: 70 MS
QT INTERVAL: 400 MS
QTC CALCULATION(BAZETT): 422 MS
QTC FREDERICIA: 415 MS
R AXIS: 36 DEGREES
RBC # BLD AUTO: 4.7 X10*6/UL (ref 4–5.2)
RH FACTOR (ANTIGEN D): NORMAL
SODIUM SERPL-SCNC: 141 MMOL/L (ref 136–145)
T AXIS: 4 DEGREES
T OFFSET: 424 MS
VENTRICULAR RATE: 67 BPM
WBC # BLD AUTO: 12.4 X10*3/UL (ref 4.4–11.3)

## 2024-08-17 PROCEDURE — 73610 X-RAY EXAM OF ANKLE: CPT | Mod: RT

## 2024-08-17 PROCEDURE — 73590 X-RAY EXAM OF LOWER LEG: CPT | Mod: RT

## 2024-08-17 PROCEDURE — 2500000004 HC RX 250 GENERAL PHARMACY W/ HCPCS (ALT 636 FOR OP/ED): Mod: SE

## 2024-08-17 PROCEDURE — 36415 COLL VENOUS BLD VENIPUNCTURE: CPT

## 2024-08-17 PROCEDURE — 84443 ASSAY THYROID STIM HORMONE: CPT

## 2024-08-17 PROCEDURE — 86901 BLOOD TYPING SEROLOGIC RH(D): CPT

## 2024-08-17 PROCEDURE — 73560 X-RAY EXAM OF KNEE 1 OR 2: CPT | Mod: LT

## 2024-08-17 PROCEDURE — 96374 THER/PROPH/DIAG INJ IV PUSH: CPT

## 2024-08-17 PROCEDURE — 84702 CHORIONIC GONADOTROPIN TEST: CPT

## 2024-08-17 PROCEDURE — 93005 ELECTROCARDIOGRAM TRACING: CPT

## 2024-08-17 PROCEDURE — 96376 TX/PRO/DX INJ SAME DRUG ADON: CPT

## 2024-08-17 PROCEDURE — 96375 TX/PRO/DX INJ NEW DRUG ADDON: CPT

## 2024-08-17 PROCEDURE — 99285 EMERGENCY DEPT VISIT HI MDM: CPT | Performed by: EMERGENCY MEDICINE

## 2024-08-17 PROCEDURE — 99285 EMERGENCY DEPT VISIT HI MDM: CPT | Mod: 25

## 2024-08-17 PROCEDURE — 71045 X-RAY EXAM CHEST 1 VIEW: CPT

## 2024-08-17 PROCEDURE — 85025 COMPLETE CBC W/AUTO DIFF WBC: CPT

## 2024-08-17 PROCEDURE — 73620 X-RAY EXAM OF FOOT: CPT | Mod: RT

## 2024-08-17 PROCEDURE — 80053 COMPREHEN METABOLIC PANEL: CPT

## 2024-08-17 PROCEDURE — 85610 PROTHROMBIN TIME: CPT

## 2024-08-17 PROCEDURE — 27816 TREATMENT OF ANKLE FRACTURE: CPT | Mod: RT

## 2024-08-17 RX ORDER — HYDROMORPHONE HYDROCHLORIDE 1 MG/ML
0.5 INJECTION, SOLUTION INTRAMUSCULAR; INTRAVENOUS; SUBCUTANEOUS ONCE
Status: COMPLETED | OUTPATIENT
Start: 2024-08-17 | End: 2024-08-17

## 2024-08-17 RX ORDER — ACETAMINOPHEN 325 MG/1
975 TABLET ORAL ONCE
Status: COMPLETED | OUTPATIENT
Start: 2024-08-17 | End: 2024-08-17

## 2024-08-17 RX ORDER — HYDROMORPHONE HYDROCHLORIDE 1 MG/ML
1 INJECTION, SOLUTION INTRAMUSCULAR; INTRAVENOUS; SUBCUTANEOUS ONCE
Status: DISCONTINUED | OUTPATIENT
Start: 2024-08-17 | End: 2024-08-17

## 2024-08-17 RX ORDER — LIDOCAINE HYDROCHLORIDE 10 MG/ML
10 INJECTION INFILTRATION; PERINEURAL ONCE
Status: DISCONTINUED | OUTPATIENT
Start: 2024-08-17 | End: 2024-08-20 | Stop reason: HOSPADM

## 2024-08-17 RX ORDER — HYDROMORPHONE HYDROCHLORIDE 1 MG/ML
1 INJECTION, SOLUTION INTRAMUSCULAR; INTRAVENOUS; SUBCUTANEOUS ONCE
Status: COMPLETED | OUTPATIENT
Start: 2024-08-17 | End: 2024-08-17

## 2024-08-17 RX ORDER — FENTANYL CITRATE 50 UG/ML
100 INJECTION, SOLUTION INTRAMUSCULAR; INTRAVENOUS ONCE
Status: COMPLETED | OUTPATIENT
Start: 2024-08-17 | End: 2024-08-17

## 2024-08-17 RX ORDER — HYDROMORPHONE HYDROCHLORIDE 1 MG/ML
INJECTION, SOLUTION INTRAMUSCULAR; INTRAVENOUS; SUBCUTANEOUS
Status: DISPENSED
Start: 2024-08-17 | End: 2024-08-18

## 2024-08-17 RX ADMIN — HYDROMORPHONE HYDROCHLORIDE 1 MG: 1 INJECTION, SOLUTION INTRAMUSCULAR; INTRAVENOUS; SUBCUTANEOUS at 20:42

## 2024-08-17 RX ADMIN — HYDROMORPHONE HYDROCHLORIDE 0.5 MG: 1 INJECTION, SOLUTION INTRAMUSCULAR; INTRAVENOUS; SUBCUTANEOUS at 18:52

## 2024-08-17 RX ADMIN — FENTANYL CITRATE 100 MCG: 50 INJECTION INTRAMUSCULAR; INTRAVENOUS at 21:24

## 2024-08-17 RX ADMIN — ACETAMINOPHEN 975 MG: 325 TABLET ORAL at 18:52

## 2024-08-17 ASSESSMENT — PAIN DESCRIPTION - ORIENTATION
ORIENTATION: RIGHT
ORIENTATION: RIGHT

## 2024-08-17 ASSESSMENT — PAIN DESCRIPTION - PROGRESSION: CLINICAL_PROGRESSION: NOT CHANGED

## 2024-08-17 ASSESSMENT — PAIN DESCRIPTION - FREQUENCY: FREQUENCY: CONSTANT/CONTINUOUS

## 2024-08-17 ASSESSMENT — COLUMBIA-SUICIDE SEVERITY RATING SCALE - C-SSRS
2. HAVE YOU ACTUALLY HAD ANY THOUGHTS OF KILLING YOURSELF?: NO
6. HAVE YOU EVER DONE ANYTHING, STARTED TO DO ANYTHING, OR PREPARED TO DO ANYTHING TO END YOUR LIFE?: NO
1. IN THE PAST MONTH, HAVE YOU WISHED YOU WERE DEAD OR WISHED YOU COULD GO TO SLEEP AND NOT WAKE UP?: NO

## 2024-08-17 ASSESSMENT — PAIN SCALES - GENERAL
PAINLEVEL_OUTOF10: 10 - WORST POSSIBLE PAIN
PAINLEVEL_OUTOF10: 10 - WORST POSSIBLE PAIN

## 2024-08-17 ASSESSMENT — PAIN - FUNCTIONAL ASSESSMENT: PAIN_FUNCTIONAL_ASSESSMENT: 0-10

## 2024-08-17 ASSESSMENT — PAIN DESCRIPTION - DESCRIPTORS: DESCRIPTORS: DISCOMFORT

## 2024-08-17 ASSESSMENT — PAIN DESCRIPTION - PAIN TYPE: TYPE: ACUTE PAIN

## 2024-08-17 ASSESSMENT — PAIN DESCRIPTION - LOCATION: LOCATION: ANKLE

## 2024-08-17 ASSESSMENT — PAIN SCALES - WONG BAKER: WONGBAKER_NUMERICALRESPONSE: HURTS WORST

## 2024-08-17 NOTE — ED TRIAGE NOTES
Pt brought in by EMS for a fall down about 5 steps at home. Pt says she saw her neighbors dog getting ready to come out the window when she fell from standing, and saw her ankle bone out of place and popped it back in. -LOC, -thinners. Pt was given 50 of fentanly IV in route for pain but she says it didn't help at all. Vitals are stable, pt is A&Ox4

## 2024-08-17 NOTE — ED PROVIDER NOTES
"History of Present Illness   Information Gathering: History is collected from the patient and chart review    HPI:  Rita Mckeon is a 33 y.o. female with PMH significant for hypertension presenting to the emergency department for right ankle pain. Patient states that she was sitting on her porch smoking a black and mild when the dog in the house across the street began trying to jump out of the window. Patient got up and ran down her porch toward neighbor of dog trying to jump out window when she missed a step coming down on her right ankle causing extreme eversion of the ankle. Patient states that she saw her bone \"pop out\". Patient instinctively pushed bone back into place with excruciating pain. States that bone did not come through the skin. States that she did not injure any other part of her body. No head strike. No headache, C-spine tenderness, loss of consciousness or use of blood thinners. Patient states that she has good motor and sensory function of toes distal to the injury.    Physical Exam   Triage vitals:  T 37 °C (98.6 °F)  HR 77  /89  RR 18  O2 100 % None (Room air)    Physical Exam  Constitutional:       Appearance: Normal appearance.   HENT:      Head: Normocephalic and atraumatic.   Eyes:      Extraocular Movements: Extraocular movements intact.      Pupils: Pupils are equal, round, and reactive to light.   Cardiovascular:      Rate and Rhythm: Normal rate and regular rhythm.      Comments: +2 DP pulses bilaterally.  Pulmonary:      Effort: Pulmonary effort is normal.      Breath sounds: Normal breath sounds.   Abdominal:      General: Abdomen is flat.      Palpations: Abdomen is soft.   Musculoskeletal:      Comments: Significant ankle swelling of lateral and medial aspects of the ankle joint. Significantly tender to palpation over anterior medial malleolus. No open breaks in the skin. Patient able to wiggle toes. Full sensation throughout entirety of right foot. "   Neurological:      General: No focal deficit present.      Mental Status: She is alert and oriented to person, place, and time.       Medical Decision Making & ED Course   Medical Decision Makin y.o. female with past medical history significant for hypertension presenting to the emergency department for ankle fracture. On presentation patient is hemodynamically stable and afebrile. Ankle and foot distal to the injury is neurovascularly intact. No other injuries noted on secondary exam. Laboratory workup initiated showing CMP is without severe electrolyte derangements and CBC with mildly elevated leukocyte count likely present in the setting of fracture. Coagulation and type and screen obtained for preop labs. For pain control, patient given 1.5 mg of Dilaudid during manipulation of ankle with an additional 100 mcg of fentanyl. Orthopedic surgery consulted upon initial examination of the patient who agreed to evaluate. Orthopedic performed hematoma block with 1% lidocaine and reduced ankle under fluoroscopy. At signout to oncoming provider, patient pending CT scan of right ankle with plan to admit to the orthopedic surgery for definitive surgical management.    ED Course:  ED Course as of 24   Sat Aug 17, 2024   1921 Spoke with XR team regarding expediting XR images [FN]    Ortho requested CT ankle for further eval  [FN]      ED Course User Index  [FN] Eliseo Harvey MD         Diagnoses as of 24   Trimalleolar fracture of ankle, closed, right, initial encounter       ----  EKG Independent Interpretation: EKG interpreted by myself. Please see ED Course for full interpretation.    Independent Result Review and Interpretation: Relevant laboratory and radiographic results were reviewed and independently interpreted by myself.  As necessary, they are commented on in the ED Course.    Chronic conditions affecting the patient's care: As documented above in MDM    The patient was discussed  with the following consultants/services: As described in MDM      Disposition   Patient was signed out pending completion of their work-up.  Please see the next provider's transition of care note for the remainder of the patient's care.     Procedures   Procedures    Patient seen and discussed with ED attending physician.        Attending Attestation  The patient was seen by the resident/fellow.  I have personally performed a substantive portion of the encounter.  I have seen and examined the patient; agree with the workup, evaluation, MDM, management and diagnosis.  The care plan has been discussed with the resident; I have reviewed the resident’s note and agree with the documented findings.          MD Evan Suarez MD  Emergency Medicine, PGY-2      Otilio Lim MD  Resident  08/17/24 [id number]       Eliseo Harvey MD  08/20/24 2101       Eliseo Harvey MD  08/20/24 2102       Eliseo Harvey MD  08/20/24 2102

## 2024-08-18 ENCOUNTER — ANESTHESIA (OUTPATIENT)
Dept: OPERATING ROOM | Facility: HOSPITAL | Age: 34
End: 2024-08-18
Payer: COMMERCIAL

## 2024-08-18 ENCOUNTER — APPOINTMENT (OUTPATIENT)
Dept: RADIOLOGY | Facility: HOSPITAL | Age: 34
End: 2024-08-18
Payer: COMMERCIAL

## 2024-08-18 ENCOUNTER — ANESTHESIA EVENT (OUTPATIENT)
Dept: OPERATING ROOM | Facility: HOSPITAL | Age: 34
End: 2024-08-18
Payer: COMMERCIAL

## 2024-08-18 ENCOUNTER — APPOINTMENT (OUTPATIENT)
Dept: CARDIOLOGY | Facility: HOSPITAL | Age: 34
End: 2024-08-18
Payer: COMMERCIAL

## 2024-08-18 VITALS
SYSTOLIC BLOOD PRESSURE: 166 MMHG | WEIGHT: 245 LBS | OXYGEN SATURATION: 100 % | TEMPERATURE: 97 F | HEIGHT: 66 IN | DIASTOLIC BLOOD PRESSURE: 100 MMHG | HEART RATE: 65 BPM | RESPIRATION RATE: 18 BRPM | BODY MASS INDEX: 39.37 KG/M2

## 2024-08-18 PROBLEM — S82.851A TRIMALLEOLAR FRACTURE OF ANKLE, CLOSED, RIGHT, INITIAL ENCOUNTER: Status: ACTIVE | Noted: 2024-08-18

## 2024-08-18 PROBLEM — S82.891A CLOSED RIGHT ANKLE FRACTURE, INITIAL ENCOUNTER: Status: ACTIVE | Noted: 2024-08-18

## 2024-08-18 LAB — B-HCG SERPL-ACNC: <3 MIU/ML

## 2024-08-18 PROCEDURE — C1713 ANCHOR/SCREW BN/BN,TIS/BN: HCPCS | Performed by: ORTHOPAEDIC SURGERY

## 2024-08-18 PROCEDURE — 0QSJXZZ REPOSITION RIGHT FIBULA, EXTERNAL APPROACH: ICD-10-PCS

## 2024-08-18 PROCEDURE — 2500000004 HC RX 250 GENERAL PHARMACY W/ HCPCS (ALT 636 FOR OP/ED): Performed by: ORTHOPAEDIC SURGERY

## 2024-08-18 PROCEDURE — 27823 TREATMENT OF ANKLE FRACTURE: CPT | Performed by: ORTHOPAEDIC SURGERY

## 2024-08-18 PROCEDURE — 99222 1ST HOSP IP/OBS MODERATE 55: CPT | Performed by: ORTHOPAEDIC SURGERY

## 2024-08-18 PROCEDURE — 27829 TREAT LOWER LEG JOINT: CPT | Performed by: ORTHOPAEDIC SURGERY

## 2024-08-18 PROCEDURE — G0378 HOSPITAL OBSERVATION PER HR: HCPCS

## 2024-08-18 PROCEDURE — 2500000004 HC RX 250 GENERAL PHARMACY W/ HCPCS (ALT 636 FOR OP/ED): Performed by: NURSE ANESTHETIST, CERTIFIED REGISTERED

## 2024-08-18 PROCEDURE — C1776 JOINT DEVICE (IMPLANTABLE): HCPCS | Performed by: ORTHOPAEDIC SURGERY

## 2024-08-18 PROCEDURE — A27823 PR OPEN TX TRIMALLEOLAR ANKLE FX W FIX PST LIP: Performed by: ANESTHESIOLOGY

## 2024-08-18 PROCEDURE — 7100000001 HC RECOVERY ROOM TIME - INITIAL BASE CHARGE: Performed by: ORTHOPAEDIC SURGERY

## 2024-08-18 PROCEDURE — 73700 CT LOWER EXTREMITY W/O DYE: CPT | Mod: RT

## 2024-08-18 PROCEDURE — 2500000005 HC RX 250 GENERAL PHARMACY W/O HCPCS: Performed by: ORTHOPAEDIC SURGERY

## 2024-08-18 PROCEDURE — 3600000009 HC OR TIME - EACH INCREMENTAL 1 MINUTE - PROCEDURE LEVEL FOUR: Performed by: ORTHOPAEDIC SURGERY

## 2024-08-18 PROCEDURE — 96365 THER/PROPH/DIAG IV INF INIT: CPT | Mod: 59

## 2024-08-18 PROCEDURE — 2500000004 HC RX 250 GENERAL PHARMACY W/ HCPCS (ALT 636 FOR OP/ED): Mod: JZ | Performed by: STUDENT IN AN ORGANIZED HEALTH CARE EDUCATION/TRAINING PROGRAM

## 2024-08-18 PROCEDURE — 2720000007 HC OR 272 NO HCPCS: Performed by: ORTHOPAEDIC SURGERY

## 2024-08-18 PROCEDURE — 1200000002 HC GENERAL ROOM WITH TELEMETRY DAILY

## 2024-08-18 PROCEDURE — 77071 MNL APPL STRS JT RADIOGRAPHY: CPT | Performed by: ORTHOPAEDIC SURGERY

## 2024-08-18 PROCEDURE — 93005 ELECTROCARDIOGRAM TRACING: CPT

## 2024-08-18 PROCEDURE — 2500000004 HC RX 250 GENERAL PHARMACY W/ HCPCS (ALT 636 FOR OP/ED)

## 2024-08-18 PROCEDURE — 0QSJ04Z REPOSITION RIGHT FIBULA WITH INTERNAL FIXATION DEVICE, OPEN APPROACH: ICD-10-PCS | Performed by: ORTHOPAEDIC SURGERY

## 2024-08-18 PROCEDURE — 0QSG04Z REPOSITION RIGHT TIBIA WITH INTERNAL FIXATION DEVICE, OPEN APPROACH: ICD-10-PCS | Performed by: ORTHOPAEDIC SURGERY

## 2024-08-18 PROCEDURE — 3700000001 HC GENERAL ANESTHESIA TIME - INITIAL BASE CHARGE: Performed by: ORTHOPAEDIC SURGERY

## 2024-08-18 PROCEDURE — 0SSF04Z REPOSITION RIGHT ANKLE JOINT WITH INTERNAL FIXATION DEVICE, OPEN APPROACH: ICD-10-PCS | Performed by: ORTHOPAEDIC SURGERY

## 2024-08-18 PROCEDURE — 2780000003 HC OR 278 NO HCPCS: Performed by: ORTHOPAEDIC SURGERY

## 2024-08-18 PROCEDURE — 7100000002 HC RECOVERY ROOM TIME - EACH INCREMENTAL 1 MINUTE: Performed by: ORTHOPAEDIC SURGERY

## 2024-08-18 PROCEDURE — 2500000005 HC RX 250 GENERAL PHARMACY W/O HCPCS: Performed by: NURSE ANESTHETIST, CERTIFIED REGISTERED

## 2024-08-18 PROCEDURE — 73700 CT LOWER EXTREMITY W/O DYE: CPT | Mod: RIGHT SIDE | Performed by: STUDENT IN AN ORGANIZED HEALTH CARE EDUCATION/TRAINING PROGRAM

## 2024-08-18 PROCEDURE — 3700000002 HC GENERAL ANESTHESIA TIME - EACH INCREMENTAL 1 MINUTE: Performed by: ORTHOPAEDIC SURGERY

## 2024-08-18 PROCEDURE — 93010 ELECTROCARDIOGRAM REPORT: CPT | Performed by: INTERNAL MEDICINE

## 2024-08-18 PROCEDURE — 0QSGXZZ REPOSITION RIGHT TIBIA, EXTERNAL APPROACH: ICD-10-PCS

## 2024-08-18 PROCEDURE — A27823 PR OPEN TX TRIMALLEOLAR ANKLE FX W FIX PST LIP: Performed by: NURSE ANESTHETIST, CERTIFIED REGISTERED

## 2024-08-18 PROCEDURE — 3600000004 HC OR TIME - INITIAL BASE CHARGE - PROCEDURE LEVEL FOUR: Performed by: ORTHOPAEDIC SURGERY

## 2024-08-18 PROCEDURE — 2500000001 HC RX 250 WO HCPCS SELF ADMINISTERED DRUGS (ALT 637 FOR MEDICARE OP): Mod: SE | Performed by: ANESTHESIOLOGY

## 2024-08-18 PROCEDURE — 2500000004 HC RX 250 GENERAL PHARMACY W/ HCPCS (ALT 636 FOR OP/ED): Mod: SE | Performed by: ANESTHESIOLOGY

## 2024-08-18 PROCEDURE — 2500000001 HC RX 250 WO HCPCS SELF ADMINISTERED DRUGS (ALT 637 FOR MEDICARE OP)

## 2024-08-18 DEVICE — SCREW, CORT 3.5 X 18 TI: Type: IMPLANTABLE DEVICE | Site: ANKLE | Status: FUNCTIONAL

## 2024-08-18 DEVICE — CANNULATED SCREW
Type: IMPLANTABLE DEVICE | Site: ANKLE | Status: FUNCTIONAL
Brand: DARTFIRE EDGE

## 2024-08-18 DEVICE — DEVICE, SYNDEMOSIS FIXATION, GRAVITY SYNCHFIX  P5: Type: IMPLANTABLE DEVICE | Site: ANKLE | Status: FUNCTIONAL

## 2024-08-18 DEVICE — SCREW, CORT 3.5 X 14 TI: Type: IMPLANTABLE DEVICE | Site: ANKLE | Status: FUNCTIONAL

## 2024-08-18 DEVICE — SCREW, CORT 3.5 X 16 TI: Type: IMPLANTABLE DEVICE | Site: ANKLE | Status: FUNCTIONAL

## 2024-08-18 RX ORDER — ONDANSETRON HYDROCHLORIDE 2 MG/ML
4 INJECTION, SOLUTION INTRAVENOUS ONCE AS NEEDED
Status: DISCONTINUED | OUTPATIENT
Start: 2024-08-18 | End: 2024-08-18 | Stop reason: HOSPADM

## 2024-08-18 RX ORDER — HYDRALAZINE HYDROCHLORIDE 20 MG/ML
5 INJECTION INTRAMUSCULAR; INTRAVENOUS EVERY 30 MIN PRN
Status: DISCONTINUED | OUTPATIENT
Start: 2024-08-18 | End: 2024-08-18 | Stop reason: HOSPADM

## 2024-08-18 RX ORDER — FENTANYL CITRATE 50 UG/ML
INJECTION, SOLUTION INTRAMUSCULAR; INTRAVENOUS AS NEEDED
Status: DISCONTINUED | OUTPATIENT
Start: 2024-08-18 | End: 2024-08-18

## 2024-08-18 RX ORDER — OXYCODONE HYDROCHLORIDE 5 MG/1
5 TABLET ORAL EVERY 4 HOURS PRN
Status: DISCONTINUED | OUTPATIENT
Start: 2024-08-18 | End: 2024-08-18 | Stop reason: HOSPADM

## 2024-08-18 RX ORDER — OXYCODONE HYDROCHLORIDE 5 MG/1
10 TABLET ORAL EVERY 4 HOURS PRN
Status: DISCONTINUED | OUTPATIENT
Start: 2024-08-18 | End: 2024-08-18 | Stop reason: HOSPADM

## 2024-08-18 RX ORDER — TRANEXAMIC ACID 100 MG/ML
INJECTION, SOLUTION INTRAVENOUS AS NEEDED
Status: DISCONTINUED | OUTPATIENT
Start: 2024-08-18 | End: 2024-08-18

## 2024-08-18 RX ORDER — OXYCODONE HYDROCHLORIDE 5 MG/1
10 TABLET ORAL EVERY 4 HOURS PRN
Status: DISCONTINUED | OUTPATIENT
Start: 2024-08-18 | End: 2024-08-20 | Stop reason: HOSPADM

## 2024-08-18 RX ORDER — ONDANSETRON HYDROCHLORIDE 2 MG/ML
4 INJECTION, SOLUTION INTRAVENOUS EVERY 8 HOURS PRN
Status: DISCONTINUED | OUTPATIENT
Start: 2024-08-18 | End: 2024-08-20 | Stop reason: HOSPADM

## 2024-08-18 RX ORDER — LIDOCAINE HYDROCHLORIDE 10 MG/ML
0.1 INJECTION INFILTRATION; PERINEURAL ONCE
Status: DISCONTINUED | OUTPATIENT
Start: 2024-08-18 | End: 2024-08-18 | Stop reason: HOSPADM

## 2024-08-18 RX ORDER — METHOCARBAMOL 100 MG/ML
1000 INJECTION, SOLUTION INTRAMUSCULAR; INTRAVENOUS ONCE
Status: DISCONTINUED | OUTPATIENT
Start: 2024-08-18 | End: 2024-08-18 | Stop reason: HOSPADM

## 2024-08-18 RX ORDER — MAGNESIUM SULFATE HEPTAHYDRATE 500 MG/ML
INJECTION, SOLUTION INTRAMUSCULAR; INTRAVENOUS AS NEEDED
Status: DISCONTINUED | OUTPATIENT
Start: 2024-08-18 | End: 2024-08-18

## 2024-08-18 RX ORDER — ACETAMINOPHEN 325 MG/1
650 TABLET ORAL EVERY 4 HOURS PRN
Status: DISCONTINUED | OUTPATIENT
Start: 2024-08-18 | End: 2024-08-18 | Stop reason: HOSPADM

## 2024-08-18 RX ORDER — CEFAZOLIN 1 G/1
INJECTION, POWDER, FOR SOLUTION INTRAVENOUS AS NEEDED
Status: DISCONTINUED | OUTPATIENT
Start: 2024-08-18 | End: 2024-08-18

## 2024-08-18 RX ORDER — HYDROMORPHONE HYDROCHLORIDE 1 MG/ML
0.5 INJECTION, SOLUTION INTRAMUSCULAR; INTRAVENOUS; SUBCUTANEOUS EVERY 5 MIN PRN
Status: DISCONTINUED | OUTPATIENT
Start: 2024-08-18 | End: 2024-08-18 | Stop reason: HOSPADM

## 2024-08-18 RX ORDER — ALBUTEROL SULFATE 0.83 MG/ML
2.5 SOLUTION RESPIRATORY (INHALATION) ONCE AS NEEDED
Status: DISCONTINUED | OUTPATIENT
Start: 2024-08-18 | End: 2024-08-18 | Stop reason: HOSPADM

## 2024-08-18 RX ORDER — DIPHENHYDRAMINE HYDROCHLORIDE 50 MG/ML
12.5 INJECTION INTRAMUSCULAR; INTRAVENOUS ONCE AS NEEDED
Status: DISCONTINUED | OUTPATIENT
Start: 2024-08-18 | End: 2024-08-18 | Stop reason: HOSPADM

## 2024-08-18 RX ORDER — METOCLOPRAMIDE HYDROCHLORIDE 5 MG/ML
10 INJECTION INTRAMUSCULAR; INTRAVENOUS ONCE AS NEEDED
Status: DISCONTINUED | OUTPATIENT
Start: 2024-08-18 | End: 2024-08-18 | Stop reason: HOSPADM

## 2024-08-18 RX ORDER — METHOCARBAMOL 100 MG/ML
1000 INJECTION, SOLUTION INTRAMUSCULAR; INTRAVENOUS ONCE
Status: COMPLETED | OUTPATIENT
Start: 2024-08-18 | End: 2024-08-18

## 2024-08-18 RX ORDER — SODIUM CHLORIDE, SODIUM LACTATE, POTASSIUM CHLORIDE, CALCIUM CHLORIDE 600; 310; 30; 20 MG/100ML; MG/100ML; MG/100ML; MG/100ML
100 INJECTION, SOLUTION INTRAVENOUS CONTINUOUS
Status: DISCONTINUED | OUTPATIENT
Start: 2024-08-18 | End: 2024-08-18 | Stop reason: HOSPADM

## 2024-08-18 RX ORDER — LABETALOL HYDROCHLORIDE 5 MG/ML
INJECTION, SOLUTION INTRAVENOUS AS NEEDED
Status: DISCONTINUED | OUTPATIENT
Start: 2024-08-18 | End: 2024-08-18

## 2024-08-18 RX ORDER — HYDROMORPHONE HYDROCHLORIDE 1 MG/ML
0.2 INJECTION, SOLUTION INTRAMUSCULAR; INTRAVENOUS; SUBCUTANEOUS EVERY 5 MIN PRN
Status: DISCONTINUED | OUTPATIENT
Start: 2024-08-18 | End: 2024-08-18 | Stop reason: HOSPADM

## 2024-08-18 RX ORDER — ONDANSETRON 4 MG/1
4 TABLET, FILM COATED ORAL EVERY 8 HOURS PRN
Status: DISCONTINUED | OUTPATIENT
Start: 2024-08-18 | End: 2024-08-20 | Stop reason: HOSPADM

## 2024-08-18 RX ORDER — OXYCODONE HYDROCHLORIDE 5 MG/1
5 TABLET ORAL EVERY 4 HOURS PRN
Status: DISCONTINUED | OUTPATIENT
Start: 2024-08-18 | End: 2024-08-20 | Stop reason: HOSPADM

## 2024-08-18 RX ORDER — ROCURONIUM BROMIDE 10 MG/ML
INJECTION, SOLUTION INTRAVENOUS AS NEEDED
Status: DISCONTINUED | OUTPATIENT
Start: 2024-08-18 | End: 2024-08-18

## 2024-08-18 RX ORDER — NALOXONE HYDROCHLORIDE 0.4 MG/ML
0.2 INJECTION, SOLUTION INTRAMUSCULAR; INTRAVENOUS; SUBCUTANEOUS EVERY 5 MIN PRN
Status: DISCONTINUED | OUTPATIENT
Start: 2024-08-18 | End: 2024-08-20 | Stop reason: HOSPADM

## 2024-08-18 RX ORDER — ACETAMINOPHEN 325 MG/1
650 TABLET ORAL EVERY 6 HOURS SCHEDULED
Status: DISCONTINUED | OUTPATIENT
Start: 2024-08-18 | End: 2024-08-20 | Stop reason: HOSPADM

## 2024-08-18 RX ORDER — TOBRAMYCIN 1.2 G/30ML
INJECTION, POWDER, LYOPHILIZED, FOR SOLUTION INTRAVENOUS AS NEEDED
Status: DISCONTINUED | OUTPATIENT
Start: 2024-08-18 | End: 2024-08-18 | Stop reason: HOSPADM

## 2024-08-18 RX ORDER — CETIRIZINE HYDROCHLORIDE 10 MG/1
10 TABLET ORAL DAILY
Status: DISCONTINUED | OUTPATIENT
Start: 2024-08-18 | End: 2024-08-20 | Stop reason: HOSPADM

## 2024-08-18 RX ORDER — LIDOCAINE HYDROCHLORIDE 20 MG/ML
INJECTION, SOLUTION INFILTRATION; PERINEURAL AS NEEDED
Status: DISCONTINUED | OUTPATIENT
Start: 2024-08-18 | End: 2024-08-18

## 2024-08-18 RX ORDER — MIDAZOLAM HYDROCHLORIDE 1 MG/ML
INJECTION INTRAMUSCULAR; INTRAVENOUS AS NEEDED
Status: DISCONTINUED | OUTPATIENT
Start: 2024-08-18 | End: 2024-08-18

## 2024-08-18 RX ORDER — DOCUSATE SODIUM 100 MG/1
100 CAPSULE, LIQUID FILLED ORAL 2 TIMES DAILY
Status: DISCONTINUED | OUTPATIENT
Start: 2024-08-18 | End: 2024-08-20 | Stop reason: HOSPADM

## 2024-08-18 RX ORDER — HYDROMORPHONE HYDROCHLORIDE 1 MG/ML
INJECTION, SOLUTION INTRAMUSCULAR; INTRAVENOUS; SUBCUTANEOUS AS NEEDED
Status: DISCONTINUED | OUTPATIENT
Start: 2024-08-18 | End: 2024-08-18

## 2024-08-18 RX ORDER — SODIUM CHLORIDE, SODIUM LACTATE, POTASSIUM CHLORIDE, CALCIUM CHLORIDE 600; 310; 30; 20 MG/100ML; MG/100ML; MG/100ML; MG/100ML
INJECTION, SOLUTION INTRAVENOUS CONTINUOUS PRN
Status: DISCONTINUED | OUTPATIENT
Start: 2024-08-18 | End: 2024-08-18

## 2024-08-18 RX ORDER — CYCLOBENZAPRINE HCL 10 MG
10 TABLET ORAL 3 TIMES DAILY PRN
Status: DISCONTINUED | OUTPATIENT
Start: 2024-08-18 | End: 2024-08-20 | Stop reason: HOSPADM

## 2024-08-18 RX ORDER — CEFAZOLIN SODIUM 2 G/100ML
2 INJECTION, SOLUTION INTRAVENOUS EVERY 8 HOURS
Status: COMPLETED | OUTPATIENT
Start: 2024-08-18 | End: 2024-08-19

## 2024-08-18 RX ORDER — METOPROLOL TARTRATE 1 MG/ML
INJECTION, SOLUTION INTRAVENOUS AS NEEDED
Status: DISCONTINUED | OUTPATIENT
Start: 2024-08-18 | End: 2024-08-18

## 2024-08-18 RX ORDER — LABETALOL HYDROCHLORIDE 5 MG/ML
5 INJECTION, SOLUTION INTRAVENOUS ONCE AS NEEDED
Status: DISCONTINUED | OUTPATIENT
Start: 2024-08-18 | End: 2024-08-18 | Stop reason: HOSPADM

## 2024-08-18 RX ORDER — VANCOMYCIN HYDROCHLORIDE 1 G/20ML
INJECTION, POWDER, LYOPHILIZED, FOR SOLUTION INTRAVENOUS AS NEEDED
Status: DISCONTINUED | OUTPATIENT
Start: 2024-08-18 | End: 2024-08-18 | Stop reason: HOSPADM

## 2024-08-18 RX ORDER — PROPOFOL 10 MG/ML
INJECTION, EMULSION INTRAVENOUS AS NEEDED
Status: DISCONTINUED | OUTPATIENT
Start: 2024-08-18 | End: 2024-08-18

## 2024-08-18 RX ORDER — SODIUM CHLORIDE 0.9 G/100ML
IRRIGANT IRRIGATION AS NEEDED
Status: DISCONTINUED | OUTPATIENT
Start: 2024-08-18 | End: 2024-08-18 | Stop reason: HOSPADM

## 2024-08-18 RX ORDER — ACETAMINOPHEN 325 MG/1
650 TABLET ORAL EVERY 4 HOURS PRN
Status: DISCONTINUED | OUTPATIENT
Start: 2024-08-18 | End: 2024-08-20 | Stop reason: HOSPADM

## 2024-08-18 RX ORDER — KETOROLAC TROMETHAMINE 30 MG/ML
INJECTION, SOLUTION INTRAMUSCULAR; INTRAVENOUS AS NEEDED
Status: DISCONTINUED | OUTPATIENT
Start: 2024-08-18 | End: 2024-08-18

## 2024-08-18 RX ADMIN — PROPOFOL 200 MG: 10 INJECTION, EMULSION INTRAVENOUS at 07:43

## 2024-08-18 RX ADMIN — MAGNESIUM SULFATE HEPTAHYDRATE 2 G: 500 INJECTION, SOLUTION INTRAMUSCULAR; INTRAVENOUS at 09:35

## 2024-08-18 RX ADMIN — ACETAMINOPHEN 650 MG: 325 TABLET ORAL at 13:45

## 2024-08-18 RX ADMIN — HYDROMORPHONE HYDROCHLORIDE 0.5 MG: 1 INJECTION, SOLUTION INTRAMUSCULAR; INTRAVENOUS; SUBCUTANEOUS at 10:00

## 2024-08-18 RX ADMIN — LABETALOL HYDROCHLORIDE 10 MG: 5 INJECTION, SOLUTION INTRAVENOUS at 08:08

## 2024-08-18 RX ADMIN — METOROPROLOL TARTRATE 5 MG: 5 INJECTION, SOLUTION INTRAVENOUS at 07:48

## 2024-08-18 RX ADMIN — CEFAZOLIN SODIUM 2 G: 2 INJECTION, SOLUTION INTRAVENOUS at 23:32

## 2024-08-18 RX ADMIN — MIDAZOLAM HYDROCHLORIDE 2 MG: 1 INJECTION, SOLUTION INTRAMUSCULAR; INTRAVENOUS at 07:35

## 2024-08-18 RX ADMIN — CYCLOBENZAPRINE 10 MG: 10 TABLET, FILM COATED ORAL at 03:43

## 2024-08-18 RX ADMIN — SODIUM CHLORIDE, POTASSIUM CHLORIDE, SODIUM LACTATE AND CALCIUM CHLORIDE: 600; 310; 30; 20 INJECTION, SOLUTION INTRAVENOUS at 07:12

## 2024-08-18 RX ADMIN — OXYCODONE HYDROCHLORIDE 10 MG: 5 TABLET ORAL at 02:16

## 2024-08-18 RX ADMIN — TRANEXAMIC ACID 1000 MG: 100 INJECTION INTRAVENOUS at 07:49

## 2024-08-18 RX ADMIN — ROCURONIUM BROMIDE 50 MG: 10 INJECTION INTRAVENOUS at 07:43

## 2024-08-18 RX ADMIN — OXYCODONE HYDROCHLORIDE 10 MG: 5 TABLET ORAL at 06:15

## 2024-08-18 RX ADMIN — OXYCODONE HYDROCHLORIDE 10 MG: 5 TABLET ORAL at 21:21

## 2024-08-18 RX ADMIN — ACETAMINOPHEN 650 MG: 325 TABLET ORAL at 17:31

## 2024-08-18 RX ADMIN — HYDROMORPHONE HYDROCHLORIDE 0.5 MG: 1 INJECTION, SOLUTION INTRAMUSCULAR; INTRAVENOUS; SUBCUTANEOUS at 08:35

## 2024-08-18 RX ADMIN — LIDOCAINE HYDROCHLORIDE 100 MG: 20 INJECTION, SOLUTION INFILTRATION; PERINEURAL at 07:43

## 2024-08-18 RX ADMIN — ROCURONIUM BROMIDE 20 MG: 10 INJECTION INTRAVENOUS at 08:35

## 2024-08-18 RX ADMIN — HYDROMORPHONE HYDROCHLORIDE 0.5 MG: 1 INJECTION, SOLUTION INTRAMUSCULAR; INTRAVENOUS; SUBCUTANEOUS at 10:38

## 2024-08-18 RX ADMIN — TRANEXAMIC ACID 1000 MG: 100 INJECTION INTRAVENOUS at 09:15

## 2024-08-18 RX ADMIN — ACETAMINOPHEN 650 MG: 325 TABLET ORAL at 02:16

## 2024-08-18 RX ADMIN — OXYCODONE HYDROCHLORIDE 10 MG: 5 TABLET ORAL at 10:55

## 2024-08-18 RX ADMIN — METHOCARBAMOL 1000 MG: 100 INJECTION INTRAMUSCULAR; INTRAVENOUS at 10:16

## 2024-08-18 RX ADMIN — HYDROMORPHONE HYDROCHLORIDE 0.5 MG: 1 INJECTION, SOLUTION INTRAMUSCULAR; INTRAVENOUS; SUBCUTANEOUS at 10:10

## 2024-08-18 RX ADMIN — CEFAZOLIN 2 G: 1 INJECTION, POWDER, FOR SOLUTION INTRAMUSCULAR; INTRAVENOUS at 07:54

## 2024-08-18 RX ADMIN — HYDROMORPHONE HYDROCHLORIDE 0.3 MG: 1 INJECTION, SOLUTION INTRAMUSCULAR; INTRAVENOUS; SUBCUTANEOUS at 09:22

## 2024-08-18 RX ADMIN — OXYCODONE HYDROCHLORIDE 10 MG: 5 TABLET ORAL at 17:31

## 2024-08-18 RX ADMIN — HYDROMORPHONE HYDROCHLORIDE 0.5 MG: 1 INJECTION, SOLUTION INTRAMUSCULAR; INTRAVENOUS; SUBCUTANEOUS at 10:22

## 2024-08-18 RX ADMIN — SUGAMMADEX 200 MG: 100 INJECTION, SOLUTION INTRAVENOUS at 09:15

## 2024-08-18 RX ADMIN — HYDROMORPHONE HYDROCHLORIDE 0.2 MG: 1 INJECTION, SOLUTION INTRAMUSCULAR; INTRAVENOUS; SUBCUTANEOUS at 09:24

## 2024-08-18 RX ADMIN — ONDANSETRON 4 MG: 2 INJECTION INTRAMUSCULAR; INTRAVENOUS at 09:13

## 2024-08-18 RX ADMIN — ACETAMINOPHEN 650 MG: 325 TABLET ORAL at 23:32

## 2024-08-18 RX ADMIN — KETOROLAC TROMETHAMINE 30 MG: 30 INJECTION, SOLUTION INTRAMUSCULAR; INTRAVENOUS at 08:37

## 2024-08-18 RX ADMIN — FENTANYL CITRATE 50 MCG: 50 INJECTION, SOLUTION INTRAMUSCULAR; INTRAVENOUS at 07:43

## 2024-08-18 RX ADMIN — FENTANYL CITRATE 50 MCG: 50 INJECTION, SOLUTION INTRAMUSCULAR; INTRAVENOUS at 07:35

## 2024-08-18 SDOH — SOCIAL STABILITY: SOCIAL INSECURITY: DO YOU FEEL UNSAFE GOING BACK TO THE PLACE WHERE YOU ARE LIVING?: NO

## 2024-08-18 SDOH — HEALTH STABILITY: MENTAL HEALTH: CURRENT SMOKER: 1

## 2024-08-18 SDOH — SOCIAL STABILITY: SOCIAL INSECURITY: HAVE YOU HAD THOUGHTS OF HARMING ANYONE ELSE?: NO

## 2024-08-18 SDOH — SOCIAL STABILITY: SOCIAL INSECURITY: HAVE YOU HAD ANY THOUGHTS OF HARMING ANYONE ELSE?: NO

## 2024-08-18 SDOH — SOCIAL STABILITY: SOCIAL INSECURITY: ARE YOU OR HAVE YOU BEEN THREATENED OR ABUSED PHYSICALLY, EMOTIONALLY, OR SEXUALLY BY ANYONE?: NO

## 2024-08-18 SDOH — SOCIAL STABILITY: SOCIAL INSECURITY: ABUSE: ADULT

## 2024-08-18 SDOH — SOCIAL STABILITY: SOCIAL INSECURITY: DO YOU FEEL ANYONE HAS EXPLOITED OR TAKEN ADVANTAGE OF YOU FINANCIALLY OR OF YOUR PERSONAL PROPERTY?: NO

## 2024-08-18 SDOH — SOCIAL STABILITY: SOCIAL INSECURITY: DOES ANYONE TRY TO KEEP YOU FROM HAVING/CONTACTING OTHER FRIENDS OR DOING THINGS OUTSIDE YOUR HOME?: NO

## 2024-08-18 SDOH — SOCIAL STABILITY: SOCIAL INSECURITY: ARE THERE ANY APPARENT SIGNS OF INJURIES/BEHAVIORS THAT COULD BE RELATED TO ABUSE/NEGLECT?: NO

## 2024-08-18 SDOH — SOCIAL STABILITY: SOCIAL INSECURITY: HAS ANYONE EVER THREATENED TO HURT YOUR FAMILY OR YOUR PETS?: NO

## 2024-08-18 ASSESSMENT — PAIN SCALES - GENERAL
PAINLEVEL_OUTOF10: 9
PAINLEVEL_OUTOF10: 10 - WORST POSSIBLE PAIN
PAINLEVEL_OUTOF10: 9
PAINLEVEL_OUTOF10: 10 - WORST POSSIBLE PAIN
PAINLEVEL_OUTOF10: 9
PAINLEVEL_OUTOF10: 8
PAINLEVEL_OUTOF10: 10 - WORST POSSIBLE PAIN

## 2024-08-18 ASSESSMENT — COGNITIVE AND FUNCTIONAL STATUS - GENERAL
STANDING UP FROM CHAIR USING ARMS: A LITTLE
HELP NEEDED FOR BATHING: A LITTLE
TOILETING: A LITTLE
WALKING IN HOSPITAL ROOM: A LITTLE
DRESSING REGULAR LOWER BODY CLOTHING: A LOT
MOBILITY SCORE: 21
CLIMB 3 TO 5 STEPS WITH RAILING: A LOT
CLIMB 3 TO 5 STEPS WITH RAILING: A LOT
WALKING IN HOSPITAL ROOM: A LOT
MOBILITY SCORE: 18
PATIENT BASELINE BEDBOUND: NO
MOVING TO AND FROM BED TO CHAIR: A LITTLE
DAILY ACTIVITIY SCORE: 20

## 2024-08-18 ASSESSMENT — ACTIVITIES OF DAILY LIVING (ADL)
WALKS IN HOME: NEEDS ASSISTANCE
JUDGMENT_ADEQUATE_SAFELY_COMPLETE_DAILY_ACTIVITIES: YES
GROOMING: INDEPENDENT
HEARING - LEFT EAR: FUNCTIONAL
TOILETING: NEEDS ASSISTANCE
HEARING - RIGHT EAR: FUNCTIONAL
FEEDING YOURSELF: INDEPENDENT
PATIENT'S MEMORY ADEQUATE TO SAFELY COMPLETE DAILY ACTIVITIES?: YES
ADEQUATE_TO_COMPLETE_ADL: YES
DRESSING YOURSELF: NEEDS ASSISTANCE
BATHING: NEEDS ASSISTANCE

## 2024-08-18 ASSESSMENT — PAIN - FUNCTIONAL ASSESSMENT
PAIN_FUNCTIONAL_ASSESSMENT: 0-10
PAIN_FUNCTIONAL_ASSESSMENT: UNABLE TO SELF-REPORT
PAIN_FUNCTIONAL_ASSESSMENT: 0-10
PAIN_FUNCTIONAL_ASSESSMENT: 0-10
PAIN_FUNCTIONAL_ASSESSMENT: UNABLE TO SELF-REPORT
PAIN_FUNCTIONAL_ASSESSMENT: UNABLE TO SELF-REPORT

## 2024-08-18 ASSESSMENT — LIFESTYLE VARIABLES
HAVE YOU OR SOMEONE ELSE BEEN INJURED AS A RESULT OF YOUR DRINKING: NO
PRESCIPTION_ABUSE_PAST_12_MONTHS: NO
HOW OFTEN DURING THE LAST YEAR HAVE YOU NEEDED AN ALCOHOLIC DRINK FIRST THING IN THE MORNING TO GET YOURSELF GOING AFTER A NIGHT OF HEAVY DRINKING: NEVER
SUBSTANCE_ABUSE_PAST_12_MONTHS: YES
AUDIT-C TOTAL SCORE: 9
HOW OFTEN DO YOU HAVE A DRINK CONTAINING ALCOHOL: 4 OR MORE TIMES A WEEK
HOW OFTEN DO YOU HAVE 6 OR MORE DRINKS ON ONE OCCASION: WEEKLY
AUDIT TOTAL SCORE: 3
HOW OFTEN DURING THE LAST YEAR HAVE YOU FOUND THAT YOU WERE NOT ABLE TO STOP DRINKING ONCE YOU HAD STARTED: LESS THAN MONTHLY
HOW OFTEN DURING THE LAST YEAR HAVE YOU FAILED TO DO WHAT WAS NORMALLY EXPECTED FROM YOU BECAUSE OF DRINKING: LESS THAN MONTHLY
HAS A RELATIVE, FRIEND, DOCTOR, OR ANOTHER HEALTH PROFESSIONAL EXPRESSED CONCERN ABOUT YOUR DRINKING OR SUGGESTED YOU CUT DOWN: NO
HOW OFTEN DURING THE LAST YEAR HAVE YOU HAD A FEELING OF GUILT OR REMORSE AFTER DRINKING: LESS THAN MONTHLY
AUDIT-C TOTAL SCORE: 9
HOW OFTEN DURING THE LAST YEAR HAVE YOU BEEN UNABLE TO REMEMBER WHAT HAPPENED THE NIGHT BEFORE BECAUSE YOU HAD BEEN DRINKING: NEVER
AUDIT TOTAL SCORE: 12
HOW MANY STANDARD DRINKS CONTAINING ALCOHOL DO YOU HAVE ON A TYPICAL DAY: 5 OR 6
SKIP TO QUESTIONS 9-10: 0

## 2024-08-18 ASSESSMENT — COLUMBIA-SUICIDE SEVERITY RATING SCALE - C-SSRS
2. HAVE YOU ACTUALLY HAD ANY THOUGHTS OF KILLING YOURSELF?: NO
1. IN THE PAST MONTH, HAVE YOU WISHED YOU WERE DEAD OR WISHED YOU COULD GO TO SLEEP AND NOT WAKE UP?: NO
6. HAVE YOU EVER DONE ANYTHING, STARTED TO DO ANYTHING, OR PREPARED TO DO ANYTHING TO END YOUR LIFE?: NO

## 2024-08-18 ASSESSMENT — PAIN DESCRIPTION - DESCRIPTORS: DESCRIPTORS: THROBBING

## 2024-08-18 ASSESSMENT — PATIENT HEALTH QUESTIONNAIRE - PHQ9
SUM OF ALL RESPONSES TO PHQ9 QUESTIONS 1 & 2: 0
2. FEELING DOWN, DEPRESSED OR HOPELESS: NOT AT ALL
1. LITTLE INTEREST OR PLEASURE IN DOING THINGS: NOT AT ALL

## 2024-08-18 NOTE — PROGRESS NOTES
"Orthopaedic Surgery Progress Note    S:  Seen in pacu postop. Resting comfortably    O:  BP (!) 174/102   Pulse 70   Temp 36.8 °C (98.2 °F) (Temporal)   Resp 18   Ht 1.676 m (5' 6\")   Wt 111 kg (245 lb)   LMP 08/04/2024 (Approximate)   SpO2 100%   BMI 39.54 kg/m²     Gen: arousable, NAD, appropriately conversational  Cardiac: RRR to peripheral palpation  Resp: nonlabored on RA  GI: soft, nondistended    MSK:  Right Lower Extremity:   -postoperative splint CDI w/o strikethru  -Tender at site of injury with painful ROM.  -spontaneously wiggles toes  - full sensory could not be performed 2/2 recent anesthesia  -Foot warm, well perfused  -Compartments soft and compressible      A/P: 33 y.o. female  presents w/ R trimal ankle fx dislocation now s/p R ankle ORIF on 8/18/2024 with Dr. Blcakburn.  Doing well postop.    Plan:  - admit to tele postop for cardiac monitoring as patient went into afib briefly  - medicine consult for intra-op afib  - Weight bearing: NWB RLE in SLS  - DVT ppx: SCDs, ASA 81 BID  - Diet: Regular  - Pain: Tylenol, oxycodone 5/10  - Antibiotics: perioperative ancef 2g q8hr x3 doses  - FEN: HLIV with good PO intake  - Bowel Regimen: Colace, senna, dulcolax  - PT/OT  - Pulm: Encourage IS  - Continue home medications  - No vazquez    Dispo: pending PT    Hai Dunham MD  Orthopedic Surgery, PGY4  Available by Epic Message    While inpatient, this patient will be followed by the Orthopaedic Trauma team. Please see contact information below:    Everett Arenas MD PGY2  Jason Rhodes MD PGY4    "

## 2024-08-18 NOTE — OP NOTE
Open Reduction Internal Fixation Ankle (R) Operative Note     Date: 2024  OR Location: [facility] OR    Name: Rita Mckeon, : 1990, Age: 33 y.o., MRN: [id number], Sex: female    Diagnosis  Pre-op Diagnosis      * Trimalleolar fracture of ankle, closed, right, initial encounter [S82.851A] Post-op Diagnosis     * Trimalleolar fracture of ankle, closed, right, initial encounter [S82.851A]     Procedures  Open reduction internal fixation of a right trimalleolar ankle fracture dislocation  Open reduction internal fixation of the right ankle syndesmosis  Stress view under anesthesia, right ankle  Ankle arthritis with removal of loose body, right ankle      Surgeons      * Eveertt Blackburn - Primary    Resident/Fellow/Other Assistant:  Surgeons and Role:     * Panfilo Geller DO - Resident - Assisting    Procedure Summary  Anesthesia: General  ASA: III  Anesthesia Staff: Anesthesiologist: Myrtle Seinor MD  CRNA: NETTIE Funk-CRNA  Estimated Blood Loss: 15 mL  Intra-op Medications:   Administrations occurring from 0800 to 1030 on 24:   Medication Name Total Dose   vancomycin (Vancocin) vial for injection 1 g   tobramycin (Nebcin) injection 1.2 mg   sodium chloride 0.9 % irrigation solution 1,000 mL   methocarbamol (Robaxin) injection 1,000 mg 1,000 mg   HYDROmorphone (Dilaudid) injection 0.5 mg 1.5 mg              Anesthesia Record               Intraprocedure I/O Totals          Intake    Tranexamic Acid 0.00 mL    The total shown is the total volume documented since Anesthesia Start was filed.    LR infusion 800.00 mL    Total Intake 800 mL       Output    Est. Blood Loss 10 mL    Total Output 10 mL       Net    Net Volume 790 mL          Specimen: No specimens collected     Staff:   Scrub Person: Ree  Circulator: Baldemar         Drains and/or Catheters: * None in log *    Tourniquet Times:   * Missing tourniquet times found for documented tourniquets in lo *      Implants:  Implants       Type Name Action Serial No.      Screw CANNULATED SCREW 0.4 MM X 34MM Implanted N/A     Screw cannulated screw 2.5mm x 34mm Implanted      Implant DEVICE, SYNDEMOSIS FIXATION, GRAVITY SYNCHFIX  P5 - [id number] Implanted      Screw SCREW, VIKASH 3.5 X 14 TI - [id number] Implanted      Screw SCREW, VIKASH 3.5 X 16 TI - [id number] Implanted      Screw SCREW, VIKASH 3.5 X 18 TI - [id number] Implanted       2.7 Cortex Screws Implanted      Screw 2.7 Cortex Screws Implanted      Screw 2.7 cortex screws Implanted      Screw 2.7 Cortex Screws Implanted      Screw 2.7 Cortex Screws Implanted      Screw 2.7 Cortex Screws Implanted      Screw 2.7 Cortex Screws Implanted      Plate 6 Hole Fibula Plate Implanted               Findings: Dis    Indications: Rita Mckeon is an 33 y.o. female who is having surgery for Trimalleolar fracture of ankle, closed, right, initial encounter [S82.851A].    The patient was seen in the preoperative area. The risks, benefits, complications, treatment options, non-operative alternatives, expected recovery and outcomes were discussed with the patient. The possibilities of reaction to medication, pulmonary aspiration, injury to surrounding structures, bleeding, recurrent infection, the need for additional procedures, failure to diagnose a condition, and creating a complication requiring transfusion or operation were discussed with the patient. The patient concurred with the proposed plan, giving informed consent.  The site of surgery was properly noted/marked if necessary per policy. The patient has been actively warmed in preoperative area. Preoperative antibiotics have been ordered and given within 1 hours of incision. Venous thrombosis prophylaxis have been ordered including unilateral sequential compression device    Procedure Details: The patient was taken back to the operating room where initial timeout was performed including patient's name, date of birth,  MRN, procedure to be performed, and correct laterality which were agreed upon by the entire surgical staff. She was subsequently transitioned to the operating room table where she was placed in the supine position. On the operating table, the patient was intubated as per anesthesia protocol. All bony prominences were well padded. The patient was left in the supine position, a non-sterile tourniquet was placed around the operative thigh, and bone foam was placed under the leg. A preliminary alcohol scrub was performed. This was followed by a chlorhexidine surgical prep the patient was draped in the usual sterile fashion. Prior to incision, the right leg was elevated and the tourniquet was inflated to 300 mmHg.     Two incisions were marked out, one along the lateral malleolus and distal fibula and a second along the medial malleolus. Using a 10 blade, a 10 cm incision was made through subcutaneous tissue along the lateral aspect of the ankle centered over the lateral mal. Bovie electrocautery was then used for the rest of the dissection while obtaining hemostasis during our approach. Careful attention was paid to not injure the superficial peroneal nerve during our exposure. The underlying muscle was gently elevated off the fracture site proximally and distally and a gentle debridement of the fracture hematoma was performed. Using lobster claw reduction clamps, we reduced our lateral malleolus under fluoroscopic guidance. From there, using a 2.5 mm cortical screw, we lagged the lateral malleolus. We then placed a second 2.0 mm positional screw proximal to our lag screw.    Next, we turned our attention to the posterior malleolus. Under fluoroscopic guidance, we placed a 2.0 smooth K wire aiming anterior to posterior along the tibia and medial to lateral to capture our posterior malleolus fragment. This was sent through the posterior side of the ankle and through the skin. We then made a small incision about this  smooth K wire. We measured our screw length and used an appropriate sized cannulated screw in a posterior to anterior trajectory.    Next, we turned our attention back to the lateral malleolus. An appropriately sized placed was placed and a combination of locking screws distally and non-locking screws proximally were utilized.    Then, a sharp, 5 cm curivlinear incision was made over the medial mal through subcutaneous tissue. Bovie electrocautery was used to dissect further and elevate soft tissue off the displaced medial mal fragment. We paid careful attention to not injure the great saphenous vein. The fracture was reduced with a pointed reduction clamp and an appropriately sized cannulated screw was used for fixation.    Finally, we turned our attention to the syndesmosis. The syndesmosis was stressed under fluoroscopic imaging using a combination of ankle dorsiflexion and external rotation. We noticed that there was increased space between the distal tibia and fibula as well as the medial clear spaced. The decision was made to stabilize the syndesmosis. Using the prior lateral-based incision, two Synchfix devices were placed through the lateral plate and out the medial aspect of the distal tibia. Each device was secured appropriately. Upon further fluoroscopic assessment we noticed a loose body anteriorly within the ankle joint. This was removed. Final fluoroscopic images were obtained. We were happy with our final reduction and construct. All wounds were irrigated, vancomycin powder was placed into each wound, and a layer closure was performed using a combination of 0-vicryl, 2-0 vicryl, and 3-0 nylons. Xeroform, fluffs, webril and a short-leg splint were placed. The patient was extubated without complication and transferred to her [facility]. At the completion of the case, all counts were correct.     Complications:  None; patient tolerated the procedure well.    Disposition: PACU - hemodynamically  stable.  Condition: stable     Additional Details: The patient will be transferred to the telemetry floor for at least 24 hours postoperative as she went into atrial fibrillation intraoperatively per anesthesia. She will be non weight bearing on her operative extremity in a short leg splint. She will start dvt prophylaxis tomorrow in the form of aspirin 81 BID. She will receive 24 hours of antibiotic prophylaxis.    Attending Attestation: I was present and scrubbed for the entire procedure.    Everett Blackburn  Phone Number: [phone]

## 2024-08-18 NOTE — BRIEF OP NOTE
Date: 2024 - 2024  OR Location: [facility] OR    Name: Rita Mckeon YOB: 1990, Age: 33 y.o., MRN: [id number], Sex: female    Diagnosis  Pre-op Diagnosis      * Trimalleolar fracture of ankle, closed, right, initial encounter [S82.851A] Post-op Diagnosis     * Trimalleolar fracture of ankle, closed, right, initial encounter [S82.851A]     Procedures  Open Reduction Internal Fixation Ankle  72213 - MI OPEN TX TRIMALLEOLAR ANKLE FX W/FIXJ PST LIP      Surgeons      * Everett Blackburn - Primary    Resident/Fellow/Other Assistant:  Surgeons and Role:     * Panfilo Geller DO - Resident - Assisting    Procedure Summary  Anesthesia: General  ASA: III  Anesthesia Staff: Anesthesiologist: Myrtle Senior MD  CRNA: NETTIE Funk-CRNA  Estimated Blood Loss: 15 mL  Intra-op Medications:   Administrations occurring from 0800 to 1030 on 24:   Medication Name Total Dose   vancomycin (Vancocin) vial for injection 1 g   tobramycin (Nebcin) injection 1.2 mg   sodium chloride 0.9 % irrigation solution 1,000 mL              Anesthesia Record               Intraprocedure I/O Totals          Intake    Tranexamic Acid 0.00 mL    The total shown is the total volume documented since Anesthesia Start was filed.    LR infusion 800.00 mL    Total Intake 800 mL          Specimen: No specimens collected     Staff:   Scrub Person: Ree  Circulator: Baldemar          Findings: Consistent with preop diagnosis    Complications:  None; patient tolerated the procedure well.     Disposition: PACU - hemodynamically stable.  Condition: stable  Specimens Collected: No specimens collected  Attending Attestation: I was present and scrubbed for the entire procedure.    Everett Blackburn  Phone Number: [phone]

## 2024-08-18 NOTE — NURSING NOTE
"1200  RN Fatoumata and RN Trainee Garima asked me to obtain telemetry leads and box set up for this transfer from T6.      Throughout shift, we were unable to connect patient successfully to telemetry monitor and kept getting the error message \"leadset unplugged\".  Attempted to change out tele box, tele cords and put in new batteries without success.      1730  RN Manager Mikayla GOLDSTEIN. Advised via text and provided additional suggestions to fix error message, however, we were unsuccessful.      1800  Ortho Dr. Bceky Arciniega came to unit to speak to me as Charge and I advised her that we were working on it.      1815  Called T9 Charge RN for assistance and she was also unable to fix error message.      1820  Called Unit Manager again Mikayla GOLDSTEIN and she advised us to call [employer] Ext. [phone] to come fix tele monitor.      Currently patient has on the assigned box for room 8058 - Box [id number] / #[id number].  "

## 2024-08-18 NOTE — H&P
Orthopaedic Problems/Injuries: R trimal ankle fx dislocation  HPI: 33F (obese, HTN) p/w above after GLF.     Location: Painful at R ankle  Duration: Pain has been persistent since fall  Severity: 8/10  Worsened by movement/Palpation, improved with rest and pain medication  Open/Closed: closed, NVI: intact  Associated symptoms: no associated numbness/tingling/weakness    Other Injuries: none  NPO: MN for OR    Past medical history: per HPI; no history of blood clots  Past surgical history: per HPI, rest reviewed in EMR  Allergies: per eMR  Medications: per EMR  Anticoagulation: denies  Social History: yes smoking, yes drinking, denies IVDU  Ambulatory status: independent  Family History:  Non-contributory to this patient's acute surgical issue.    12-point review of systems is negative other than what is mentioned above.    Constitutional: NAD, resting comfortably in bed  Skin: Warm and dry, no rashes   Eyes: EOMI, clear sclera   ENMT: MMM   HEENT: Neck supple without apparent injury, EOMI, MMM  Respiratory: NWOB on RA   CV: RRR per peripheral pulses, limbs wwp  GI: soft, non-distended   Lymph: No apparent LAD  Neuro: WILKINS spontaneously, CNs II - XII grossly intact   Psych: Appropriate mood and behavior   MSK:   RLE:   -Skin intact, tender at site of injury with painful ROM.  -Motor intact in DF/PF/EHL/FHL  -SILT in saph/sural/SPN/DPN distributions  -Foot wwp, 2+ DP/PT pulse, brisk cap refill  -Compartments soft and compressible, no pain with passive dorsiflexion    A full secondary survey was conducted. Patient did not have any acute pain with ROM or palpation of other extremities other than that which is mentioned below.    Imaging:  XR/CT: trimalleolar ankle fx w large posterior mal and displaced medial mal    Injury: R trimal ankle fx dislocation  HPI: 33F (obese, HTN) p/w above after GLF. Closed NVI. XR/CT w trimalleolar ankle fx w large posterior mal and displaced medial mal. Closed reduced under hematoma block  w concentric reduction but continued displacement of medial mal fragment. [facility]    Plan:  - Admit to orthopaedic surgery  - Consented and added to OR schedule for ORIF R ankle with orthopedic surgery on 8/18  - NPO at midnight for upcoming procedure.  - Prior to transfer to floor please obtain EKG, CXR, CBC, BMP, Coags, T+S, Pregnancy Test  - Please place vazquez catheter in setting of immobilizing fracture   - Strict Bedrest, NWB RLE in SLS   - Pre-operative ABx: None indicated    - No indication for pre-operative transfusion    - Tylenol, oxycodone, dilaudid for pain control  - LR @ 100 cc/hr when NPO.   - SCDs, no chemoppx in setting of upcoming surgery  - Maintain all tubes/lines/drains  - Continue home meds: as indicated    This consult was seen and staffed with attending surgeon, Dr. Blackburn within 30 minutes of initial consultation.     This patient will be followed by ortho trauma team (All Epic chat preferred):  1st call: Jason Torres PGY1  1st call: Gold Katz PGY1  2nd call: Cory Arenas PGY2  3rd call: Jason Rhodes PGY3    6pm-6am M-F, holidays, weekends please contact on-call resident @ [phone] w/ urgent questions/concerns.    Bolivar Nettles MD  Orthopedic Surgery, PGY-3  On-call Resident (on call pager [phone])

## 2024-08-18 NOTE — CARE PLAN
The patient's goals for the shift include      The clinical goals for the shift include        Problem: Pain - Adult  Goal: Verbalizes/displays adequate comfort level or baseline comfort level  Outcome: Progressing     Problem: Safety - Adult  Goal: Free from fall injury  Outcome: Progressing     Problem: Discharge Planning  Goal: Discharge to home or other facility with appropriate resources  Outcome: Progressing     Problem: Chronic Conditions and Co-morbidities  Goal: Patient's chronic conditions and co-morbidity symptoms are monitored and maintained or improved  Outcome: Progressing

## 2024-08-18 NOTE — ANESTHESIA POSTPROCEDURE EVALUATION
Patient: Rita Mckeon    Procedure Summary       Date: 08/18/24 Room / Location: [facility] OR 09 / [facility] OR    Anesthesia Start: 0737 Anesthesia Stop: 1002    Procedure: Open Reduction Internal Fixation Ankle (Right: Ankle) Diagnosis:       Trimalleolar fracture of ankle, closed, right, initial encounter      (Trimalleolar fracture of ankle, closed, right, initial encounter [S82.851A])    Surgeons: Everett Blackburn MD Responsible Provider: Myrtle Senior MD    Anesthesia Type: general ASA Status: 3            Anesthesia Type: general    Vitals Value Taken Time   /99 08/18/24 1115   Temp 36.5 °C (97.7 °F) 08/18/24 1115   Pulse 70 08/18/24 1115   Resp 15 08/18/24 1115   SpO2 97 % 08/18/24 1115       Anesthesia Post Evaluation    Patient location during evaluation: PACU  Patient participation: complete - patient participated  Level of consciousness: awake and alert  Pain management: adequate  Airway patency: patent  Cardiovascular status: acceptable and hemodynamically stable  Respiratory status: acceptable  Hydration status: acceptable  Postoperative Nausea and Vomiting: none    No notable events documented.

## 2024-08-18 NOTE — PROGRESS NOTES
Pharmacy Medication History Review    Rita Mckeon is a 33 y.o. female admitted for Trimalleolar fracture of ankle, closed, right, initial encounter. Pharmacy reviewed the patient's [id number] medications and allergies for accuracy.    The list below reflects the updated PTA list. Comments regarding how patient may be taking medications differently can be found in the Admit Orders Activity  Prior to Admission Medications   Prescriptions Last Dose Informant Patient Reported? Taking?   loratadine (Claritin) 10 mg tablet Not Taking Self No No   Sig: TAKE 1 TABLET BY MOUTH EVERYDAY AT BEDTIME   Patient not taking: Reported on 8/18/2024   prenatal vitamin, iron-folic, (Prenatal Vitamin) 27 mg iron-800 mcg folic acid tablet Not Taking Self No No   Sig: Take 1 tablet by mouth once daily.   Patient not taking: Reported on 8/18/2024      Facility-Administered Medications: None        The list below reflects the updated allergy list. Please review each documented allergy for additional clarification and justification.  Allergies  Reviewed by Paola REED RN on 8/17/2024   No Known Allergies         Patient accepts M2B at discharge. Pharmacy has been updated to [facility].    Sources used to complete the med history include   Patient Interview - good historian  Admission MedRec Grid  OARRS - none   EPIC medication dispense report    Medications ADDED:  None  Medications CHANGED:  None  Medications REMOVED:   None    Below are additional concerns with the patient's PTA list.  None    Charisse Angel, Elie   Transitions of Care Pharmacist  [facility]  Please reach out via Secure Chat for questions, or if no response call [email or web address] or [email or web address]

## 2024-08-18 NOTE — H&P
H&P reviewed. The patient was examined and there are no changes to the H&P. Patient electing to proceed with surgery. Patient consented and posted.    Bolivar Nettles MD  Orthopaedic Surgery PGY-3

## 2024-08-18 NOTE — ANESTHESIA PREPROCEDURE EVALUATION
Patient: Rita Mckeon    Procedure Information       Date/Time: 08/18/24 0800    Procedure: Open Reduction Internal Fixation Ankle (Right: Ankle)    Location: [facility] OR 09 / [facility]    Surgeons: Everett Blackburn MD        Rita Mckeon is a 33 y.o. female with PMH significant for hypertension presenting to the emergency department for right ankle pain.      Relevant Problems   Cardiac   (+) Chronic hypertension      Skin   (+) Eczema       Chemistry    Lab Results   Component Value Date/Time     08/17/2024 1859    K 3.8 08/17/2024 1859     (H) 08/17/2024 1859    CO2 18 (L) 08/17/2024 1859    BUN 10 08/17/2024 1859    CREATININE 0.78 08/17/2024 1859    Lab Results   Component Value Date/Time    CALCIUM 8.4 (L) 08/17/2024 1859    ALKPHOS 74 08/17/2024 1859    AST 20 08/17/2024 1859    ALT 13 08/17/2024 1859    BILITOT 0.4 08/17/2024 1859          Lab Results   Component Value Date/Time    WBC 12.4 (H) 08/17/2024 1859    HGB 12.1 08/17/2024 1859    HCT 38.6 08/17/2024 1859     08/17/2024 1859     Lab Results   Component Value Date/Time    PROTIME 13.8 (H) 08/17/2024 1859    INR 1.2 (H) 08/17/2024 1859     Encounter Date: 08/17/24   ECG 12 Lead   Result Value    Ventricular Rate 67    Atrial Rate 67    MS Interval 158    QRS Duration 70    QT Interval 400    QTC Calculation(Bazett) 422    P Axis 42    R Axis 36    T Axis 4    QRS Count 11    Q Onset 224    P Onset 145    P Offset 187    T Offset 424    QTC Fredericia 415    Narrative    Normal sinus rhythm  Nonspecific T wave abnormality  Abnormal ECG  No previous ECGs available  See ED provider note for full interpretation and clinical correlation  Confirmed by Gibran August ([id number]) on 8/17/2024 11:17:33 PM     No results found for this or any previous visit from the past 1095 days.   Clinical information reviewed:   Tobacco  Allergies  Meds   Med Hx  Surg Hx  OB Status  Fam Hx  Soc   Hx      Social  Connections: Not on file      NPO Detail:  No data recorded     Physical Exam    Airway  Mallampati: II  TM distance: >3 FB  Neck ROM: full     Cardiovascular    Dental        Pulmonary    Abdominal            Anesthesia Plan    History of general anesthesia?: yes  History of complications of general anesthesia?: no    ASA 3     general     The patient is a current smoker.    intravenous induction   Postoperative administration of opioids is intended.  Trial extubation is planned.  Anesthetic plan and risks discussed with patient.    Plan discussed with attending.

## 2024-08-18 NOTE — PROGRESS NOTES
Attending Attestation Note    Rita Mckeon is a 33 y.o. female who has a history of hypertension presenting with right ankle pain after tripping.  Adams that her bone popped out and she reduced it.  No skin wounds/laceration/abrasion  Prior to this and usual state of health.  Having significant pain at the right ankle unable to bear weight.  No other trauma    Exam  Visit Vitals  /89 (BP Location: Right arm, Patient Position: Lying)   Pulse 77   Temp 37 °C (98.6 °F) (Temporal)   Resp 18     Vital signs stable, nonacute distress.  Significant pain and swelling at the right ankle.  Medial malleolus and lateral malleolus are both tender.  Distal sensation of the foot is intact.  She is able to move toes.  DP 2+  Compartments are soft, no abrasion, laceration    MDM  33-year-old female history as above presenting with concern for right ankle fracture.  Will get x-ray.  Will consult orthopedics.  Nonweightbearing.  Pain control          MDM Complexity    EKG Independent Interpretation: See ED Course  Independent Result Review and Interpretation: See ED course  Chronic conditions affecting the patient's care: As documented in HPI, MDM, and ED course  The patient was discussed with the following consultants/services: See ED course      Eliseo Harvey MD  Emergency Medicine

## 2024-08-18 NOTE — ANESTHESIA PROCEDURE NOTES
Airway  Date/Time: 8/18/2024 7:45 AM  Urgency: elective    Airway not difficult    Staffing  Performed: CRNA   Authorized by: LINDA Funk    Performed by: LINDA Funk  Patient location during procedure: OR    Indications and Patient Condition  Indications for airway management: anesthesia  Spontaneous Ventilation: absent  Sedation level: deep  Preoxygenated: yes  Patient position: sniffing  Mask difficulty assessment: 2 - vent by mask + OA or adjuvant +/- NMBA    Final Airway Details  Final airway type: endotracheal airway      Successful airway: ETT  Cuffed: yes   Successful intubation technique: video laryngoscopy  Facilitating devices/methods: intubating stylet  Blade: Nidia  Blade size: #4  ETT size (mm): 7.0  Cormack-Lehane Classification: grade I - full view of glottis  Placement verified by: chest auscultation and capnometry   Measured from: lips  ETT to lips (cm): 22  Number of attempts at approach: 1  Number of other approaches attempted: 0

## 2024-08-18 NOTE — NURSING NOTE
1200 New pt on unit with tele orders. Attempted to connect 2 tele boxes changed leads tele not working    1630 Pt requesting to go home. Doesn't want to be here want to be with family and kids. Ortho made aware    1215 Notified charge nurse Purnima Charge nurse whoo attempted multiple times to reconnect and disconnect, change leads to see if telemetry would start working.  Also asked other staff on unit for help.     1939 Charge nurse spoke with unit manager for additional help as well as harini Arciniega and clinical engineering.

## 2024-08-19 ENCOUNTER — APPOINTMENT (OUTPATIENT)
Dept: CARDIOLOGY | Facility: HOSPITAL | Age: 34
End: 2024-08-19
Payer: COMMERCIAL

## 2024-08-19 ENCOUNTER — PATIENT MESSAGE (OUTPATIENT)
Dept: ORTHOPEDIC SURGERY | Facility: CLINIC | Age: 34
End: 2024-08-19

## 2024-08-19 ENCOUNTER — HOME HEALTH ADMISSION (OUTPATIENT)
Dept: HOME HEALTH SERVICES | Facility: HOME HEALTH | Age: 34
End: 2024-08-19
Payer: COMMERCIAL

## 2024-08-19 PROBLEM — I48.92 ATRIAL FIB/FLUTTER, TRANSIENT (MULTI): Status: ACTIVE | Noted: 2024-08-19

## 2024-08-19 PROBLEM — I48.91 ATRIAL FIB/FLUTTER, TRANSIENT (MULTI): Status: ACTIVE | Noted: 2024-08-19

## 2024-08-19 LAB
ALBUMIN SERPL BCP-MCNC: 4.2 G/DL (ref 3.4–5)
ANION GAP SERPL CALC-SCNC: 13 MMOL/L (ref 10–20)
AORTIC VALVE MEAN GRADIENT: 5 MMHG
AORTIC VALVE PEAK VELOCITY: 1.46 M/S
ATRIAL RATE: 60 BPM
AV PEAK GRADIENT: 8.5 MMHG
AVA (PEAK VEL): 2.77 CM2
AVA (VTI): 3.4 CM2
BUN SERPL-MCNC: 9 MG/DL (ref 6–23)
CALCIUM SERPL-MCNC: 9.4 MG/DL (ref 8.6–10.6)
CHLORIDE SERPL-SCNC: 105 MMOL/L (ref 98–107)
CO2 SERPL-SCNC: 23 MMOL/L (ref 21–32)
CREAT SERPL-MCNC: 0.92 MG/DL (ref 0.5–1.05)
EGFRCR SERPLBLD CKD-EPI 2021: 85 ML/MIN/1.73M*2
EJECTION FRACTION APICAL 4 CHAMBER: 61.2
EJECTION FRACTION: 73 %
GLUCOSE SERPL-MCNC: 111 MG/DL (ref 74–99)
LEFT ATRIUM VOLUME AREA LENGTH INDEX BSA: 23.3 ML/M2
LEFT VENTRICULAR OUTFLOW TRACT DIAMETER: 2.03 CM
MAGNESIUM SERPL-MCNC: 2.22 MG/DL (ref 1.6–2.4)
MITRAL VALVE E/A RATIO: 1.21
P AXIS: 42 DEGREES
P OFFSET: 185 MS
P ONSET: 143 MS
PHOSPHATE SERPL-MCNC: 3 MG/DL (ref 2.5–4.9)
POTASSIUM SERPL-SCNC: 3.4 MMOL/L (ref 3.5–5.3)
PR INTERVAL: 158 MS
Q ONSET: 222 MS
QRS COUNT: 10 BEATS
QRS DURATION: 66 MS
QT INTERVAL: 400 MS
QTC CALCULATION(BAZETT): 400 MS
QTC FREDERICIA: 400 MS
R AXIS: -4 DEGREES
RIGHT VENTRICLE FREE WALL PEAK S': 13.9 CM/S
SODIUM SERPL-SCNC: 138 MMOL/L (ref 136–145)
T AXIS: -3 DEGREES
T OFFSET: 422 MS
TRICUSPID ANNULAR PLANE SYSTOLIC EXCURSION: 1.9 CM
TSH SERPL-ACNC: 1.25 MIU/L (ref 0.44–3.98)
VENTRICULAR RATE: 60 BPM

## 2024-08-19 PROCEDURE — 99222 1ST HOSP IP/OBS MODERATE 55: CPT

## 2024-08-19 PROCEDURE — 93306 TTE W/DOPPLER COMPLETE: CPT

## 2024-08-19 PROCEDURE — 36415 COLL VENOUS BLD VENIPUNCTURE: CPT

## 2024-08-19 PROCEDURE — 2500000005 HC RX 250 GENERAL PHARMACY W/O HCPCS: Performed by: STUDENT IN AN ORGANIZED HEALTH CARE EDUCATION/TRAINING PROGRAM

## 2024-08-19 PROCEDURE — 80069 RENAL FUNCTION PANEL: CPT

## 2024-08-19 PROCEDURE — 2500000001 HC RX 250 WO HCPCS SELF ADMINISTERED DRUGS (ALT 637 FOR MEDICARE OP)

## 2024-08-19 PROCEDURE — 2500000004 HC RX 250 GENERAL PHARMACY W/ HCPCS (ALT 636 FOR OP/ED): Mod: JZ | Performed by: STUDENT IN AN ORGANIZED HEALTH CARE EDUCATION/TRAINING PROGRAM

## 2024-08-19 PROCEDURE — 97165 OT EVAL LOW COMPLEX 30 MIN: CPT | Mod: GO

## 2024-08-19 PROCEDURE — 97161 PT EVAL LOW COMPLEX 20 MIN: CPT | Mod: GP

## 2024-08-19 PROCEDURE — 96376 TX/PRO/DX INJ SAME DRUG ADON: CPT | Mod: 59

## 2024-08-19 PROCEDURE — 2500000004 HC RX 250 GENERAL PHARMACY W/ HCPCS (ALT 636 FOR OP/ED)

## 2024-08-19 PROCEDURE — RXMED WILLOW AMBULATORY MEDICATION CHARGE

## 2024-08-19 PROCEDURE — 96375 TX/PRO/DX INJ NEW DRUG ADDON: CPT | Mod: 59

## 2024-08-19 PROCEDURE — 2500000001 HC RX 250 WO HCPCS SELF ADMINISTERED DRUGS (ALT 637 FOR MEDICARE OP): Performed by: STUDENT IN AN ORGANIZED HEALTH CARE EDUCATION/TRAINING PROGRAM

## 2024-08-19 PROCEDURE — 83735 ASSAY OF MAGNESIUM: CPT

## 2024-08-19 PROCEDURE — G0378 HOSPITAL OBSERVATION PER HR: HCPCS

## 2024-08-19 PROCEDURE — 1200000002 HC GENERAL ROOM WITH TELEMETRY DAILY

## 2024-08-19 PROCEDURE — 93306 TTE W/DOPPLER COMPLETE: CPT | Performed by: INTERNAL MEDICINE

## 2024-08-19 RX ORDER — SODIUM CHLORIDE, SODIUM LACTATE, POTASSIUM CHLORIDE, CALCIUM CHLORIDE 600; 310; 30; 20 MG/100ML; MG/100ML; MG/100ML; MG/100ML
100 INJECTION, SOLUTION INTRAVENOUS CONTINUOUS
Status: ACTIVE | OUTPATIENT
Start: 2024-08-19 | End: 2024-08-20

## 2024-08-19 RX ORDER — FERROUS SULFATE, DRIED 160(50) MG
1 TABLET, EXTENDED RELEASE ORAL 2 TIMES DAILY
Status: DISCONTINUED | OUTPATIENT
Start: 2024-08-19 | End: 2024-08-20 | Stop reason: HOSPADM

## 2024-08-19 RX ORDER — NALOXONE HYDROCHLORIDE 0.4 MG/ML
0.2 INJECTION, SOLUTION INTRAMUSCULAR; INTRAVENOUS; SUBCUTANEOUS EVERY 5 MIN PRN
Status: DISCONTINUED | OUTPATIENT
Start: 2024-08-19 | End: 2024-08-20 | Stop reason: HOSPADM

## 2024-08-19 RX ORDER — DIPHENHYDRAMINE HCL 12.5MG/5ML
12.5 LIQUID (ML) ORAL EVERY 6 HOURS PRN
Status: DISCONTINUED | OUTPATIENT
Start: 2024-08-19 | End: 2024-08-20 | Stop reason: HOSPADM

## 2024-08-19 RX ORDER — OXYCODONE HYDROCHLORIDE 5 MG/1
5 TABLET ORAL EVERY 4 HOURS PRN
Status: DISCONTINUED | OUTPATIENT
Start: 2024-08-19 | End: 2024-08-20 | Stop reason: HOSPADM

## 2024-08-19 RX ORDER — METOCLOPRAMIDE 10 MG/1
10 TABLET ORAL EVERY 6 HOURS PRN
Status: DISCONTINUED | OUTPATIENT
Start: 2024-08-19 | End: 2024-08-20 | Stop reason: HOSPADM

## 2024-08-19 RX ORDER — DOCUSATE SODIUM 100 MG/1
100 CAPSULE, LIQUID FILLED ORAL 2 TIMES DAILY PRN
Qty: 30 CAPSULE | Refills: 0 | Status: SHIPPED | OUTPATIENT
Start: 2024-08-19 | End: 2024-09-04

## 2024-08-19 RX ORDER — FERROUS SULFATE, DRIED 160(50) MG
1 TABLET, EXTENDED RELEASE ORAL 2 TIMES DAILY
Qty: 180 TABLET | Refills: 0 | Status: SHIPPED | OUTPATIENT
Start: 2024-08-19 | End: 2024-11-17

## 2024-08-19 RX ORDER — OXYCODONE HYDROCHLORIDE 5 MG/1
10 TABLET ORAL EVERY 4 HOURS PRN
Status: DISCONTINUED | OUTPATIENT
Start: 2024-08-19 | End: 2024-08-20 | Stop reason: HOSPADM

## 2024-08-19 RX ORDER — ACETAMINOPHEN 325 MG/1
650 TABLET ORAL EVERY 6 HOURS SCHEDULED
Status: DISCONTINUED | OUTPATIENT
Start: 2024-08-19 | End: 2024-08-20 | Stop reason: HOSPADM

## 2024-08-19 RX ORDER — METOCLOPRAMIDE HYDROCHLORIDE 5 MG/ML
10 INJECTION INTRAMUSCULAR; INTRAVENOUS EVERY 6 HOURS PRN
Status: DISCONTINUED | OUTPATIENT
Start: 2024-08-19 | End: 2024-08-20 | Stop reason: HOSPADM

## 2024-08-19 RX ORDER — ONDANSETRON HYDROCHLORIDE 2 MG/ML
4 INJECTION, SOLUTION INTRAVENOUS EVERY 8 HOURS PRN
Status: DISCONTINUED | OUTPATIENT
Start: 2024-08-19 | End: 2024-08-20 | Stop reason: HOSPADM

## 2024-08-19 RX ORDER — ASPIRIN 81 MG/1
81 TABLET ORAL 2 TIMES DAILY
Qty: 84 TABLET | Refills: 0 | Status: SHIPPED | OUTPATIENT
Start: 2024-08-19 | End: 2024-09-30

## 2024-08-19 RX ORDER — BISACODYL 5 MG
10 TABLET, DELAYED RELEASE (ENTERIC COATED) ORAL DAILY PRN
Status: DISCONTINUED | OUTPATIENT
Start: 2024-08-19 | End: 2024-08-20 | Stop reason: HOSPADM

## 2024-08-19 RX ORDER — ERGOCALCIFEROL 1.25 MG/1
1250 CAPSULE ORAL DAILY
Status: COMPLETED | OUTPATIENT
Start: 2024-08-19 | End: 2024-08-20

## 2024-08-19 RX ORDER — HYDROMORPHONE HYDROCHLORIDE 1 MG/ML
0.4 INJECTION, SOLUTION INTRAMUSCULAR; INTRAVENOUS; SUBCUTANEOUS EVERY 4 HOURS PRN
Status: DISCONTINUED | OUTPATIENT
Start: 2024-08-19 | End: 2024-08-20 | Stop reason: HOSPADM

## 2024-08-19 RX ORDER — POLYETHYLENE GLYCOL 3350 17 G/17G
17 POWDER, FOR SOLUTION ORAL DAILY
Status: DISCONTINUED | OUTPATIENT
Start: 2024-08-19 | End: 2024-08-20 | Stop reason: HOSPADM

## 2024-08-19 RX ORDER — ACETAMINOPHEN 325 MG/1
650 TABLET ORAL EVERY 6 HOURS PRN
Qty: 120 TABLET | Refills: 0 | Status: SHIPPED | OUTPATIENT
Start: 2024-08-19 | End: 2024-09-04

## 2024-08-19 RX ORDER — ONDANSETRON 4 MG/1
4 TABLET, FILM COATED ORAL EVERY 8 HOURS PRN
Status: DISCONTINUED | OUTPATIENT
Start: 2024-08-19 | End: 2024-08-20 | Stop reason: HOSPADM

## 2024-08-19 RX ORDER — OXYCODONE HYDROCHLORIDE 5 MG/1
5 TABLET ORAL EVERY 4 HOURS PRN
Qty: 42 TABLET | Refills: 0 | Status: SHIPPED | OUTPATIENT
Start: 2024-08-19 | End: 2024-08-27

## 2024-08-19 RX ORDER — AMOXICILLIN 250 MG
2 CAPSULE ORAL 2 TIMES DAILY
Status: DISCONTINUED | OUTPATIENT
Start: 2024-08-19 | End: 2024-08-20 | Stop reason: HOSPADM

## 2024-08-19 RX ORDER — ASPIRIN 81 MG/1
81 TABLET ORAL 2 TIMES DAILY
Status: DISCONTINUED | OUTPATIENT
Start: 2024-08-19 | End: 2024-08-20 | Stop reason: HOSPADM

## 2024-08-19 RX ADMIN — PSYLLIUM HUSK 1 PACKET: 3.4 POWDER ORAL at 08:54

## 2024-08-19 RX ADMIN — ASPIRIN 81 MG: 81 TABLET, COATED ORAL at 08:54

## 2024-08-19 RX ADMIN — DOCUSATE SODIUM 100 MG: 100 CAPSULE, LIQUID FILLED ORAL at 08:54

## 2024-08-19 RX ADMIN — HYDROMORPHONE HYDROCHLORIDE 0.4 MG: 1 INJECTION, SOLUTION INTRAMUSCULAR; INTRAVENOUS; SUBCUTANEOUS at 11:23

## 2024-08-19 RX ADMIN — Medication 1 TABLET: at 20:55

## 2024-08-19 RX ADMIN — OXYCODONE HYDROCHLORIDE 10 MG: 5 TABLET ORAL at 18:31

## 2024-08-19 RX ADMIN — PERFLUTREN 1 ML OF DILUTION: 6.52 INJECTION, SUSPENSION INTRAVENOUS at 15:23

## 2024-08-19 RX ADMIN — OXYCODONE HYDROCHLORIDE 10 MG: 5 TABLET ORAL at 14:12

## 2024-08-19 RX ADMIN — OXYCODONE HYDROCHLORIDE 10 MG: 5 TABLET ORAL at 10:30

## 2024-08-19 RX ADMIN — ACETAMINOPHEN 650 MG: 325 TABLET ORAL at 18:31

## 2024-08-19 RX ADMIN — CEFAZOLIN SODIUM 2 G: 2 INJECTION, SOLUTION INTRAVENOUS at 06:05

## 2024-08-19 RX ADMIN — Medication 1 TABLET: at 08:54

## 2024-08-19 RX ADMIN — Medication 2 L/MIN: at 06:00

## 2024-08-19 RX ADMIN — ASPIRIN 81 MG: 81 TABLET, COATED ORAL at 20:55

## 2024-08-19 RX ADMIN — CETIRIZINE HYDROCHLORIDE 10 MG: 10 TABLET, FILM COATED ORAL at 08:54

## 2024-08-19 RX ADMIN — HYDROMORPHONE HYDROCHLORIDE 0.4 MG: 1 INJECTION, SOLUTION INTRAMUSCULAR; INTRAVENOUS; SUBCUTANEOUS at 20:55

## 2024-08-19 RX ADMIN — ERGOCALCIFEROL 1250 MCG: 1.25 CAPSULE ORAL at 08:54

## 2024-08-19 RX ADMIN — OXYCODONE HYDROCHLORIDE 10 MG: 5 TABLET ORAL at 06:00

## 2024-08-19 RX ADMIN — SENNOSIDES AND DOCUSATE SODIUM 2 TABLET: 50; 8.6 TABLET ORAL at 08:53

## 2024-08-19 RX ADMIN — POLYETHYLENE GLYCOL 3350 17 G: 17 POWDER, FOR SOLUTION ORAL at 08:54

## 2024-08-19 RX ADMIN — ACETAMINOPHEN 650 MG: 325 TABLET ORAL at 11:23

## 2024-08-19 RX ADMIN — ACETAMINOPHEN 650 MG: 325 TABLET ORAL at 05:13

## 2024-08-19 RX ADMIN — CYCLOBENZAPRINE 10 MG: 10 TABLET, FILM COATED ORAL at 05:14

## 2024-08-19 RX ADMIN — OXYCODONE HYDROCHLORIDE 10 MG: 5 TABLET ORAL at 01:56

## 2024-08-19 ASSESSMENT — PAIN DESCRIPTION - ORIENTATION
ORIENTATION: RIGHT

## 2024-08-19 ASSESSMENT — COGNITIVE AND FUNCTIONAL STATUS - GENERAL
DRESSING REGULAR LOWER BODY CLOTHING: A LITTLE
DRESSING REGULAR UPPER BODY CLOTHING: A LITTLE
TURNING FROM BACK TO SIDE WHILE IN FLAT BAD: A LITTLE
TOILETING: A LITTLE
HELP NEEDED FOR BATHING: A LITTLE
MOBILITY SCORE: 18
MOVING FROM LYING ON BACK TO SITTING ON SIDE OF FLAT BED WITH BEDRAILS: A LITTLE
DAILY ACTIVITIY SCORE: 20
WALKING IN HOSPITAL ROOM: A LITTLE
STANDING UP FROM CHAIR USING ARMS: A LITTLE
CLIMB 3 TO 5 STEPS WITH RAILING: A LITTLE
MOVING TO AND FROM BED TO CHAIR: A LITTLE

## 2024-08-19 ASSESSMENT — PAIN - FUNCTIONAL ASSESSMENT
PAIN_FUNCTIONAL_ASSESSMENT: 0-10
PAIN_FUNCTIONAL_ASSESSMENT: 0-10

## 2024-08-19 ASSESSMENT — PAIN SCALES - GENERAL
PAINLEVEL_OUTOF10: 8
PAINLEVEL_OUTOF10: 10 - WORST POSSIBLE PAIN
PAINLEVEL_OUTOF10: 8

## 2024-08-19 ASSESSMENT — PAIN DESCRIPTION - LOCATION
LOCATION: ANKLE
LOCATION: FOOT
LOCATION: ANKLE
LOCATION: FOOT
LOCATION: FOOT

## 2024-08-19 ASSESSMENT — ACTIVITIES OF DAILY LIVING (ADL)
BATHING_ASSISTANCE: MINIMAL
ADL_ASSISTANCE: INDEPENDENT
ADL_ASSISTANCE: INDEPENDENT
LACK_OF_TRANSPORTATION: NO

## 2024-08-19 ASSESSMENT — PAIN SCALES - WONG BAKER: WONGBAKER_NUMERICALRESPONSE: HURTS WORST

## 2024-08-19 NOTE — DISCHARGE INSTRUCTIONS
Orthopaedic Surgery Discharge Instructions:  Follow-Up Instructions  You will need to be seen in clinic by Dr. Blackburn in 2 weeks for a post-operative evaluation.    You will need to call and schedule an appointment, unless there is a previous appointment that appears on your discharge instructions.  The direct orthopaedic clinic appointment line phone number is [phone].  Please do not delay in calling to make this appointment.    You should also follow up with your primary care provider in 1-2 weeks.    Activity Restrictions  1) No driving until further instructed by your orthopaedic physician, which will be addressed at your outpatient appointments.    2) No driving or operating heavy machinery while taking narcotic pain medication.    3) Weight bearing status --> Non weight bearing, right lower extremity.     Discharge Medications  You have been sent home with the following home medications: Oxycodone, Colace, and Aspirin.  Please wean yourself off the oxycodone, as tolerated. A good time to take the medication is before physical therapy sessions and bedtime. Colace is a stool softener to reduce the narcotic pain medications cause. Take it twice a day while taking narcotic pain medication to ensure you maintain your regular bowel movement frequency. Baby aspirin is taken twice daily to help reduce your risk of blood clots.    You should also take tylenol 650mg every 6 hours as needed to reduce the amount of oxycodone you need for pain.    Splint/Cast care instructions:   1) Keep your splint on until your follow up visit with your surgeon.    2) Do not get your splint/cast wet for any reason. This includes protecting it from shower water, bath water, and the rain. If the cast/splint becomes wet for any reason, you need to be seen immediately, either in the emergency department or in the first available clinic appointment, in order to have the splint/cast changed. Allowing a wet splint/cast to sit on your skin  may cause skin breakdown and infection.    3) Do not stick any sharp objects (knives, forks, clothes hangers, etc) inside your splint/cast to itch. These objects scratch the skin, which may become infected. Alternatively, you may blow a hair dryer, on the cool air setting, in order to provide some relief.    4) You should keep your operative or injured extremity elevated at or above the level of your heart for the first 48-72 hours. This will help minimize the swelling in the immediate aftermath from surgery or from an acute fracture/injury.    5) You may ice your injured/operative extremity, which is especially useful to minimize swelling, in the first 48-72 hours. Make sure that the ice is not in direct contact with your skin, and that the ice does not leak out of it's bag. It will take ~30 minutes for the ice/cooling to move through your splint/cast material, but it will do so. Double-bagging ice is an effective technique.    6) If you begin to experience progressive and rapidly increasing pain that seems out of proportion to what you normally have been experiencing from your baseline pain after surgery/injury, or if your hand or foot become numb or turn blue and cold - you NEED TO CALL US IMMEDIATELY. Alternatively, you may come into the emergency department IMMEDIATELY for an emergent evaluation.

## 2024-08-19 NOTE — PROGRESS NOTES
Physical Therapy    Physical Therapy Evaluation    Patient Name: Rita Mckeon  MRN: [id number]  Today's Date: 8/19/2024   Time Calculation  Start Time: 0932  Stop Time: 0944  Time Calculation (min): 12 min    Assessment/Plan   PT Assessment  PT Assessment Results: Decreased strength, Decreased endurance, Impaired balance, Decreased mobility, Pain  Rehab Prognosis: Good  Evaluation/Treatment Tolerance: Patient tolerated treatment well  Medical Staff Made Aware: Yes  End of Session Communication: Bedside nurse  Assessment Comment: pt POD # 1 for R ankle ORIF. pt in high amount of pain during session but able to demonstrate mobility with SBA and use of FWW. pt benefits from continued PT to address deficits in strength, balance and mobility.  End of Session Patient Position: Bed, 3 rail up, Alarm off, not on at start of session  IP OR SWING BED PT PLAN  Inpatient or Swing Bed: Inpatient  PT Plan  Treatment/Interventions: Bed mobility, Transfer training, Gait training, Stair training, Balance training, Strengthening, Endurance training, Range of motion, Therapeutic exercise, Home exercise program, Therapeutic activity, Positioning  PT Plan: Ongoing PT  PT Frequency: 5 times per week  PT Discharge Recommendations: Low intensity level of continued care  Equipment Recommended upon Discharge: Wheeled walker  PT Recommended Transfer Status: Stand by assist, Assistive device  PT - OK to Discharge: Yes      Subjective   General Visit Information:  General  Reason for Referral: R trimal ankle fx dislocation now s/p R ankle ORIF on 8/18/2024  Past Medical History Relevant to Rehab: hypertension  Family/Caregiver Present: No  Co-Treatment: OT  Co-Treatment Reason: to maximize safe pt mobility and expedite DC planning  Prior to Session Communication: Bedside nurse  Patient Position Received: Bed, 3 rail up, Alarm off, not on at start of session  General Comment: pt supine alert and agreeable for PT  Home Living:  Home  Living  Type of Home: House  Lives With: Dependent children (8 month old, 7 yo and 15 yo. 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[employer])  Prior Function Comments: x1 fall in past 6 months; + drives  Precautions:  Precautions  LE Weight Bearing Status: Right Non-Weight Bearing  Medical Precautions: Fall precautions  Vital Signs:       Objective   Pain:  Pain Assessment  Pain Assessment: 0-10  0-10 (Numeric) Pain Score: 10 - Worst possible pain  Pain Type: Acute pain  Pain Location: Ankle  Pain Orientation: Right  Cognition:  Cognition  Overall Cognitive Status: Within Functional Limits    General Assessments:                  Activity Tolerance  Endurance: Endurance does not limit participation in activity    Sensation  Light Touch:  (N/T in R foot)            Perception  Inattention/Neglect: Appears intact      Coordination  Movements are Fluid and Coordinated: Yes    Postural Control  Postural Control: Within Functional Limits    Static Sitting Balance  Static Sitting-Balance Support: No upper extremity supported  Static Sitting-Level of Assistance: Close supervision  Dynamic Sitting Balance  Dynamic Sitting-Balance Support: No upper extremity supported  Dynamic Sitting-Level of Assistance: Close supervision    Static Standing Balance  Static Standing-Balance Support: Bilateral upper extremity supported (FWW)  Static Standing-Level of Assistance:  Close supervision  Dynamic Standing Balance  Dynamic Standing-Balance Support: Bilateral upper extremity supported (FWW)  Dynamic Standing-Level of Assistance: Close supervision  Functional Assessments:  Bed Mobility  Bed Mobility: Yes  Bed Mobility 1  Bed Mobility 1: Supine to sitting, Sitting to supine  Level of Assistance 1: Close supervision  Bed Mobility Comments 1: HOB elevated    Transfers  Transfer: Yes  Transfer 1  Transfer From 1: Bed to  Transfer to 1: Stand, Sit  Technique 1: Sit to stand, Stand to sit  Transfer Device 1: Walker  Transfer Level of Assistance 1:  (SBA, min VC for hand placement and to maintain NWB to RLE)  Trials/Comments 1: x1    Ambulation/Gait Training  Ambulation/Gait Training Performed: Yes  Ambulation/Gait Training 1  Surface 1: Level tile  Device 1: Rolling walker  Assistance 1:  (SBA, min cues)  Quality of Gait 1: Narrow base of support  Comments/Distance (ft) 1: ~4ft; hopping steps on LLE, BUE support on FWW    Stairs  Stairs: No  Extremity/Trunk Assessments:  RLE   RLE : Exceptions to WFL  AROM RLE (degrees)  RLE AROM Comment:  (R ankle limited 2/2 in SLS; WFL at hip and knee)  Strength RLE  RLE Overall Strength: Greater than or equal to 3/5 as evidenced by functional mobility  LLE   LLE : Within Functional Limits  Outcome Measures:  [facility] Basic Mobility  Turning from your back to your side while in a flat bed without using bedrails: A little  Moving from lying on your back to sitting on the side of a flat bed without using bedrails: A little  Moving to and from bed to chair (including a wheelchair): A little  Standing up from a chair using your arms (e.g. wheelchair or bedside chair): A little  To walk in hospital room: A little  Climbing 3-5 steps with railing: A little  Basic Mobility - Total Score: 18    Encounter Problems       Encounter Problems (Active)       Mobility       STG - Patient will ambulate >/= 200ft with modified independence and LRAD (Progressing)        Start:  08/19/24    Expected End:  09/02/24            STG - Patient will ascend and descend four to six stairs SBA (Progressing)       Start:  08/19/24    Expected End:  09/02/24               PT Transfers       STG - Transfer from bed to chair modified independence and LRAD (Progressing)       Start:  08/19/24    Expected End:  09/02/24            STG - Patient will perform bed mobility modified independence  (Progressing)       Start:  08/19/24    Expected End:  09/02/24            STG - Patient will transfer sit to and from stand modified independence and LRAD (Progressing)       Start:  08/19/24    Expected End:  09/02/24               Pain - Adult              Education Documentation  Precautions, taught by Catie Perry PT at 8/19/2024 10:28 AM.  Learner: Patient  Readiness: Acceptance  Method: Explanation  Response: Verbalizes Understanding    Body Mechanics, taught by Catie Perry PT at 8/19/2024 10:28 AM.  Learner: Patient  Readiness: Acceptance  Method: Explanation  Response: Verbalizes Understanding    Mobility Training, taught by Catie Perry PT at 8/19/2024 10:28 AM.  Learner: Patient  Readiness: Acceptance  Method: Explanation  Response: Verbalizes Understanding    Education Comments  No comments found.        08/19/24 at 10:29 AM - Catie Perry PT
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
## 2024-08-19 NOTE — PROGRESS NOTES
Occupational Therapy    Evaluation    Patient Name: Rita Mckeon  MRN: [id number]  Today's Date: 8/19/2024  Time Calculation  Start Time: 0933  Stop Time: 0944  Time Calculation (min): 11 min  OT Student under direct supervision of OTR/L  Assessment  IP OT Assessment  OT Assessment: decreased ADL participation and functional mobility  Prognosis: Good  Barriers to Discharge: None  Evaluation/Treatment Tolerance: Patient limited by pain  Medical Staff Made Aware: Yes  End of Session Communication: Bedside nurse  End of Session Patient Position: Bed, 3 rail up, Alarm off, not on at start of session  Plan:  Treatment Interventions: ADL retraining, Endurance training, Patient/family training, Equipment evaluation/education, Neuromuscular reeducation, Compensatory technique education  OT Frequency: 2 times per week  OT Discharge Recommendations: Low intensity level of continued care  Equipment Recommended upon Discharge:  (TBD)  OT Recommended Transfer Status: Stand by assist  OT - OK to Discharge: Yes    Subjective   General:  General  Reason for Referral: R trimal ankle fx dislocation now s/p R ankle ORIF on 8/18/2024  Past Medical History Relevant to Rehab: hypertension  Family/Caregiver Present: No  Co-Treatment: PT  Co-Treatment Reason: to maximize safe pt mobility and expedite DC planning  Prior to Session Communication: Bedside nurse  Patient Position Received: Bed, 3 rail up, Alarm off, not on at start of session  General Comment: Patient agreeable to OT eval, upon arrival patient on tele and 2L O2  Precautions:  Hearing/Visual Limitations: WFL  LE Weight Bearing Status: Right Non-Weight Bearing  Medical Precautions: Fall precautions, Oxygen therapy device and L/min  Pain:  Pain Assessment  Pain Assessment: 0-10  0-10 (Numeric) Pain Score: 10 - Worst possible pain  Pain Type: Acute pain  Pain Location: Ankle  Pain Orientation: Right    Objective   Cognition:  Overall Cognitive Status: Within Functional  Limits  Arousal/Alertness: Appropriate responses to stimuli  Orientation Level: Oriented X4  Following Commands: Follows one step commands consistently        [place] Agitation Sedation Scale  [place] Agitation Sedation Scale (RASS): Alert and calm  Home Living:  Type of Home: House  Lives With: Dependent children  Home Adaptive Equipment:  (3 children ages 14, 8, and 8 months old)  Home Layout: One level  Home Access: Stairs to enter with rails  Entrance Stairs-Rails: Both  Entrance Stairs-Number of Steps: 5  Bathroom Shower/Tub: Tub/shower unit  Bathroom Toilet: Standard  Bathroom Equipment: None   Prior Function:  Level of [place]: Independent with ADLs and functional transfers, Independent with homemaking with ambulation  Receives Help From:  (Cousin and mother are local and able to help)  ADL Assistance: Independent  Homemaking Assistance: Independent  Ambulatory Assistance: Independent  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)  Hand Dominance: Right  Prior Function Comments: x1 fall in past 6 months; + drives  ADL:  Eating Assistance: Independent  Eating Deficit: Setup  Grooming Assistance: Independent  Grooming Deficit:  (anticipated)  Bathing Assistance: Minimal  Bathing Deficit:  (anticipated)  UE Dressing Assistance: Stand by  UE Dressing Deficit:  (anticipated)  LE Dressing Assistance: Minimal  LE Dressing Deficit: Don/doff L sock  Toileting Assistance with Device: Minimal  Toileting Deficit:  (anticipated)  Activity Tolerance:  Early Mobility/Exercise Safety Screen: Proceed with mobilization - No exclusion criteria met  Bed Mobility/Transfers: Bed Mobility  Bed Mobility: Yes  Bed Mobility 1  Bed Mobility 1: Supine to sitting, Sitting to supine  Level of Assistance 1: Close supervision, Moderate verbal cues  Bed Mobility Comments 1: HOB elevated, VCs for sequencing    Transfers  Transfer: Yes  Transfer 1  Transfer From 1: Sit to, Stand to  Transfer to 1: Stand, Sit  Technique 1:  Sit to stand, Stand to sit  Transfer Device 1: Walker  Transfer Level of Assistance 1: Close supervision, Minimal verbal cues  Trials/Comments 1: VCs for hand placement and to maintain precautions  Sitting Balance:  Static Sitting Balance  Static Sitting-Level of Assistance: Independent  Dynamic Sitting Balance  Dynamic Sitting-Level of Assistance: Independent  Standing Balance:  Static Standing Balance  Static Standing-Level of Assistance: Close supervision  Dynamic Standing Balance  Dynamic Standing-Level of Assistance: Close supervision  Vision: Vision - Basic Assessment  Current Vision: Wears glasses all the time  Sensation:  Sensation Comment: Numbness and tingling in R foot  Strength:  Strength Comments: (B)  4/5, (B) elbows >=3+/5, (B) shoulders >=3+/5  Hand Function:  Hand Function  Gross Grasp: Functional  Coordination: Functional  Extremities: RUE   RUE : Within Functional Limits and LUE   LUE: Within Functional Limits  Outcome Measures: [facility] Daily Activity  Putting on and taking off regular lower body clothing: A little  Bathing (including washing, rinsing, drying): A little  Putting on and taking off regular upper body clothing: A little  Toileting, which includes using toilet, bedpan or urinal: A little  Taking care of personal grooming such as brushing teeth: None  Eating Meals: None  Daily Activity - Total Score: 20  , Early Mobility/Exercise Safety Screen: Proceed with mobilization - No exclusion criteria met   , E = Exercise and Early Mobility  Early Mobility/Exercise Safety Screen: Proceed with mobilization - No exclusion criteria met, and OT Adult Other Outcome Measures  4AT: Negative  Education Documentation  Body Mechanics, taught by GILBERTO Frank at 8/19/2024 12:00 PM.  Learner: Patient  Readiness: Acceptance  Method: Explanation  Response: Verbalizes Understanding    Precautions, taught by GILBERTO Frank at 8/19/2024 12:00 PM.  Learner: Patient  Readiness:  Acceptance  Method: Explanation  Response: Verbalizes Understanding    ADL Training, taught by GILBERTO Frank at 8/19/2024 12:00 PM.  Learner: Patient  Readiness: Acceptance  Method: Explanation  Response: Verbalizes Understanding    Education Comments  No comments found.      Goals:   Encounter Problems       Encounter Problems (Active)       ADLs       Patient with complete upper body dressing with independent level of assistance donning and doffing all UE clothes  (Progressing)       Start:  08/19/24    Expected End:  09/02/24            Patient with complete lower body dressing with modified independent level of assistance donning and doffing all LE clothes  with PRN adaptive equipment  (Progressing)       Start:  08/19/24    Expected End:  09/02/24            Patient will complete toileting including hygiene clothing management/hygiene with independent level of assistance. (Progressing)       Start:  08/19/24    Expected End:  09/02/24            Patient will complete basic IADL/simulated IADL tasks with independence utilizing least restrictive device (Progressing)       Start:  08/19/24    Expected End:  09/02/24               BALANCE       Pt will maintain dynamic standing balance during ADL task with independent level of assistance in order to demonstrate decreased risk of falling and improved postural control. (Progressing)       Start:  08/19/24    Expected End:  09/02/24               COGNITION/SAFETY       Patient will recall and adhere to weight bearing restrictions with all ADL and functional mobility in order to promote healing and safety with functional tasks (Progressing)       Start:  08/19/24    Expected End:  09/02/24               EXERCISE/STRENGTHENING       Patient will tolerate >10 minutes of activity with no more than 2 rest breaks utilizing EC/WS strategies as needed   (Progressing)       Start:  08/19/24    Expected End:  09/02/24               MOBILITY       Patient will  perform Functional mobility Household distances/Community Distances with independent level of assistance and least restrictive device in order to improve safety and functional mobility. (Progressing)       Start:  08/19/24    Expected End:  09/02/24               TRANSFERS       Patient will perform bed mobility independent level of assistance in order to improve safety and independence with mobility (Progressing)       Start:  08/19/24    Expected End:  09/02/24            Patient will complete functional transfers with least restrictive device with independent level of assistance. (Progressing)       Start:  08/19/24    Expected End:  09/02/24               Kesha Argueta S/OT

## 2024-08-19 NOTE — CARE PLAN
The patient's goals for the shift include      The clinical goals for the shift include patients pain will be controlled during shift      Problem: Pain - Adult  Goal: Verbalizes/displays adequate comfort level or baseline comfort level  Outcome: Progressing     Problem: Safety - Adult  Goal: Free from fall injury  Outcome: Progressing     Problem: Discharge Planning  Goal: Discharge to home or other facility with appropriate resources  Outcome: Progressing     Problem: Chronic Conditions and Co-morbidities  Goal: Patient's chronic conditions and co-morbidity symptoms are monitored and maintained or improved  Outcome: Progressing

## 2024-08-19 NOTE — CONSULTS
Reason For Consult  Post-op afib    History Of Present Illness  Rita Mckeon is a 33 y.o. female  with PMHx of HTN presenting with ankle fracture. Underwent ORIF R ankle with orthopedic surgery on 8/18. Medicine is being consulted for intra-op afib.   Per op note, pt briefly went into atrial fibrillation intraoperatively per anesthesia. She was given 5mg IV metop and 10mg IV labetalol during the procedure. Unclear what her HRs were. EKG post op showing NSR.   Patient denies hx of afib. Denies palpitations, chest pain, sob, orthopnea, LE edema. 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 She denies any cardiac hx and denies ever having cardiac testing performed.     Today, patient was in NSR and stable on exam. She states that her parents have had A-Fib before but she has never experienced it or felt any symptoms.      Past Medical History  HTN    Surgical History  ORIF R ankle with orthopedic surgery on 8/18.     Social History  She reports that she has been smoking cigars. She has never used smokeless tobacco. She reports current alcohol use of about 14.0 standard drinks of alcohol per week. She reports current drug use. Drug: Marijuana.    Family History  Family History   Problem Relation Name Age of Onset    Diabetes Father's Sister Noy gupta     Diabetes Maternal Grandmother Karina mckeon     Cancer Other Grandmother (unknown)     Cancer Other Uncle      Current Outpatient Medications   Medication Instructions    acetaminophen (TYLENOL) 650 mg, oral, Every 6 hours PRN    aspirin 81 mg, oral, 2 times daily    calcium carbonate-vitamin D3 500 mg-5 mcg (200 unit) tablet 1 tablet, oral, 2 times daily    docusate sodium (COLACE) 100 mg, oral, 2 times daily PRN    loratadine (Claritin) 10 mg tablet TAKE 1 TABLET BY MOUTH EVERYDAY AT BEDTIME    oxyCODONE (ROXICODONE) 5 mg, oral, Every 4 hours PRN    prenatal vitamin, iron-folic, (Prenatal Vitamin) 27 mg iron-800 mcg folic acid tablet 1  tablet, oral, Daily      Allergies  Patient has no known allergies.       Physical Exam  General: NAD, appears comfortable  HEENT: NCAT, no scleral icterus, EOMI  Heart: RRR, no m/r/g  Lungs: CTAB, no rales/ronchi/wheezing  Ext: trace LLE edema, RLE in cast  Neuro: Aox4, NFND  Psych: mood and affect appropriate      Last Recorded Vitals      8/18/2024    11:00 AM 8/18/2024    11:15 AM 8/18/2024    12:11 PM 8/18/2024     9:00 PM 8/18/2024    11:46 PM 8/19/2024     4:46 AM 8/19/2024     8:36 AM   Vitals   Systolic 175 171 165 165 166 157 155   Diastolic 99 99 104 99 100 94 79   Heart Rate 74 70 63 68 65 70 69   Temp  36.5 °C (97.7 °F) 35.7 °C (96.3 °F) 36.4 °C (97.5 °F) 36.1 °C (97 °F) 36.2 °C (97.1 °F) 35.9 °C (96.6 °F)   Resp 17 15 18 18 18 17 16         Assessment/Plan   Rita Mckeon is a 33 y.o. female  with PMHx of HTN presenting with ankle fracture. Underwent  ORIF R ankle with orthopedic surgery on 8/18. Medicine is being consulted for intra-op afib. Patient was stable today, and had no further bouts of a-fib.     #Intra-op afib  ::[id number] 1  ::No known hx of afib, currently in NSR  ::TSH -> 1.25  -Monitor on tele  - Recommend TTE to rule out potential structural causes  - recommend Zio Patch for cardiac monitoring  - recommend checking RFP with Magnesium   - Recommend follow up outpatient with PCP    Patient was seen with Dr. Jurado.     Matt Ramos DO

## 2024-08-19 NOTE — PROGRESS NOTES
"Orthopaedic Surgery Progress Note    S:  NAEO. Doing well, pain well controlled in context of recent surgery. Denies chest pain, dyspnea.     O:  BP (!) 166/100 (BP Location: Left arm, Patient Position: Sitting)   Pulse 65   Temp 36.1 °C (97 °F) (Temporal)   Resp 18   Ht 1.676 m (5' 6\")   Wt 111 kg (245 lb)   LMP 08/04/2024 (Approximate)   SpO2 100%   BMI 39.54 kg/m²     Gen: arousable, NAD, appropriately conversational  Cardiac: RRR to peripheral palpation  Resp: nonlabored on RA  GI: soft, nondistended    MSK:  Right Lower Extremity:   -Dressing cdi  - In SLS  -Fires EHL/FHL  -SILT in saph/sural/SPN/DPN distributions  -Foot warm, well perfused  -Palpable DP pulse, brisk cap refill  -Compartments soft and compressible    A/P: 33 y.o. female  presents w/ R trimal ankle fx dislocation now s/p R ankle ORIF on 8/18/2024 with Dr. Blackburn.     Plan:  - tele postop for cardiac monitoring as patient went into afib briefly  - medicine consult for intra-op afib  - Weight bearing: NWB RLE in SLS  - DVT ppx: SCDs, ASA 81 BID  - Diet: Regular  - Pain: Tylenol, oxycodone 5/10  - Antibiotics: perioperative ancef 2g q8hr x3 doses  - FEN: HLIV with good PO intake  - Bowel Regimen: Colace, senna, dulcolax  - PT/OT  - Pulm: Encourage IS  - Continue home medications  - No vazquez    Dispo: pending PT    This patient will be followed by Ortho Trauma team (All chat preferred):     1st call: Jason Torres, PGY-1  1st call: Gold Katz, PGY-1  2nd call: Barrington Degroot, PGY-2  3rd call: Rober Lopez, PGY-3      "

## 2024-08-19 NOTE — CONSULTS
Reason For Consult  Post-op afib    History Of Present Illness  Rita Mckeon is a 33 y.o. female  with PMHx of HTN presenting with ankle fracture. Underwent ORIF R ankle with orthopedic surgery on 8/18. Medicine is being consulted for intra-op afib.   Per op note, pt briefly went into atrial fibrillation intraoperatively per anesthesia. She was given 5mg IV metop and 10mg IV labetalol during the procedure. Unclear what her HRs were. EKG post op showing NSR.   Patient denies hx of afib. Denies palpitations, chest pain, sob, orthopnea, LE edema. 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 She denies any cardiac hx and denies ever having cardiac testing performed.      Past Medical History  HTN    Surgical History  ORIF R ankle with orthopedic surgery on 8/18.     Social History  She reports that she has been smoking cigars. She has never used smokeless tobacco. She reports current alcohol use of about 14.0 standard drinks of alcohol per week. She reports current drug use. Drug: Marijuana.    Family History  Family History   Problem Relation Name Age of Onset    Diabetes Father's Sister Noy gupta     Diabetes Maternal Grandmother Karina mckeon     Cancer Other Grandmother (unknown)     Cancer Other Uncle      Current Outpatient Medications   Medication Instructions    loratadine (Claritin) 10 mg tablet TAKE 1 TABLET BY MOUTH EVERYDAY AT BEDTIME    prenatal vitamin, iron-folic, (Prenatal Vitamin) 27 mg iron-800 mcg folic acid tablet 1 tablet, oral, Daily      Allergies  Patient has no known allergies.       Physical Exam  General: NAD, appears comfortable  HEENT: NCAT, no scleral icterus, EOMI  Heart: RRR, no m/r/g  Lungs: CTAB, no rales/ronchi/wheezing  Ext: trace LLE edema, RLE in cast  Neuro: Aox4, NFND  Psych: mood and affect appropriate      Last Recorded Vitals      8/18/2024    10:00 AM 8/18/2024    10:15 AM 8/18/2024    10:30 AM 8/18/2024    10:45 AM 8/18/2024    11:00 AM  8/18/2024    11:15 AM 8/18/2024    12:11 PM   Vitals   Systolic 171 179 174 175 175 171 165   Diastolic 100 103 101 100 99 99 104   Heart Rate 70 64 69 60 74 70 63   Temp      36.5 °C (97.7 °F) 35.7 °C (96.3 °F)   Resp 11 14 16 14 17 15 18         Assessment/Plan   Rita Mckeon is a 33 y.o. female  with PMHx of HTN presenting with ankle fracture. Underwent  ORIF R ankle with orthopedic surgery on 8/18. Medicine is being consulted for intra-op afib.     #Intra-op afib  ::[id number] 1  ::No known hx of afib, currently in NSR  -Monitor on tele  -Obtain TSH      Patient to be seen and staffed with attending physician in the AM.     Lilia Liao MD  Internal Medicine, PGY3

## 2024-08-20 ENCOUNTER — PHARMACY VISIT (OUTPATIENT)
Dept: PHARMACY | Facility: CLINIC | Age: 34
End: 2024-08-20
Payer: MEDICAID

## 2024-08-20 ENCOUNTER — DOCUMENTATION (OUTPATIENT)
Dept: HOME HEALTH SERVICES | Facility: HOME HEALTH | Age: 34
End: 2024-08-20
Payer: COMMERCIAL

## 2024-08-20 ENCOUNTER — APPOINTMENT (OUTPATIENT)
Dept: CARDIOLOGY | Facility: HOSPITAL | Age: 34
End: 2024-08-20
Payer: COMMERCIAL

## 2024-08-20 VITALS
OXYGEN SATURATION: 98 % | WEIGHT: 245 LBS | TEMPERATURE: 97.2 F | DIASTOLIC BLOOD PRESSURE: 130 MMHG | BODY MASS INDEX: 39.37 KG/M2 | SYSTOLIC BLOOD PRESSURE: 182 MMHG | HEIGHT: 66 IN | HEART RATE: 79 BPM | RESPIRATION RATE: 17 BRPM

## 2024-08-20 LAB
ANION GAP SERPL CALC-SCNC: 15 MMOL/L (ref 10–20)
BODY SURFACE AREA: 2.27 M2
BUN SERPL-MCNC: 11 MG/DL (ref 6–23)
CALCIUM SERPL-MCNC: 9.5 MG/DL (ref 8.6–10.6)
CHLORIDE SERPL-SCNC: 105 MMOL/L (ref 98–107)
CO2 SERPL-SCNC: 21 MMOL/L (ref 21–32)
CREAT SERPL-MCNC: 0.77 MG/DL (ref 0.5–1.05)
EGFRCR SERPLBLD CKD-EPI 2021: >90 ML/MIN/1.73M*2
ERYTHROCYTE [DISTWIDTH] IN BLOOD BY AUTOMATED COUNT: 14.6 % (ref 11.5–14.5)
GLUCOSE SERPL-MCNC: 103 MG/DL (ref 74–99)
HCT VFR BLD AUTO: 38.7 % (ref 36–46)
HGB BLD-MCNC: 12.5 G/DL (ref 12–16)
MCH RBC QN AUTO: 25.5 PG (ref 26–34)
MCHC RBC AUTO-ENTMCNC: 32.3 G/DL (ref 32–36)
MCV RBC AUTO: 79 FL (ref 80–100)
NRBC BLD-RTO: 0 /100 WBCS (ref 0–0)
PLATELET # BLD AUTO: 203 X10*3/UL (ref 150–450)
POTASSIUM SERPL-SCNC: 3.7 MMOL/L (ref 3.5–5.3)
RBC # BLD AUTO: 4.9 X10*6/UL (ref 4–5.2)
SODIUM SERPL-SCNC: 137 MMOL/L (ref 136–145)
WBC # BLD AUTO: 14.5 X10*3/UL (ref 4.4–11.3)

## 2024-08-20 PROCEDURE — 2500000004 HC RX 250 GENERAL PHARMACY W/ HCPCS (ALT 636 FOR OP/ED): Performed by: STUDENT IN AN ORGANIZED HEALTH CARE EDUCATION/TRAINING PROGRAM

## 2024-08-20 PROCEDURE — 99232 SBSQ HOSP IP/OBS MODERATE 35: CPT

## 2024-08-20 PROCEDURE — 2500000001 HC RX 250 WO HCPCS SELF ADMINISTERED DRUGS (ALT 637 FOR MEDICARE OP)

## 2024-08-20 PROCEDURE — 93246 EXT ECG>7D<15D RECORDING: CPT

## 2024-08-20 PROCEDURE — RXMED WILLOW AMBULATORY MEDICATION CHARGE

## 2024-08-20 PROCEDURE — 85027 COMPLETE CBC AUTOMATED: CPT | Performed by: STUDENT IN AN ORGANIZED HEALTH CARE EDUCATION/TRAINING PROGRAM

## 2024-08-20 PROCEDURE — 36415 COLL VENOUS BLD VENIPUNCTURE: CPT | Performed by: STUDENT IN AN ORGANIZED HEALTH CARE EDUCATION/TRAINING PROGRAM

## 2024-08-20 PROCEDURE — 80048 BASIC METABOLIC PNL TOTAL CA: CPT | Performed by: STUDENT IN AN ORGANIZED HEALTH CARE EDUCATION/TRAINING PROGRAM

## 2024-08-20 PROCEDURE — 2500000002 HC RX 250 W HCPCS SELF ADMINISTERED DRUGS (ALT 637 FOR MEDICARE OP, ALT 636 FOR OP/ED)

## 2024-08-20 PROCEDURE — G0378 HOSPITAL OBSERVATION PER HR: HCPCS

## 2024-08-20 PROCEDURE — 2500000001 HC RX 250 WO HCPCS SELF ADMINISTERED DRUGS (ALT 637 FOR MEDICARE OP): Performed by: STUDENT IN AN ORGANIZED HEALTH CARE EDUCATION/TRAINING PROGRAM

## 2024-08-20 RX ORDER — HYDRALAZINE HYDROCHLORIDE 10 MG/1
10 TABLET, FILM COATED ORAL 3 TIMES DAILY
Status: DISCONTINUED | OUTPATIENT
Start: 2024-08-20 | End: 2024-08-20 | Stop reason: HOSPADM

## 2024-08-20 RX ORDER — CARVEDILOL 6.25 MG/1
6.25 TABLET ORAL 2 TIMES DAILY
Qty: 60 TABLET | Refills: 2 | Status: SHIPPED | OUTPATIENT
Start: 2024-08-20

## 2024-08-20 RX ORDER — POTASSIUM CHLORIDE 20 MEQ/1
20 TABLET, EXTENDED RELEASE ORAL ONCE
Status: COMPLETED | OUTPATIENT
Start: 2024-08-20 | End: 2024-08-20

## 2024-08-20 RX ADMIN — ACETAMINOPHEN 650 MG: 325 TABLET ORAL at 06:17

## 2024-08-20 RX ADMIN — CYCLOBENZAPRINE 10 MG: 10 TABLET, FILM COATED ORAL at 15:29

## 2024-08-20 RX ADMIN — ASPIRIN 81 MG: 81 TABLET, COATED ORAL at 09:31

## 2024-08-20 RX ADMIN — BISACODYL 10 MG: 5 TABLET, COATED ORAL at 06:15

## 2024-08-20 RX ADMIN — ACETAMINOPHEN 650 MG: 325 TABLET ORAL at 05:37

## 2024-08-20 RX ADMIN — CETIRIZINE HYDROCHLORIDE 10 MG: 10 TABLET, FILM COATED ORAL at 09:31

## 2024-08-20 RX ADMIN — OXYCODONE HYDROCHLORIDE 10 MG: 5 TABLET ORAL at 05:36

## 2024-08-20 RX ADMIN — SENNOSIDES AND DOCUSATE SODIUM 2 TABLET: 50; 8.6 TABLET ORAL at 09:31

## 2024-08-20 RX ADMIN — DOCUSATE SODIUM 100 MG: 100 CAPSULE, LIQUID FILLED ORAL at 09:32

## 2024-08-20 RX ADMIN — OXYCODONE HYDROCHLORIDE 10 MG: 5 TABLET ORAL at 00:02

## 2024-08-20 RX ADMIN — OXYCODONE HYDROCHLORIDE 10 MG: 5 TABLET ORAL at 10:15

## 2024-08-20 RX ADMIN — HYDRALAZINE HYDROCHLORIDE 10 MG: 10 TABLET ORAL at 05:37

## 2024-08-20 RX ADMIN — HYDRALAZINE HYDROCHLORIDE 10 MG: 10 TABLET ORAL at 12:56

## 2024-08-20 RX ADMIN — OXYCODONE HYDROCHLORIDE 5 MG: 5 TABLET ORAL at 14:24

## 2024-08-20 RX ADMIN — POLYETHYLENE GLYCOL 3350 17 G: 17 POWDER, FOR SOLUTION ORAL at 09:31

## 2024-08-20 RX ADMIN — ACETAMINOPHEN 650 MG: 325 TABLET ORAL at 00:02

## 2024-08-20 RX ADMIN — ERGOCALCIFEROL 1250 MCG: 1.25 CAPSULE ORAL at 09:31

## 2024-08-20 RX ADMIN — Medication 1 TABLET: at 09:31

## 2024-08-20 RX ADMIN — POTASSIUM CHLORIDE 20 MEQ: 1500 TABLET, EXTENDED RELEASE ORAL at 06:16

## 2024-08-20 RX ADMIN — PSYLLIUM HUSK 1 PACKET: 3.4 POWDER ORAL at 09:00

## 2024-08-20 RX ADMIN — ACETAMINOPHEN 650 MG: 325 TABLET ORAL at 10:11

## 2024-08-20 SDOH — ECONOMIC STABILITY: FOOD INSECURITY: WITHIN THE PAST 12 MONTHS, THE FOOD YOU BOUGHT JUST DIDN'T LAST AND YOU DIDN'T HAVE MONEY TO GET MORE.: SOMETIMES TRUE

## 2024-08-20 SDOH — ECONOMIC STABILITY: TRANSPORTATION INSECURITY
IN THE PAST 12 MONTHS, HAS THE LACK OF TRANSPORTATION KEPT YOU FROM MEDICAL APPOINTMENTS OR FROM GETTING MEDICATIONS?: NO

## 2024-08-20 SDOH — ECONOMIC STABILITY: FOOD INSECURITY

## 2024-08-20 SDOH — ECONOMIC STABILITY: TRANSPORTATION INSECURITY
IN THE PAST 12 MONTHS, HAS LACK OF TRANSPORTATION KEPT YOU FROM MEETINGS, WORK, OR FROM GETTING THINGS NEEDED FOR DAILY LIVING?: NO

## 2024-08-20 SDOH — ECONOMIC STABILITY: FOOD INSECURITY: WITHIN THE PAST 12 MONTHS, YOU WORRIED THAT YOUR FOOD WOULD RUN OUT BEFORE YOU GOT MONEY TO BUY MORE.: NEVER TRUE

## 2024-08-20 SDOH — ECONOMIC STABILITY: TRANSPORTATION INSECURITY

## 2024-08-20 SDOH — ECONOMIC STABILITY: TRANSPORTATION INSECURITY: IN THE PAST 12 MONTHS, HAS LACK OF TRANSPORTATION KEPT YOU FROM MEDICAL APPOINTMENTS OR FROM GETTING MEDICATIONS?: NO

## 2024-08-20 SDOH — ECONOMIC STABILITY: FOOD INSECURITY: WITHIN THE PAST 12 MONTHS, YOU WORRIED THAT YOUR FOOD WOULD RUN OUT BEFORE YOU GOT THE MONEY TO BUY MORE.: NEVER TRUE

## 2024-08-20 ASSESSMENT — PAIN - FUNCTIONAL ASSESSMENT
PAIN_FUNCTIONAL_ASSESSMENT: 0-10

## 2024-08-20 ASSESSMENT — PAIN DESCRIPTION - ORIENTATION
ORIENTATION: RIGHT
ORIENTATION: RIGHT

## 2024-08-20 ASSESSMENT — COGNITIVE AND FUNCTIONAL STATUS - GENERAL
TOILETING: A LITTLE
TURNING FROM BACK TO SIDE WHILE IN FLAT BAD: A LITTLE
CLIMB 3 TO 5 STEPS WITH RAILING: A LITTLE
WALKING IN HOSPITAL ROOM: A LITTLE
MOVING TO AND FROM BED TO CHAIR: A LITTLE
MOBILITY SCORE: 18
MOVING FROM LYING ON BACK TO SITTING ON SIDE OF FLAT BED WITH BEDRAILS: A LITTLE
DAILY ACTIVITIY SCORE: 20
HELP NEEDED FOR BATHING: A LITTLE
DRESSING REGULAR LOWER BODY CLOTHING: A LITTLE
DRESSING REGULAR UPPER BODY CLOTHING: A LITTLE
STANDING UP FROM CHAIR USING ARMS: A LITTLE

## 2024-08-20 ASSESSMENT — PAIN SCALES - GENERAL
PAINLEVEL_OUTOF10: 8
PAINLEVEL_OUTOF10: 9
PAINLEVEL_OUTOF10: 1
PAINLEVEL_OUTOF10: 2
PAINLEVEL_OUTOF10: 7
PAINLEVEL_OUTOF10: 7

## 2024-08-20 ASSESSMENT — PAIN DESCRIPTION - LOCATION
LOCATION: ANKLE
LOCATION: ANKLE

## 2024-08-20 ASSESSMENT — PAIN SCALES - WONG BAKER: WONGBAKER_NUMERICALRESPONSE: NO HURT

## 2024-08-20 ASSESSMENT — ACTIVITIES OF DAILY LIVING (ADL): LACK_OF_TRANSPORTATION: NO

## 2024-08-20 NOTE — HH CARE COORDINATION
Home Care received a Referral for Physical Therapy. We have processed the referral for a Start of Care on 8/21-8/22.     If you have any questions or concerns, please feel free to contact us at [phone]. Follow the prompts, enter your five digit zip code, and you will be directed to your care team on [facility].

## 2024-08-20 NOTE — PROGRESS NOTES
Transitional Care Coordinator Note: Patient discussed with medical team (Ortho) per medical team patient is not medically ready, pending updates from medicine team for possible Holter monitor upon discharge. Discharge dispo: Plan for patient to discharge home with , [facility] following for discharge, pending SOC. ADOD 8/20. FWW approved to be dispensed from [facility] onsite closet. Bedside nurse updated.     Martin PIERSONN   Transitional Care Coordinator     UPDATE 8/20/2024 15:18   [facility] updated, patient accepted, insurance verified and SOC date 24-48hrs. Patient updated. FWW provided to patient.       Martin PIERSONN   Transitional Care Coordinator

## 2024-08-20 NOTE — CARE PLAN
The patient's goals for the shift include      The clinical goals for the shift include Pt pain will have adequate pain controll throughout the shift

## 2024-08-20 NOTE — PROGRESS NOTES
Laila Aguero is a 33 y.o. female on day 2 of admission presenting with Trimalleolar fracture of ankle, closed, right, initial encounter.      Subjective   Today, patient is stable and wishes to go home. She understands that she needs to follow up with cardiology and PCP for zio patch and HTN.        Objective     Last Recorded Vitals  BP (!) 163/102 (BP Location: Right arm, Patient Position: Lying)   Pulse 72   Temp 36.5 °C (97.7 °F) (Temporal)   Resp 18   Wt 111 kg (245 lb)   SpO2 100%   Intake/Output last 3 Shifts:  No intake or output data in the 24 hours ending 08/20/24 0851    Admission Weight  Weight: 111 kg (245 lb) (08/17/24 1758)    Daily Weight  08/17/24 : 111 kg (245 lb)    Image Results  Transthoracic Echo (TTE) Complete     [facility], [address] [place] [address]                 Tel [phone] and Fax [phone]    TRANSTHORACIC ECHOCARDIOGRAM REPORT       Patient Name:      LAILA AGUERO Reading Physician:    [id number] Dex Rivera MD  Study Date:        8/19/2024            Ordering Provider:    [id number] JAKUB LOVE  MRN/PID:           [id number]             Fellow:  Accession#:        [id number]         Nurse:                Catherine Augustin RN  Date of Birth/Age: 1990 / 33      Sonographer:          Vito cr                                      MINAL  Gender:            F                    Additional Staff:  Height:            167.64 cm            Admit Date:           8/17/2024  Weight:            111.13 kg            Admission Status:     Inpatient - STAT  BSA / BMI:         2.18 m2 / 39.54      Encounter#:           [id number]                     kg/m2  Blood Pressure:    130/75 mmHg          Department Location:  [facility] [facility]                                                                T8    Study Type:    TRANSTHORACIC ECHO (TTE) COMPLETE  Diagnosis/ICD: Abnormal electrocardiogram [ECG] [EKG]-R94.31  Indication:    Atrial fib/flutter  CPT Code:      Echo Complete w Full Doppler-[id number]    Patient History:  Pertinent History: HTN, ETOH, Afib/flutter, Closed right ankle fracture,                     Trimalleolar fracture of ankle, closed, right,.    Study Detail: The following Echo studies were performed: 2D, M-Mode, Doppler and                color flow. Definity used as a contrast agent for endocardial                border definition. Total contrast used for this procedure was 1.0                mL via IV push.       PHYSICIAN INTERPRETATION:  Left Ventricle: Left ventricular ejection fraction is normal, by visual estimate at 70-75%. There are no regional left ventricular wall motion abnormalities. The left ventricular cavity size is normal. Spectral Doppler shows a normal pattern of left ventricular diastolic filling. There is a mid cavity left ventricular obstruction. The resting gradient is 26 mmHg.  Left Atrium: The left atrium is normal in size.  Right Ventricle: The right ventricle is normal in size. There is normal right ventricular global systolic function.  Right Atrium: The right atrium is normal in size.  Aortic Valve: The aortic valve is probably trileaflet. The aortic valve dimensionless index is 1.05. There is no evidence of aortic valve regurgitation. The peak instantaneous gradient of the aortic valve is 8.5 mmHg. The mean gradient of the aortic valve is 5.0 mmHg.  Mitral Valve: The mitral valve is normal in structure. There is no evidence of mitral valve regurgitation.  Tricuspid Valve: The tricuspid valve is structurally normal. There is trace tricuspid regurgitation. The right ventricular systolic pressure is unable to be estimated.  Pulmonic Valve: The pulmonic valve is not well visualized. The pulmonic valve  regurgitation was not well visualized.  Pericardium: There is no pericardial effusion noted.  Aorta: The aortic root is normal.  In comparison to the previous echocardiogram(s): There are no prior studies on this patient for comparison purposes.       CONCLUSIONS:   1. Left ventricular ejection fraction is normal, by visual estimate at 70-75%.   2. There is a mid cavity left ventricular obstruction.   3. There is normal right ventricular global systolic function.    QUANTITATIVE DATA SUMMARY:  2D MEASUREMENTS:                        Normal Ranges:  LVEDV Index: 58 ml/m2    LA VOLUME:                                Normal Ranges:  LA Vol A4C:        47.1 ml    (22+/-6mL/m2)  LA Vol A2C:        54.9 ml  LA Vol BP:         50.9 ml  LA Vol Index A4C:  21.6ml/m2  LA Vol Index A2C:  25.2 ml/m2  LA Vol Index BP:   23.3 ml/m2  LA Area A4C:       17.7 cm2  LA Area A2C:       19.1 cm2  LA Major Axis A4C: 5.6 cm  LA Major Axis A2C: 5.6 cm    RA VOLUME BY A/L METHOD:                        Normal Ranges:  RA Area A4C: 12.2 cm2    LV SYSTOLIC FUNCTION BY 2D PLANIMETRY (MOD):                       Normal Ranges:  EF-A4C View:    61 % (>=55%)  EF-A2C View:    62 %  EF-Biplane:     61 %  EF-Visual:      73 %  LV EF Reported: 73 %    LV DIASTOLIC FUNCTION:                          Normal Ranges:  MV Peak E:    0.88 m/s  (0.7-1.2 m/s)  MV Peak A:    0.73 m/s  (0.42-0.7 m/s)  E/A Ratio:    1.21      (1.0-2.2)  MV e'         0.103 m/s (>8.0)  MV lateral e' 0.13 m/s  MV medial e'  0.07 m/s  E/e' Ratio:   8.52      (<8.0)    MITRAL VALVE:                  Normal Ranges:  MV DT: 185 msec (150-240msec)    AORTIC VALVE:                                    Normal Ranges:  AoV Vmax:                1.46 m/s (<=1.7m/s)  AoV Peak P.5 mmHg (<20mmHg)  AoV Mean P.0 mmHg (1.7-11.5mmHg)  LVOT Max Waldemar:            1.25 m/s (<=1.1m/s)  AoV VTI:                 19.60 cm (18-25cm)  LVOT VTI:                20.60 cm  LVOT  Diameter:           2.03 cm  (1.8-2.4cm)  AoV Area, VTI:           3.40 cm2 (2.5-5.5cm2)  AoV Area,Vmax:           2.77 cm2 (2.5-4.5cm2)  AoV Dimensionless Index: 1.05       RIGHT VENTRICLE:  RV Basal 3.20 cm  RV Mid   2.20 cm  RV Major 8.2 cm  TAPSE:   19.4 mm  RV s'    0.14 m/s    PULMONIC VALVE:                       Normal Ranges:  PV Max Waldemar: 1.1 m/s  (0.6-0.9m/s)  PV Max P.6 mmHg       [id number] Dex Rivera MD  Electronically signed on 2024 at 3:59:27 PM       ** Final **  ECG 12 Lead  Normal sinus rhythm  Inferior infarct , age undetermined  Abnormal ECG  When compared with ECG of 17-AUG-2024 22:56,  Nonspecific T wave abnormality no longer evident in Lateral leads      Physical Exam  Constitutional:       Appearance: Normal appearance.   Cardiovascular:      Rate and Rhythm: Normal rate and regular rhythm.   Pulmonary:      Effort: Pulmonary effort is normal.      Breath sounds: Normal breath sounds.   Abdominal:      General: Abdomen is flat.      Palpations: Abdomen is soft.   Neurological:      General: No focal deficit present.      Mental Status: She is alert and oriented to person, place, and time.   Psychiatric:         Mood and Affect: Mood normal.         Behavior: Behavior normal.            Assessment/Plan      Rita Mckeon is a 33 y.o. female  with PMHx of HTN presenting with ankle fracture. Underwent  ORIF R ankle with orthopedic surgery on . Medicine is being consulted for intra-op afib. Patient has been stable since and in NSR since intra-op a-fib.   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 Recommend patient follow up with outpatient cardiology for both zio patch read and TTE findings. Recommend to start 6.25 mg BID of carvedilol upon discharge.     #Intra-op afib  ::[id number] 1  ::No known hx of afib, currently in NSR  ::TSH -> 1.25  ::TTE showing mid left ventricular cavity outflow tract obstruction   - recommend Zio  Patch for cardiac monitoring  - Recommend follow up outpatient with PCP and Cardiology  - start 6.25 mg BID of carvedilol on discharge    #HTN  Patient has known chronic hypertension but is not taking any medication for control.   - start 6.25mg BID of carvedilol on discharge  - recommend PCP follow up     Matt Ramos DO

## 2024-08-20 NOTE — PROGRESS NOTES
"Orthopaedic Surgery Progress Note    S:  NAEO. Doing well, pain well controlled in context of recent surgery. Denies chest pain, dyspnea. Seen to have LV obstruction on Echo yesterday; patient reports that \"I had some heart complications at birth.\"    O:  BP (!) 179/114 (BP Location: Right arm, Patient Position: Lying)   Pulse 71   Temp 36.5 °C (97.7 °F) (Temporal)   Resp 18   Ht 1.676 m (5' 6\")   Wt 111 kg (245 lb)   LMP 08/04/2024 (Approximate)   SpO2 100%   BMI 39.54 kg/m²     Gen: arousable, NAD, appropriately conversational  Cardiac: RRR to peripheral palpation  Resp: nonlabored on RA  GI: soft, nondistended    MSK:  Right Lower Extremity:   -Dressing cdi  - In SLS  -Fires EHL/FHL  -SILT in saph/sural/SPN/DPN distributions  -Foot warm, well perfused  -Palpable DP pulse, brisk cap refill  -Compartments soft and compressible    A/P: 33 y.o. female  presents w/ R trimal ankle fx dislocation now s/p R ankle ORIF on 8/18/2024 with Dr. Blackburn.     Plan:  - tele for intra-op afib (has not recurred); echo completed with LV obstruction. Medicine to see this AM to provide formal recommendation  - 14 day Zio patch on discharge with routing to PCP  - RFP complete, K repleted  - Weight bearing: NWB RLE in SLS  - DVT ppx: SCDs, ASA 81 BID  - Diet: Regular  - Pain: Tylenol, oxycodone 5/10  - Antibiotics: perioperative ancef 2g q8hr x3 doses  - FEN: HLIV with good PO intake  - Bowel Regimen: Colace, senna, dulcolax  - PT/OT  - Pulm: Encourage IS  - Continue home medications  - No vazquez    Dispo: pending final Medicine recommendations; anticipate DC home this afternoon    This patient will be followed by Ortho Trauma team (All chat preferred):     1st call: Jason Torres, PGY-1  1st call: Gold Katz, PGY-1  2nd call: Barrington Degroot, PGY-2  3rd call: Rober Lopez, PGY-3      "

## 2024-08-20 NOTE — DISCHARGE SUMMARY
ORTHOPAEDIC SURGERY DISCHARGE SUMMARY    Discharge Diagnosis  Trimalleolar fracture of ankle, closed, right, initial encounter    Issues Requiring Follow-Up  Routine Postoperative Followup    Test Results Pending At Discharge  Pending Labs       No current pending labs.            Hospital Course  33 y.o. year-old female who presented with right trimalleolar ankle fracture. Patient is now s/p ORIF right ankle on 8/18/24 with Dr. Everett Blackburn . On the day of surgery, patient was identified in the pre-operative holding area and agreeable to proceed with surgery. Written consent was obtained.  Please see operative note for further details of this procedure. Patient received 24 hours of sara-operative antibiotics. Patient recovered in the PACU before transfer to a regular nursing floor. Patient was started on oxycodone and Tylenol for pain control. Physical therapy recommended continued recovery at home with continued physical therapy and wound care. On the day of discharge, patient was afebrile with stable vital signs. Patient was neurovascularly intact at time of discharge. Patient was discharged with prescription of Asprin 81mg BID for DVT prophylaxis for 4 weeks. Patient will follow-up with Dr. Everett Blackburn  in 2 weeks for a postoperative visit.     Physical Exam:   MSK:  Right Lower Extremity:   Gen: arousable, NAD, appropriately conversational  Cardiac: RRR to peripheral palpation  Resp: nonlabored on RA  GI: soft, nondistended     MSK:  Right Lower Extremity:   -Dressing cdi  - In SLS  -Fires EHL/FHL  -SILT in saph/sural/SPN/DPN distributions  -Foot warm, well perfused  -Palpable DP pulse, brisk cap refill  -Compartments soft and compressible    Home Medications     Medication List      START taking these medications     acetaminophen 325 mg tablet; Commonly known as: Tylenol; Take 2 tablets   (650 mg) by mouth every 6 hours if needed for mild pain (1 - 3) for up to   15 days.   aspirin 81 mg EC tablet; Take 1  tablet (81 mg) by mouth 2 times a day.   carvedilol 6.25 mg tablet; Commonly known as: Coreg; Take 1 tablet (6.25   mg) by mouth 2 times a day.   docusate sodium 100 mg capsule; Commonly known as: Colace; Take 1   capsule (100 mg) by mouth 2 times a day as needed for constipation for up   to 15 days.   oxyCODONE 5 mg immediate release tablet; Commonly known as: Roxicodone;   Take 1 tablet (5 mg) by mouth every 4 hours if needed for severe pain (7 -   10) or moderate pain (4 - 6) for up to 7 days.   Oysco 500/D 500 mg-5 mcg (200 unit) tablet; Generic drug: calcium   carbonate-vitamin D3; Take 1 tablet by mouth 2 times a day.     STOP taking these medications     loratadine 10 mg tablet; Commonly known as: Claritin   Prenatal Vitamin 27 mg iron- 0.8 mg tablet; Generic drug: prenatal   vitamin (iron-folic)       Outpatient Follow-Up  Future Appointments   Date Time Provider Department Center   9/6/2024  9:00 AM Anabella Gibson PA-C [id number] Academic         Kash Torers MD   Orthopedic Surgery PGY1  [facility]

## 2024-08-27 ENCOUNTER — TELEPHONE (OUTPATIENT)
Dept: ORTHOPEDIC SURGERY | Facility: HOSPITAL | Age: 34
End: 2024-08-27
Payer: COMMERCIAL

## 2024-08-27 NOTE — TELEPHONE ENCOUNTER
Copied from CRM #[id number]. Topic: Information Request - Doctor, Hospital, or Provider  >> Aug 27, 2024 12:09 PM Alicia ALBERTO wrote:  Ms. Petterson called asking for medical advice regarding the cast on her right leg feels comfortable and irritated.

## 2024-08-29 ENCOUNTER — SOCIAL WORK (OUTPATIENT)
Dept: PEDIATRICS | Facility: CLINIC | Age: 34
End: 2024-08-29
Payer: COMMERCIAL

## 2024-08-29 LAB
ATRIAL RATE: 60 BPM
P AXIS: 42 DEGREES
P OFFSET: 185 MS
P ONSET: 143 MS
PR INTERVAL: 158 MS
Q ONSET: 222 MS
QRS COUNT: 10 BEATS
QRS DURATION: 66 MS
QT INTERVAL: 400 MS
QTC CALCULATION(BAZETT): 400 MS
QTC FREDERICIA: 400 MS
R AXIS: -4 DEGREES
T AXIS: -3 DEGREES
T OFFSET: 422 MS
VENTRICULAR RATE: 60 BPM

## 2024-08-29 NOTE — PROGRESS NOTES
Date Seen: 08/29/24     Medical Staff Referring: Ruth Khan MD     Med staff reason for referral: Counseling  X    Housing      Clothing     Food X   Baby Needs    School     Legal  X Transportation  Other - Possible RISE candidate        Concerns presented by pt and family: Pt presented with two siblings and mother.  No concerns with pt but MOC expressed that she drinks 4 margaritas per day.  She shared that she's been stressed at home due to breaking her ankle and having no help at home.  MOC shared that she's never experienced PPD with her two old children but shared that she might be experiencing now.          BENJAMIN assessment: [facility] SW was given the referral due to MOC's alcohol consumption.  [facility]VA SW met with MOC to introduce self and RISE program.  SW discussed with MOC her alcohol intake.  MOC shared that she has been drinking more since she broke her ankle - probably because she's stressed at home with little help from her family members.  SW asked her if her alcohol drinking was negatively impacting her life right now, she shared no.  SW also asked pt if she thought AOD would be beneficial - pt shared no but was interested in MH counseling.  SW shared that MOC doesn't meet criteria for RISE program but will provide her with additional resources.     Met with pt and  mother (Rita Mckeon -   [phone]) on this day at provider's request. SW introduced self and purpose of visit.  MOC identified as current needs as a food for life referral (referral placed by doctor), information on  and MH resources.  SW asked if MOC had ever engaged in MH treatment in the past.  MOC shared that she hadn't but is receptive to it now.  SW provided a list of MH resources for pt to decide from.  MOC decided on [facility].  SW will send referral over to resources.  BENJAMIN also provided MOC, information on food bank across the [facility] and discussed how to make an appointment for [facility] for Life.   BENJAMIN also provided and discussed with the MOC the [facility] packet.          Assessed family for other needs. None noted. Contact information was shared with the family for future needs and follow up. Family gave verbal consent for referral.         Follow up plan:    BENJAMIN to make referral _X___  SW will check in at next pt exam ____  SW will contact family ____  Family will contact BENJAMIN with any future needs __X__

## 2024-08-30 ENCOUNTER — PHARMACY VISIT (OUTPATIENT)
Dept: PHARMACY | Facility: CLINIC | Age: 34
End: 2024-08-30
Payer: MEDICAID

## 2024-08-30 ENCOUNTER — OFFICE VISIT (OUTPATIENT)
Dept: ORTHOPEDIC SURGERY | Facility: HOSPITAL | Age: 34
End: 2024-08-30
Payer: COMMERCIAL

## 2024-08-30 ENCOUNTER — HOSPITAL ENCOUNTER (OUTPATIENT)
Dept: RADIOLOGY | Facility: HOSPITAL | Age: 34
Discharge: HOME | End: 2024-08-30
Payer: COMMERCIAL

## 2024-08-30 DIAGNOSIS — S82.891A CLOSED RIGHT ANKLE FRACTURE, INITIAL ENCOUNTER: ICD-10-CM

## 2024-08-30 DIAGNOSIS — S82.851A TRIMALLEOLAR FRACTURE OF ANKLE, CLOSED, RIGHT, INITIAL ENCOUNTER: ICD-10-CM

## 2024-08-30 DIAGNOSIS — S82.891A CLOSED RIGHT ANKLE FRACTURE, INITIAL ENCOUNTER: Primary | ICD-10-CM

## 2024-08-30 PROBLEM — O99.810 PREGNANCY WITH ABNORMAL GLUCOSE TOLERANCE TEST (GTT) (HHS-HCC): Status: ACTIVE | Noted: 2024-08-30

## 2024-08-30 PROCEDURE — RXMED WILLOW AMBULATORY MEDICATION CHARGE

## 2024-08-30 PROCEDURE — 99211 OFF/OP EST MAY X REQ PHY/QHP: CPT | Performed by: PHYSICIAN ASSISTANT

## 2024-08-30 PROCEDURE — 73610 X-RAY EXAM OF ANKLE: CPT | Mod: RT

## 2024-08-30 PROCEDURE — L4361 PNEUMA/VAC WALK BOOT PRE OTS: HCPCS | Performed by: PHYSICIAN ASSISTANT

## 2024-08-30 RX ORDER — DOCUSATE SODIUM 100 MG/1
100 CAPSULE, LIQUID FILLED ORAL 2 TIMES DAILY PRN
Qty: 30 CAPSULE | Refills: 0 | Status: CANCELLED | OUTPATIENT
Start: 2024-08-30 | End: 2024-09-14

## 2024-08-30 RX ORDER — ACETAMINOPHEN 325 MG/1
650 TABLET ORAL EVERY 6 HOURS PRN
Qty: 120 TABLET | Refills: 0 | Status: SHIPPED | OUTPATIENT
Start: 2024-08-30 | End: 2024-09-19

## 2024-08-30 RX ORDER — ACETAMINOPHEN 325 MG/1
650 TABLET ORAL EVERY 6 HOURS PRN
Qty: 120 TABLET | Refills: 0 | Status: CANCELLED | OUTPATIENT
Start: 2024-08-30 | End: 2024-09-14

## 2024-08-30 RX ORDER — HYDROCORTISONE 25 MG/G
OINTMENT TOPICAL
COMMUNITY
Start: 2024-02-14

## 2024-08-30 RX ORDER — PETROLATUM 420 MG/G
OINTMENT TOPICAL
COMMUNITY
Start: 2024-02-14

## 2024-08-30 RX ORDER — OXYCODONE HYDROCHLORIDE 5 MG/1
5 TABLET ORAL EVERY 4 HOURS PRN
Qty: 42 TABLET | Refills: 0 | Status: CANCELLED | OUTPATIENT
Start: 2024-08-30 | End: 2024-09-06

## 2024-08-30 RX ORDER — DOCUSATE SODIUM 100 MG/1
100 CAPSULE, LIQUID FILLED ORAL 2 TIMES DAILY PRN
Qty: 30 CAPSULE | Refills: 0 | Status: SHIPPED | OUTPATIENT
Start: 2024-08-30 | End: 2024-09-19

## 2024-08-30 RX ORDER — OXYCODONE HYDROCHLORIDE 5 MG/1
5 TABLET ORAL EVERY 6 HOURS PRN
Qty: 28 TABLET | Refills: 0 | Status: SHIPPED | OUTPATIENT
Start: 2024-08-30 | End: 2024-09-06

## 2024-09-03 NOTE — PROGRESS NOTES
History of Present Illness   Rita Mckeon is a 33 y.o. female presenting today for post-op check from ORIF R ankle fx on 8/18/24. Overall doing ok. Has mild pain that is requiring narcotics for pain control. Rates pain a 6/10. Incisions are well healing without redness or drainage. Compliant with WB recommendations. Denies numbness, tingling, f/c, CP, SOB or any other complaints or concerns.      Past Medical History:   Diagnosis Date    Abnormal Pap smear of cervix     Chronic hypertension 10/12/2023       No meds    Eczema     Hypertension     Obesity in pregnancy ([facility])     Refractive error        Medication Documentation Review Audit       Reviewed by Maren Cummins MA (Medical Assistant) on 08/30/24 at 1032      Medication Order Taking? Sig Documenting Provider Last Dose Status   acetaminophen (Tylenol) 325 mg tablet [id number] Yes Take 2 tablets (650 mg) by mouth every 6 hours if needed for mild pain (1 - 3) for up to 15 days. Barrington Degroot MD Taking Active   aspirin 81 mg EC tablet [id number] Yes Take 1 tablet (81 mg) by mouth 2 times a day. Barrington Degroot MD Taking Active   calcium carbonate-vitamin D3 500 mg-5 mcg (200 unit) tablet [id number] Yes Take 1 tablet by mouth 2 times a day. Barrington Degroot MD Taking Active   carvedilol (Coreg) 6.25 mg tablet [id number] Yes Take 1 tablet (6.25 mg) by mouth 2 times a day. Barrington Degroot MD Taking Active   docusate sodium (Colace) 100 mg capsule [id number] Yes Take 1 capsule (100 mg) by mouth 2 times a day as needed for constipation for up to 15 days. Barrington Degroot MD Taking Active   oxyCODONE (Roxicodone) 5 mg immediate release tablet [id number]  Take 1 tablet (5 mg) by mouth every 4 hours if needed for severe pain (7 - 10) or moderate pain (4 - 6) for up to 7 days. Barrington Degroot MD  Active                    No Known Allergies    Social History     Socioeconomic History    Marital status: Single     Spouse name: Not on file    Number of children: Not on file     Years of education: High School Graduate    Highest education level: Not on file   Occupational History    Occupation: Housekeeping   Tobacco Use    Smoking status: Every Day     Types: Cigars    Smokeless tobacco: Never   Vaping Use    Vaping status: Never Used   Substance and Sexual Activity    Alcohol use: Yes     Alcohol/week: 14.0 standard drinks of alcohol     Types: 14 Standard drinks or equivalent per week    Drug use: Yes     Types: Marijuana    Sexual activity: Not Currently     Partners: Male     Birth control/protection: None   Other Topics Concern    Not on file   Social History Narrative    Not on file     Social Determinants of Health     Financial Resource Strain: Low Risk  (8/19/2024)    Overall Financial Resource Strain (CARDIA)     Difficulty of Paying Living Expenses: Not very hard   Food Insecurity: Not on file   Transportation Needs: No Transportation Needs (8/19/2024)    PRAPARE - Transportation     Lack of Transportation (Medical): No     Lack of Transportation (Non-Medical): No   Physical Activity: Not on file   Stress: Not on file   Social Connections: Not on file   Intimate Partner Violence: Not on file       No past surgical history on file.       Review of Systems:  30 point ROS reviewed and negative other than as listed in the HPI     Physical Exam:  Gen: The pt is A&Ox3, NAD, and appear state age and weight  Psychiatric: mood and affect are appropriate   Eyes: sclera are white, EOM grossly intact  ENT: MMM  Neck: supple, thyroid is midline  Respiratory: respirations are nonlabored, chest rise symmetric  CV: rate is regular by palpation of distal pulses  Abdomen: nondistended   Integument: no obvious cutaneous lesions noted. No signs of lymphangitis. No signs of systemic edema.   MSK: right ankle surgical incision well healing without edema, erythema, drainage, or other s/sx of infection. Appropriately tender to palpation around the incision. SILT throughout the leg intact. Intact  plantarflexion and dorsiflexion. Foot warm and well perfused.      Imaging:  I personally reviewed multiple views of the right ankle were obtained in the office today demonstrate maintenance of reduction, interval healing, and a stable position of the hardware.      Assessment   33 y.o. female post-op from ORIF R ankle fx on 8/18/24.     Plan:  Continue NWB on  RLE; transition to a boot to allow for therapy for ROM as tolerated   Incisions well healing; sutures/staples removed in office and steri's placed with wound care instructed   Continue DVT ppx and vit d/calcium for bone health   Pt requesting refill of narcotics. OARSS was reviewed and not concerning for signs of abuse thus in setting of acute post-operative period patient provided a refill after counseling on risk of addiction.      Patient was prescribed a CAM boot for right ankle injury. This mobility device is required for the following reasons:     1. The patient has a mobility limitation that significantly impairs their ability to participate in one or more mobility-related activities of daily living (MRADL) in the home; and  2. The patient is able to safely use the mobility device; and  3. The functional mobility deficit can be sufficiently resolved with use of the mobility device     Verbal and written instructions for the use, wear schedule, cleaning and application of this item were given. Education provided also included gait training and safety precautions when using this device. Patient was instructed that should the item result in increased pain, decreased sensation, increased swelling, or an overall worsening of their medical condition, to please contact our office immediately.      Follow-up 1 month with 3 views right ankle.     All of the patient's questions/concerns address and they are in agreement with the plan.

## 2024-09-04 ENCOUNTER — PHARMACY VISIT (OUTPATIENT)
Dept: PHARMACY | Facility: CLINIC | Age: 34
End: 2024-09-04

## 2024-09-04 ENCOUNTER — APPOINTMENT (OUTPATIENT)
Dept: ORTHOPEDIC SURGERY | Facility: CLINIC | Age: 34
End: 2024-09-04
Payer: COMMERCIAL

## 2024-09-06 ENCOUNTER — APPOINTMENT (OUTPATIENT)
Dept: ORTHOPEDIC SURGERY | Facility: HOSPITAL | Age: 34
End: 2024-09-06
Payer: COMMERCIAL

## 2024-09-09 ENCOUNTER — TELEPHONE (OUTPATIENT)
Dept: SURGERY | Facility: CLINIC | Age: 34
End: 2024-09-09
Payer: COMMERCIAL

## 2024-09-09 DIAGNOSIS — S82.851A TRIMALLEOLAR FRACTURE OF ANKLE, CLOSED, RIGHT, INITIAL ENCOUNTER: ICD-10-CM

## 2024-09-09 NOTE — TELEPHONE ENCOUNTER
RN attempted to call patient to go over information prior to your scheduled visit with Dr. Bridges on 9/11/24 at 2:00 pm at [facility] (inside [facility], main floor in the Specialty [facility]). Please arrive at 12:00 pm for New Bariatric class taught by Dr. Bridges and RN.   Parking is available at a cost of $5.00 at the Main Entrance.  Please give RN a call back at [phone]. Thank you.

## 2024-09-10 ENCOUNTER — PHARMACY VISIT (OUTPATIENT)
Dept: PHARMACY | Facility: CLINIC | Age: 34
End: 2024-09-10
Payer: MEDICAID

## 2024-09-10 PROCEDURE — RXMED WILLOW AMBULATORY MEDICATION CHARGE

## 2024-09-10 RX ORDER — OXYCODONE HYDROCHLORIDE 5 MG/1
5 TABLET ORAL EVERY 6 HOURS PRN
Qty: 28 TABLET | Refills: 0 | Status: SHIPPED | OUTPATIENT
Start: 2024-09-10 | End: 2024-09-17

## 2024-09-10 NOTE — TELEPHONE ENCOUNTER
Pt called for pain medicine. DOS 8/18/24. Rates pain a 6/10. I have personally reviewed the OARRS report for the patient, no issues identified. This report is scanned into the EMR. I have considered the risks of abuse, dependence, addiction, and diversion. The 7 day Rx sent to pharmacy on file. DERIC CABRERA

## 2024-09-11 ENCOUNTER — APPOINTMENT (OUTPATIENT)
Dept: SURGERY | Facility: CLINIC | Age: 34
End: 2024-09-11
Payer: COMMERCIAL

## 2024-09-12 ENCOUNTER — APPOINTMENT (OUTPATIENT)
Dept: SURGERY | Facility: CLINIC | Age: 34
End: 2024-09-12
Payer: COMMERCIAL

## 2024-09-13 PROCEDURE — RXMED WILLOW AMBULATORY MEDICATION CHARGE

## 2024-09-15 DIAGNOSIS — S82.851A TRIMALLEOLAR FRACTURE OF ANKLE, CLOSED, RIGHT, INITIAL ENCOUNTER: ICD-10-CM

## 2024-09-16 PROCEDURE — RXMED WILLOW AMBULATORY MEDICATION CHARGE

## 2024-09-16 RX ORDER — OXYCODONE HYDROCHLORIDE 5 MG/1
5 TABLET ORAL EVERY 6 HOURS PRN
Qty: 28 TABLET | Refills: 0 | Status: SHIPPED | OUTPATIENT
Start: 2024-09-16 | End: 2024-09-24

## 2024-09-16 NOTE — TELEPHONE ENCOUNTER
Pt called for pain medicine. DOS 8/18/2024. Rates pain a 7/10. I have personally reviewed the OARRS report for the patient, no issues identified. This report is scanned into the EMR. I have considered the risks of abuse, dependence, addiction, and diversion. The 7 day Rx sent to pharmacy on file. DERIC CABRERA

## 2024-09-17 ENCOUNTER — PHARMACY VISIT (OUTPATIENT)
Dept: PHARMACY | Facility: CLINIC | Age: 34
End: 2024-09-17
Payer: MEDICAID

## 2024-09-23 ENCOUNTER — PHARMACY VISIT (OUTPATIENT)
Dept: PHARMACY | Facility: CLINIC | Age: 34
End: 2024-09-23
Payer: MEDICAID

## 2024-09-23 DIAGNOSIS — S82.851A TRIMALLEOLAR FRACTURE OF ANKLE, CLOSED, RIGHT, INITIAL ENCOUNTER: ICD-10-CM

## 2024-09-23 PROCEDURE — RXMED WILLOW AMBULATORY MEDICATION CHARGE

## 2024-09-23 RX ORDER — OXYCODONE HYDROCHLORIDE 5 MG/1
5 TABLET ORAL EVERY 8 HOURS PRN
Qty: 21 TABLET | Refills: 0 | Status: SHIPPED | OUTPATIENT
Start: 2024-09-23 | End: 2024-09-30

## 2024-09-25 ENCOUNTER — APPOINTMENT (OUTPATIENT)
Dept: SURGERY | Facility: CLINIC | Age: 34
End: 2024-09-25
Payer: COMMERCIAL

## 2024-09-27 ENCOUNTER — OFFICE VISIT (OUTPATIENT)
Dept: ORTHOPEDIC SURGERY | Facility: HOSPITAL | Age: 34
End: 2024-09-27
Payer: COMMERCIAL

## 2024-09-27 ENCOUNTER — HOSPITAL ENCOUNTER (OUTPATIENT)
Dept: RADIOLOGY | Facility: HOSPITAL | Age: 34
Discharge: HOME | End: 2024-09-27
Payer: COMMERCIAL

## 2024-09-27 DIAGNOSIS — S82.891A CLOSED RIGHT ANKLE FRACTURE, INITIAL ENCOUNTER: ICD-10-CM

## 2024-09-27 DIAGNOSIS — S82.891A CLOSED RIGHT ANKLE FRACTURE, INITIAL ENCOUNTER: Primary | ICD-10-CM

## 2024-09-27 PROCEDURE — 73610 X-RAY EXAM OF ANKLE: CPT | Mod: RT

## 2024-09-27 PROCEDURE — 99211 OFF/OP EST MAY X REQ PHY/QHP: CPT | Performed by: PHYSICIAN ASSISTANT

## 2024-09-29 DIAGNOSIS — S82.851A TRIMALLEOLAR FRACTURE OF ANKLE, CLOSED, RIGHT, INITIAL ENCOUNTER: ICD-10-CM

## 2024-09-30 ENCOUNTER — PHARMACY VISIT (OUTPATIENT)
Dept: PHARMACY | Facility: CLINIC | Age: 34
End: 2024-09-30
Payer: MEDICAID

## 2024-09-30 PROCEDURE — RXMED WILLOW AMBULATORY MEDICATION CHARGE

## 2024-09-30 RX ORDER — OXYCODONE HYDROCHLORIDE 5 MG/1
5 TABLET ORAL EVERY 12 HOURS PRN
Qty: 14 TABLET | Refills: 0 | Status: SHIPPED | OUTPATIENT
Start: 2024-09-30 | End: 2024-10-07

## 2024-10-01 NOTE — PROGRESS NOTES
History of Present Illness   Rita Mckeon is a 33 y.o. female presenting today for post-op check from ORIF R ankle fx on 8/18/24. Continues to do well with minimal complaints of pain. Incisions are well healing without redness or drainage. Compliant with WB recommendations. Denies numbness, tingling, f/c, CP, SOB or any other complaints or concerns.      Past Medical History:   Diagnosis Date    Abnormal Pap smear of cervix     Chronic hypertension 10/12/2023       No meds    Eczema     Hypertension     Obesity in pregnancy ([facility])     Refractive error        Medication Documentation Review Audit       Reviewed by Maren Cummins MA (Medical Assistant) on 09/27/24 at 1019      Medication Order Taking? Sig Documenting Provider Last Dose Status   aspirin 81 mg EC tablet [id number] Yes Take 1 tablet (81 mg) by mouth 2 times a day. Barrington Degroot MD Taking Active   calcium carbonate-vitamin D3 500 mg-5 mcg (200 unit) tablet [id number] Yes Take 1 tablet by mouth 2 times a day. Barrington Degroot MD Taking Active   carvedilol (Coreg) 6.25 mg tablet [id number] Yes Take 1 tablet (6.25 mg) by mouth 2 times a day. Barrington Degroot MD Taking Active   hydrocortisone 2.5 % ointment [id number] Yes  Historical Provider, MD Taking Active     Discontinued 09/23/24 0731   oxyCODONE (Roxicodone) 5 mg immediate release tablet [id number] Yes Take 1 tablet (5 mg) by mouth every 8 hours if needed for severe pain (7 - 10) for up to 7 days. Anabella Gibson PA-C Taking Active   white petrolatum 42 % ointment ointment [id number] Yes  Historical Provider, MD Taking Active                    No Known Allergies    Social History     Socioeconomic History    Marital status: Single     Spouse name: Not on file    Number of children: Not on file    Years of education: High School Graduate    Highest education level: Not on file   Occupational History    Occupation: Housekeeping   Tobacco Use    Smoking status: Every Day     Types: Cigars    Smokeless  tobacco: Never   Vaping Use    Vaping status: Never Used   Substance and Sexual Activity    Alcohol use: Yes     Alcohol/week: 14.0 standard drinks of alcohol     Types: 14 Standard drinks or equivalent per week    Drug use: Yes     Types: Marijuana    Sexual activity: Not Currently     Partners: Male     Birth control/protection: None   Other Topics Concern    Not on file   Social History Narrative    Not on file     Social Determinants of Health     Financial Resource Strain: Low Risk  (8/19/2024)    Overall Financial Resource Strain (CARDIA)     Difficulty of Paying Living Expenses: Not very hard   Food Insecurity: Not on file   Transportation Needs: No Transportation Needs (8/19/2024)    PRAPARE - Transportation     Lack of Transportation (Medical): No     Lack of Transportation (Non-Medical): No   Physical Activity: Not on file   Stress: Not on file   Social Connections: Not on file   Intimate Partner Violence: Not on file       History reviewed. No pertinent surgical history.       Review of Systems:  30 point ROS reviewed and negative other than as listed in the HPI     Physical Exam:  Gen: The pt is A&Ox3, NAD, and appear state age and weight  Psychiatric: mood and affect are appropriate   Eyes: sclera are white, EOM grossly intact  ENT: MMM  Neck: supple, thyroid is midline  Respiratory: respirations are nonlabored, chest rise symmetric  CV: rate is regular by palpation of distal pulses  Abdomen: nondistended   Integument: no obvious cutaneous lesions noted. No signs of lymphangitis. No signs of systemic edema.   MSK: right ankle surgical incision well healing without edema, erythema, drainage, or other s/sx of infection. Appropriately tender to palpation around the incision. SILT throughout the leg intact. Intact plantarflexion and dorsiflexion. Foot warm and well perfused.      Imaging:  I personally reviewed multiple views of the right ankle were obtained in the office today demonstrate maintenance of  reduction, interval healing, and a stable position of the hardware.      Assessment   33 y.o. female post-op from ORIF R ankle fx on 8/18/24.     Plan:  Can advance to WBAT and wean boot as tolerated. Outpatient PT referral placed.      Follow-up 6 weeks with 3 views right ankle.     All of the patient's questions/concerns address and they are in agreement with the plan.

## 2024-10-06 DIAGNOSIS — S82.851A TRIMALLEOLAR FRACTURE OF ANKLE, CLOSED, RIGHT, INITIAL ENCOUNTER: ICD-10-CM

## 2024-10-07 DIAGNOSIS — S82.851A TRIMALLEOLAR FRACTURE OF ANKLE, CLOSED, RIGHT, INITIAL ENCOUNTER: ICD-10-CM

## 2024-10-07 RX ORDER — OXYCODONE HYDROCHLORIDE 5 MG/1
5 TABLET ORAL EVERY 12 HOURS PRN
Qty: 14 TABLET | Refills: 0 | OUTPATIENT
Start: 2024-10-07 | End: 2024-10-14

## 2024-10-09 ENCOUNTER — TELEPHONE (OUTPATIENT)
Dept: PEDIATRICS | Facility: CLINIC | Age: 34
End: 2024-10-09
Payer: COMMERCIAL

## 2024-10-09 NOTE — TELEPHONE ENCOUNTER
"Email received in regards to mental health counseling referral to [facility]. See below:    \"Hello team,    Unfortunately, Davidirving Mckeon declined services today when I attempted to reschedule the missed initial assessment. The client is being discharged.    Kindest Regards,    Juan Salguero        [address]    [place], [place] [address]    Ph:  [phone]    Fax: [phone]\"      "

## 2024-10-18 ENCOUNTER — APPOINTMENT (OUTPATIENT)
Dept: SURGERY | Facility: CLINIC | Age: 34
End: 2024-10-18
Payer: COMMERCIAL

## 2024-10-28 PROCEDURE — RXMED WILLOW AMBULATORY MEDICATION CHARGE

## 2024-10-30 ENCOUNTER — PHARMACY VISIT (OUTPATIENT)
Dept: PHARMACY | Facility: CLINIC | Age: 34
End: 2024-10-30
Payer: MEDICAID

## 2024-10-31 ENCOUNTER — APPOINTMENT (OUTPATIENT)
Dept: PHYSICAL THERAPY | Facility: HOSPITAL | Age: 34
End: 2024-10-31
Payer: COMMERCIAL

## 2024-11-05 ENCOUNTER — PATIENT MESSAGE (OUTPATIENT)
Dept: ORTHOPEDIC SURGERY | Facility: HOSPITAL | Age: 34
End: 2024-11-05
Payer: COMMERCIAL

## 2024-11-05 ENCOUNTER — APPOINTMENT (OUTPATIENT)
Dept: PHYSICAL THERAPY | Facility: HOSPITAL | Age: 34
End: 2024-11-05
Payer: COMMERCIAL

## 2024-12-06 ENCOUNTER — PHARMACY VISIT (OUTPATIENT)
Dept: PHARMACY | Facility: CLINIC | Age: 34
End: 2024-12-06
Payer: MEDICAID

## 2024-12-06 ENCOUNTER — OFFICE VISIT (OUTPATIENT)
Dept: ORTHOPEDIC SURGERY | Facility: HOSPITAL | Age: 34
End: 2024-12-06
Payer: COMMERCIAL

## 2024-12-06 ENCOUNTER — HOSPITAL ENCOUNTER (OUTPATIENT)
Dept: RADIOLOGY | Facility: HOSPITAL | Age: 34
Discharge: HOME | End: 2024-12-06
Payer: COMMERCIAL

## 2024-12-06 DIAGNOSIS — S82.891A CLOSED RIGHT ANKLE FRACTURE, INITIAL ENCOUNTER: Primary | ICD-10-CM

## 2024-12-06 DIAGNOSIS — S82.891A CLOSED RIGHT ANKLE FRACTURE, INITIAL ENCOUNTER: ICD-10-CM

## 2024-12-06 PROCEDURE — 99213 OFFICE O/P EST LOW 20 MIN: CPT | Performed by: PHYSICIAN ASSISTANT

## 2024-12-06 PROCEDURE — RXMED WILLOW AMBULATORY MEDICATION CHARGE

## 2024-12-06 PROCEDURE — L1902 AFO ANKLE GAUNTLET PRE OTS: HCPCS | Performed by: PHYSICIAN ASSISTANT

## 2024-12-06 PROCEDURE — 73610 X-RAY EXAM OF ANKLE: CPT | Mod: RT

## 2024-12-06 RX ORDER — IBUPROFEN 800 MG/1
800 TABLET ORAL 3 TIMES DAILY
Qty: 90 TABLET | Refills: 0 | Status: SHIPPED | OUTPATIENT
Start: 2024-12-06 | End: 2025-01-05

## 2024-12-06 ASSESSMENT — PAIN - FUNCTIONAL ASSESSMENT: PAIN_FUNCTIONAL_ASSESSMENT: NO/DENIES PAIN

## 2024-12-09 NOTE — PROGRESS NOTES
History of Present Illness   Rita Mckeon is a 33 y.o. female presenting today for post-op check from ORIF R ankle fx on 24. Has not started formal PT yet but has been doing exercises at home. Feels she is slowly progressing. Is still WB in a boot but mostly for her mental comfort. Ambulates at home without the boot occasionally without pain. Incisions well healed. No concerns.      Past Medical History:   Diagnosis Date    Abnormal Pap smear of cervix     Chronic hypertension 10/12/2023       No meds    Eczema     Hypertension     Obesity in pregnancy ([facility])     Refractive error        Medication Documentation Review Audit       Reviewed by Fer Gaming on 24 at 1038      Medication Order Taking? Sig Documenting Provider Last Dose Status   calcium carbonate-vitamin D3 500 mg-5 mcg (200 unit) tablet [id number] No Take 1 tablet by mouth 2 times a day. Barrington Degroot MD Taking  24 2359   carvedilol (Coreg) 6.25 mg tablet [id number] No Take 1 tablet (6.25 mg) by mouth 2 times a day. Barrington Degroot MD Taking Active   hydrocortisone 2.5 % ointment [id number] No  Historical Provider, MD Taking Active   white petrolatum 42 % ointment ointment [id number] No  Historical Provider, MD Taking Active                    No Known Allergies    Social History     Socioeconomic History    Marital status: Single     Spouse name: Not on file    Number of children: Not on file    Years of education: High School Graduate    Highest education level: Not on file   Occupational History    Occupation: Housekeeping   Tobacco Use    Smoking status: Every Day     Types: Cigars    Smokeless tobacco: Never   Vaping Use    Vaping status: Never Used   Substance and Sexual Activity    Alcohol use: Yes     Alcohol/week: 14.0 standard drinks of alcohol     Types: 14 Standard drinks or equivalent per week    Drug use: Yes     Types: Marijuana    Sexual activity: Not Currently     Partners: Male     Birth  control/protection: None   Other Topics Concern    Not on file   Social History Narrative    Not on file     Social Drivers of Health     Financial Resource Strain: Low Risk  (8/19/2024)    Overall Financial Resource Strain (CARDIA)     Difficulty of Paying Living Expenses: Not very hard   Food Insecurity: Not on file   Transportation Needs: No Transportation Needs (8/19/2024)    PRAPARE - Transportation     Lack of Transportation (Medical): No     Lack of Transportation (Non-Medical): No   Physical Activity: Not on file   Stress: Not on file   Social Connections: Not on file   Intimate Partner Violence: Not on file   Housing Stability: Not on file       History reviewed. No pertinent surgical history.       Review of Systems:  30 point ROS reviewed and negative other than as listed in the HPI     Physical Exam:  Gen: The pt is A&Ox3, NAD, and appear state age and weight  Psychiatric: mood and affect are appropriate   Eyes: sclera are white, EOM grossly intact  ENT: MMM  Neck: supple, thyroid is midline  Respiratory: respirations are nonlabored, chest rise symmetric  CV: rate is regular by palpation of distal pulses  Abdomen: nondistended   Integument: no obvious cutaneous lesions noted. No signs of lymphangitis. No signs of systemic edema.   MSK: right ankle surgical incision well healing without edema, erythema, drainage, or other s/sx of infection. Appropriately tender to palpation around the incision. SILT throughout the leg intact. Intact plantarflexion and dorsiflexion. Foot warm and well perfused.      Imaging:  I personally reviewed multiple views of the right ankle were obtained in the office today demonstrate maintenance of reduction, interval healing, and a stable position of the hardware.      Assessment   33 y.o. female post-op from ORIF R ankle fx on 8/18/24.     Plan:  Continue WBAT and wean boot as tolerated; encouraged to keep PT appointment.      Follow-up 3 months with 3 views right ankle.     All  of the patient's questions/concerns address and they are in agreement with the plan.

## 2024-12-12 ENCOUNTER — EVALUATION (OUTPATIENT)
Dept: PHYSICAL THERAPY | Facility: CLINIC | Age: 34
End: 2024-12-12
Payer: COMMERCIAL

## 2024-12-12 DIAGNOSIS — S82.891D RIGHT MALLEOLAR FRACTURE, CLOSED, WITH ROUTINE HEALING, SUBSEQUENT ENCOUNTER: ICD-10-CM

## 2024-12-12 PROCEDURE — 97110 THERAPEUTIC EXERCISES: CPT | Mod: GP | Performed by: PHYSICAL THERAPIST

## 2024-12-12 PROCEDURE — 97161 PT EVAL LOW COMPLEX 20 MIN: CPT | Mod: GP | Performed by: PHYSICAL THERAPIST

## 2024-12-12 NOTE — PROGRESS NOTES
Physical Therapy  Physical Therapy Orthopedic Evaluation    Patient Name: Rita Mckeon  MRN: [id number]  Today's Date: 12/12/2024  Time Calculation  Start Time: 1600  Stop Time: 1640  Time Calculation (min): 40 min    PT Evaluation Time Entry  PT Evaluation (Low) Time Entry: 15  PT Therapeutic Procedures Time Entry  Therapeutic Exercise Time Entry: 23       Insurance:  Visit number: 1  Authorization info: Auth after 8  Insurance Type: Payor: [employer] / Plan: [employer] / Product Type: *No Product type* /      Current Problem     Problem List Items Addressed This Visit             ICD-10-CM    Closed right ankle fracture, initial encounter S82.891A       Surgery:right trimalleolar ankle fracture. Patient is now s/p ORIF right ankle on 8/18/24 with Dr. Everett Blackburn     Referred by: Anabella Gibson PA-C    Precautions:   Continue WBAT and wean boot as tolerated  Subjective:   Subjective   Chief Complaint: R ankle Injury  Onset: 8/17  IVETTE: Fell down 5 steps    General:     Current Condition:   Better, weaned out of boot, feels stiff. Very careful. Occasional spasms     Pain:     Location: R ankle  Rating: Best-2, Current-4, Worst-7  Description: Achy pain and also sharp  Aggravating Factors: Walking, stairs, squatting  Relieving Factors: Rest, ice    Relevant Information (PMH & Previous Tests/Imaging): maintenance of reduction, interval healing, and a stable position of the hardware.     Previous Interventions/Treatments: None    Prior Level of Function (PLOF)  Patient previously independent with all ADLs  Exercise/Physical Activity: active with children, walking for exercise   Work/School: Currently not working     Patients Living Environment: Reviewed and no concern  Primary Language: English  Patient's Goal(s) for Therapy: to walk better and move around better    Red Flags: Do you have any of the following? No  Fever/chills, unexplained weight changes,  dizziness/fainting, unexplained change in bowel or bladder functions, unexplained malaise or muscle weakness, night pain/sweats, numbness or tingling    Objective:  Objective   23cm bimall  53cm fig 8    Palpation/Observation  Lateral aspect incisional area  Posture  Presents with standard shoe and also lace up ankle brace  Special Testing    ROM  12/12/2024  -4 degrees active dorsiflexion, passively neutral dorsiflexion, actively 40 degrees plantarflexion, inversion 25 degrees, eversion actively neutral  Strength  12/12/2024  3/5 plantarflexion dorsiflexion, 3+/5 inversion, knee and hip 4/5 flexion extension  Sensation  Full sensation  Reflexes      Transfers: Utilization of bilateral upper extremity assist sit to stand and supine to sit  Gait: Mildly antalgic without assistive device, decreased heel strike to toe off  SL Balance:   DL Squat: Sit to stand bilateral upper extremity assist with limited depth  SL Squat:      Outcome Measures:  Other Measures  Lower Extremity Funtional Score (LEFS): 41/80   12/12/2024  Treatments:  Ther-EX:  Seated towel gastroc and soleus stretching  Standing gastroc stretching  Resisted green band ankle plantarflexion and dorsiflexion  Ankle alphabet  Seated heel and toe raise  Reviewed gait mechanics with treadmill walking and single right lower extremity performing heel-to-toe pattern    Manual:  Gentle dorsiflexion mobilizations grade 2  Modalities:  Discussed utilization of ice and also compression stockings    PT Evaluation Time Entry  PT Evaluation (Low) Time Entry: 15  PT Therapeutic Procedures Time Entry  Therapeutic Exercise Time Entry: 23       EDUCATION: Home exercise program, plan of care, activity modifications, pain management, and injury pathology     Code: [id number]     Medical History Form: Reviewed (scanned into chart)  Reviewed medical form, [place], [place] risk, Oriental orthodox beliefs, PHQ     Screening  Frequency  Date Last Completed   Spiritual and Cultural Beliefs    Screening each visit or episode of care    Falls Risk Screening every ambulatory visit    Pain Screening annually at primary care visit  8/1/2023   Domestic Violence screening annually at primary care visit    Depression Screening annually in the primary care setting 8/18/2024   Suicide Risk Screening annually in the primary care setting 8/18/2024   Nutrition and Food Insecurity   Screening at least annually at primary care visit  8/20/2024   Key Learner annually in the primary care setting    Drug Screen  12/6/2024 10:39 AM   Alcohol Screen  12/6/2024 10:39 AM   Advance Directive       Time Calculation  Start Time: 1600  Stop Time: 1640  Time Calculation (min): 40 min  PT Evaluation Time Entry  PT Evaluation (Low) Time Entry: 15 PT Therapeutic Procedures Time Entry  Therapeutic Exercise Time Entry: 23          Assessment: Patient presents with signs and symptoms consistent with R ankle ORIF, resulting in limited participation in pain-free ADLs and inability to perform at their prior level of function. Pt would benefit from physical therapy to address the impairments found & listed previously in the objective section in order to return to safe and pain-free ADLs and prior level of function.     Complexity:Low  Prognosis: Good  Response to care: Good  Clinical Presentation: Stable and/or uncomplicated characteristics  Personal Factors: None    Problem List:  Pain  Function  Mobility  Strength  Plan:     Planned Interventions include: therapeutic exercise, self-care home management, manual therapy, therapeutic activities, gait training, neuromuscular coordination, vasopneumatic, dry needling, aquatic therapy    Next Treatment: Progress range of motion and also strengthening  Frequency: 2 x Week  Duration: 8 Weeks  Goals: Set and discussed today    4 weeks 1/9/25  Patient to have pain less than 3/10 for improved dorsiflexion range of motion 5 degrees actively  Patient to be independent with HEP and progression for  improved carryover  Improved ability to perform gait mechanics with proper heel-to-toe off flat surfaces 200 feet  Improved tolerance with sit to stand training single upper extremity assist x 10 repetitions from a 90 degree mat height    8 weeks 2/6/25  Patient to have improved LEFS score 10 points for improved function at home  Patient to have pain less than 2/10/10 for improved ADL performance walking for 10 minutes  Improved dorsiflexion 15 degrees for better function with daily activities  Patient to return to work full duty without restriction  Plan of care was developed with input and agreement by the patient      Stephon Mccoy, PT

## 2025-01-07 ENCOUNTER — PATIENT MESSAGE (OUTPATIENT)
Dept: ORTHOPEDIC SURGERY | Facility: HOSPITAL | Age: 35
End: 2025-01-07
Payer: COMMERCIAL

## 2025-01-07 DIAGNOSIS — S82.891A CLOSED RIGHT ANKLE FRACTURE, INITIAL ENCOUNTER: ICD-10-CM

## 2025-01-07 PROCEDURE — RXMED WILLOW AMBULATORY MEDICATION CHARGE

## 2025-01-07 RX ORDER — IBUPROFEN 800 MG/1
800 TABLET ORAL 3 TIMES DAILY
Qty: 90 TABLET | Refills: 3 | Status: SHIPPED | OUTPATIENT
Start: 2025-01-07 | End: 2025-02-08

## 2025-01-07 RX ORDER — PSYLLIUM HUSK 0.4 G
1 CAPSULE ORAL DAILY
Qty: 30 TABLET | Refills: 3 | Status: SHIPPED | OUTPATIENT
Start: 2025-01-07 | End: 2025-02-08

## 2025-01-09 ENCOUNTER — PHARMACY VISIT (OUTPATIENT)
Dept: PHARMACY | Facility: CLINIC | Age: 35
End: 2025-01-09
Payer: MEDICAID

## 2025-01-10 ENCOUNTER — APPOINTMENT (OUTPATIENT)
Dept: PHYSICAL THERAPY | Facility: CLINIC | Age: 35
End: 2025-01-10
Payer: COMMERCIAL

## 2025-01-10 DIAGNOSIS — S82.891D RIGHT MALLEOLAR FRACTURE, CLOSED, WITH ROUTINE HEALING, SUBSEQUENT ENCOUNTER: ICD-10-CM

## 2025-01-14 ENCOUNTER — APPOINTMENT (OUTPATIENT)
Dept: PHYSICAL THERAPY | Facility: CLINIC | Age: 35
End: 2025-01-14
Payer: COMMERCIAL

## 2025-01-14 DIAGNOSIS — S82.891D RIGHT MALLEOLAR FRACTURE, CLOSED, WITH ROUTINE HEALING, SUBSEQUENT ENCOUNTER: ICD-10-CM

## 2025-02-05 ENCOUNTER — PHARMACY VISIT (OUTPATIENT)
Dept: PHARMACY | Facility: CLINIC | Age: 35
End: 2025-02-05
Payer: MEDICAID

## 2025-02-05 PROCEDURE — RXMED WILLOW AMBULATORY MEDICATION CHARGE

## 2025-03-03 PROCEDURE — RXMED WILLOW AMBULATORY MEDICATION CHARGE

## 2025-03-05 ENCOUNTER — PHARMACY VISIT (OUTPATIENT)
Dept: PHARMACY | Facility: CLINIC | Age: 35
End: 2025-03-05
Payer: MEDICAID

## 2025-03-07 ENCOUNTER — APPOINTMENT (OUTPATIENT)
Dept: ORTHOPEDIC SURGERY | Facility: HOSPITAL | Age: 35
End: 2025-03-07
Payer: COMMERCIAL

## 2025-03-07 DIAGNOSIS — S82.851A TRIMALLEOLAR FRACTURE OF ANKLE, CLOSED, RIGHT, INITIAL ENCOUNTER: Primary | ICD-10-CM

## 2025-04-07 PROCEDURE — RXMED WILLOW AMBULATORY MEDICATION CHARGE

## 2025-04-09 ENCOUNTER — PHARMACY VISIT (OUTPATIENT)
Dept: PHARMACY | Facility: CLINIC | Age: 35
End: 2025-04-09
Payer: MEDICAID

## 2025-04-11 ENCOUNTER — OFFICE VISIT (OUTPATIENT)
Dept: ORTHOPEDIC SURGERY | Facility: HOSPITAL | Age: 35
End: 2025-04-11
Payer: COMMERCIAL

## 2025-04-11 ENCOUNTER — HOSPITAL ENCOUNTER (OUTPATIENT)
Dept: RADIOLOGY | Facility: HOSPITAL | Age: 35
Discharge: HOME | End: 2025-04-11
Payer: COMMERCIAL

## 2025-04-11 DIAGNOSIS — S82.891A CLOSED RIGHT ANKLE FRACTURE, INITIAL ENCOUNTER: Primary | ICD-10-CM

## 2025-04-11 DIAGNOSIS — S82.891A CLOSED RIGHT ANKLE FRACTURE, INITIAL ENCOUNTER: ICD-10-CM

## 2025-04-11 PROCEDURE — 99214 OFFICE O/P EST MOD 30 MIN: CPT | Performed by: ORTHOPAEDIC SURGERY

## 2025-04-11 PROCEDURE — 73610 X-RAY EXAM OF ANKLE: CPT | Mod: RT

## 2025-04-11 NOTE — PROGRESS NOTES
History of Present Illness   Rita Mckeon is a 34 y.o. female presenting today for post-op check from ORIF R ankle fx on 8/18/24. Doing PT yet but has been doing exercises at home. Feels she is progressing. Incisions well healed. No concerns.      Past Medical History:   Diagnosis Date    Abnormal Pap smear of cervix     Chronic hypertension 10/12/2023       No meds    Eczema     Hypertension     Obesity in pregnancy ([facility])     Refractive error        Medication Documentation Review Audit       Reviewed by Everett Blackburn MD (Physician) on 04/11/25 at 0837      Medication Order Taking? Sig Documenting Provider Last Dose Status   calcium carbonate-vitamin D3 500 mg-5 mcg (200 unit) tablet [id number]  Take 1 tablet by mouth once daily. Anabella Gibson PA-C  Active   carvedilol (Coreg) 6.25 mg tablet [id number] No Take 1 tablet (6.25 mg) by mouth 2 times a day. Barrington Degroot MD Taking Active   hydrocortisone 2.5 % ointment [id number] No  Historical Provider, MD Taking Active   ibuprofen 800 mg tablet [id number]  Take 1 tablet (800 mg) by mouth 3 times a day. Anabella Gibson PA-C  Active   white petrolatum 42 % ointment ointment [id number] No  Historical Provider, MD Taking Active                    No Known Allergies    Social History     Socioeconomic History    Marital status: Single     Spouse name: Not on file    Number of children: Not on file    Years of education: High School Graduate    Highest education level: Not on file   Occupational History    Occupation: Housekeeping   Tobacco Use    Smoking status: Every Day     Types: Cigars    Smokeless tobacco: Never   Vaping Use    Vaping status: Never Used   Substance and Sexual Activity    Alcohol use: Yes     Alcohol/week: 14.0 standard drinks of alcohol     Types: 14 Standard drinks or equivalent per week    Drug use: Yes     Types: Marijuana    Sexual activity: Not Currently     Partners: Male     Birth control/protection: None   Other Topics Concern     Not on file   Social History Narrative    Not on file     Social Drivers of Health     Financial Resource Strain: Low Risk  (8/19/2024)    Overall Financial Resource Strain (CARDIA)     Difficulty of Paying Living Expenses: Not very hard   Food Insecurity: Not on file   Transportation Needs: No Transportation Needs (8/19/2024)    PRAPARE - Transportation     Lack of Transportation (Medical): No     Lack of Transportation (Non-Medical): No   Physical Activity: Not on file   Stress: Not on file   Social Connections: Not on file   Intimate Partner Violence: Not on file   Housing Stability: Not on file       History reviewed. No pertinent surgical history.       Physical Exam:  Gen: The pt is A&Ox3, NAD, and appear state age and weight  Psychiatric: mood and affect are appropriate   Eyes: sclera are white, EOM grossly intact  ENT: MMM  Neck: supple, thyroid is midline  Respiratory: respirations are nonlabored, chest rise symmetric  CV: rate is regular by palpation of distal pulses  Abdomen: nondistended   Integument: no obvious cutaneous lesions noted. No signs of lymphangitis. No signs of systemic edema.   MSK: right ankle surgical incision well healed without edema, erythema, drainage, or other s/sx of infection. Minimally tender to palpation around the incision. SILT throughout the leg intact. Intact plantarflexion and dorsiflexion. Foot warm and well perfused.      Imaging:  I personally reviewed multiple views of the right ankle were obtained in the office today demonstrate maintenance of reduction, interval healing, and a stable position of the hardware.      Assessment   34 y.o. female post-op from ORIF R ankle fx on 8/18/24.     Plan:  Continue WBAT and activity as tolerated; encouraged to keep up with HEP.     Follow-up 4 months with 3 views right ankle.     All of the patient's questions/concerns address and they are in agreement with the plan.

## 2025-04-26 DIAGNOSIS — S82.891A CLOSED RIGHT ANKLE FRACTURE, INITIAL ENCOUNTER: ICD-10-CM

## 2025-04-28 RX ORDER — PSYLLIUM HUSK 0.4 G
1 CAPSULE ORAL DAILY
Qty: 30 TABLET | Refills: 3 | Status: SHIPPED | OUTPATIENT
Start: 2025-04-28 | End: 2025-05-28

## 2025-04-28 RX ORDER — IBUPROFEN 800 MG/1
800 TABLET ORAL 3 TIMES DAILY
Qty: 90 TABLET | Refills: 3 | Status: SHIPPED | OUTPATIENT
Start: 2025-04-28 | End: 2025-05-28

## 2025-05-26 PROCEDURE — RXMED WILLOW AMBULATORY MEDICATION CHARGE

## 2025-05-29 ENCOUNTER — PHARMACY VISIT (OUTPATIENT)
Dept: PHARMACY | Facility: CLINIC | Age: 35
End: 2025-05-29
Payer: MEDICAID

## 2025-06-24 PROCEDURE — RXMED WILLOW AMBULATORY MEDICATION CHARGE

## 2025-06-25 ENCOUNTER — PHARMACY VISIT (OUTPATIENT)
Dept: PHARMACY | Facility: CLINIC | Age: 35
End: 2025-06-25
Payer: MEDICAID

## 2025-07-24 PROCEDURE — RXMED WILLOW AMBULATORY MEDICATION CHARGE

## 2025-07-25 ENCOUNTER — PHARMACY VISIT (OUTPATIENT)
Dept: PHARMACY | Facility: CLINIC | Age: 35
End: 2025-07-25
Payer: MEDICAID

## 2025-08-08 ENCOUNTER — APPOINTMENT (OUTPATIENT)
Dept: ORTHOPEDIC SURGERY | Facility: HOSPITAL | Age: 35
End: 2025-08-08
Payer: COMMERCIAL

## 2025-08-08 DIAGNOSIS — S82.891A CLOSED RIGHT ANKLE FRACTURE, INITIAL ENCOUNTER: ICD-10-CM

## 2025-08-20 PROCEDURE — RXMED WILLOW AMBULATORY MEDICATION CHARGE

## 2025-08-22 ENCOUNTER — PHARMACY VISIT (OUTPATIENT)
Dept: PHARMACY | Facility: CLINIC | Age: 35
End: 2025-08-22
Payer: MEDICAID

## (undated) DEVICE — APPLICATOR, PREP, CHLORAPREP, W/ORANGE TINT, 10.5ML

## (undated) DEVICE — BANDAGE, ELASTIC, MATRIX, SELF-CLOSURE, 6 IN X 5 YD, LF

## (undated) DEVICE — INSTRUMENT PACK

## (undated) DEVICE — BANDAGE, COFLEX, 4 X 5 YDS, TAN, STERILE, LF

## (undated) DEVICE — ELECTRODE, ELECTROSURGICAL, BLADE, STANDARD, 2.75 IN

## (undated) DEVICE — BANDAGE, GAUZE, CONFORMING, KERLIX, 6 PLY, 4.5 IN X 4.1 YD

## (undated) DEVICE — BANDAGE, ELASTIC, MATRIX, SELF-CLOSURE, 4 IN X 5 YD, LF

## (undated) DEVICE — DRAPE COVER, C ARM, FLOUROSCAN IMAGING SYS

## (undated) DEVICE — COVER, CART, 45 X 27 X 48 IN, CLEAR

## (undated) DEVICE — Device

## (undated) DEVICE — IRRIGATION SET, Y, LARGE BORE

## (undated) DEVICE — SUTURE, VICRYL, 1, 27 IN, CT-1, VIOLET

## (undated) DEVICE — PADDING, WEBRIL, UNDERCAST, STERILE, 4 IN

## (undated) DEVICE — DRILL BIT 2.5MM X 215MM

## (undated) DEVICE — COVER, C-ARM W/CLIPS, OEC GE

## (undated) DEVICE — DRAPE, SHEET, U, W/ADHESIVE STRIP, IMPERVIOUS, 60 X 70 IN, DISPOSABLE, LF, STERILE

## (undated) DEVICE — DRILL BIT 2.5MM X 135MM

## (undated) DEVICE — SUTURE, ETHILON, 3-0, 30 IN, FS-1, BLACK

## (undated) DEVICE — PROTECTOR, NERVE, ULNAR, PINK

## (undated) DEVICE — DRAPE, SHEET, THREE QUARTER, FAN FOLD, 57 X 77 IN

## (undated) DEVICE — APPLICATOR, CHLORAPREP, W/ORANGE TINT, 26ML

## (undated) DEVICE — DRAPE, INCISE, ANTIMICROBIAL, IOBAN 2, LARGE, 17 X 23 IN, DISPOSABLE, STERILE

## (undated) DEVICE — SUTURE, VICRYL, 2-0, 27 IN, FSL, UNDYED

## (undated) DEVICE — COVER, BACK TABLE, 65 X 90, HVY REINFORCED